# Patient Record
Sex: FEMALE | Race: WHITE | NOT HISPANIC OR LATINO
[De-identification: names, ages, dates, MRNs, and addresses within clinical notes are randomized per-mention and may not be internally consistent; named-entity substitution may affect disease eponyms.]

---

## 2018-05-14 ENCOUNTER — TRANSCRIPTION ENCOUNTER (OUTPATIENT)
Age: 43
End: 2018-05-14

## 2018-09-24 ENCOUNTER — EMERGENCY (EMERGENCY)
Facility: HOSPITAL | Age: 43
LOS: 1 days | Discharge: HOME | End: 2018-09-24
Attending: EMERGENCY MEDICINE
Payer: COMMERCIAL

## 2018-09-24 VITALS — HEIGHT: 63 IN | WEIGHT: 190.04 LBS

## 2018-09-24 DIAGNOSIS — D64.9 ANEMIA, UNSPECIFIED: ICD-10-CM

## 2018-09-24 DIAGNOSIS — N93.9 ABNORMAL UTERINE AND VAGINAL BLEEDING, UNSPECIFIED: ICD-10-CM

## 2018-09-24 LAB
ABO RH CONFIRMATION: SIGNIFICANT CHANGE UP
ALBUMIN SERPL ELPH-MCNC: 3.9 G/DL — SIGNIFICANT CHANGE UP (ref 3.3–5.2)
ALP SERPL-CCNC: 63 U/L — SIGNIFICANT CHANGE UP (ref 40–120)
ALT FLD-CCNC: 10 U/L — SIGNIFICANT CHANGE UP
ANION GAP SERPL CALC-SCNC: 9 MMOL/L — SIGNIFICANT CHANGE UP (ref 5–17)
ANISOCYTOSIS BLD QL: SLIGHT — SIGNIFICANT CHANGE UP
APTT BLD: 26.5 SEC — LOW (ref 27.5–37.4)
AST SERPL-CCNC: 20 U/L — SIGNIFICANT CHANGE UP
BASOPHILS # BLD AUTO: 0 K/UL — SIGNIFICANT CHANGE UP (ref 0–0.2)
BASOPHILS NFR BLD AUTO: 0.6 % — SIGNIFICANT CHANGE UP (ref 0–2)
BILIRUB SERPL-MCNC: 0.3 MG/DL — LOW (ref 0.4–2)
BLD GP AB SCN SERPL QL: SIGNIFICANT CHANGE UP
BUN SERPL-MCNC: 11 MG/DL — SIGNIFICANT CHANGE UP (ref 8–20)
CALCIUM SERPL-MCNC: 8.2 MG/DL — LOW (ref 8.6–10.2)
CHLORIDE SERPL-SCNC: 106 MMOL/L — SIGNIFICANT CHANGE UP (ref 98–107)
CO2 SERPL-SCNC: 26 MMOL/L — SIGNIFICANT CHANGE UP (ref 22–29)
CREAT SERPL-MCNC: 0.7 MG/DL — SIGNIFICANT CHANGE UP (ref 0.5–1.3)
EOSINOPHIL # BLD AUTO: 0.4 K/UL — SIGNIFICANT CHANGE UP (ref 0–0.5)
EOSINOPHIL NFR BLD AUTO: 5.2 % — SIGNIFICANT CHANGE UP (ref 0–6)
GLUCOSE SERPL-MCNC: 92 MG/DL — SIGNIFICANT CHANGE UP (ref 70–115)
HCG SERPL-ACNC: <5 MIU/ML — SIGNIFICANT CHANGE UP
HCT VFR BLD CALC: 26.6 % — LOW (ref 37–47)
HGB BLD-MCNC: 7.6 G/DL — LOW (ref 12–16)
HYPOCHROMIA BLD QL: SLIGHT — SIGNIFICANT CHANGE UP
INR BLD: 0.96 RATIO — SIGNIFICANT CHANGE UP (ref 0.88–1.16)
LYMPHOCYTES # BLD AUTO: 1.7 K/UL — SIGNIFICANT CHANGE UP (ref 1–4.8)
LYMPHOCYTES # BLD AUTO: 21.3 % — SIGNIFICANT CHANGE UP (ref 20–55)
MCHC RBC-ENTMCNC: 22.7 PG — LOW (ref 27–31)
MCHC RBC-ENTMCNC: 28.6 G/DL — LOW (ref 32–36)
MCV RBC AUTO: 79.4 FL — LOW (ref 81–99)
MONOCYTES # BLD AUTO: 0.6 K/UL — SIGNIFICANT CHANGE UP (ref 0–0.8)
MONOCYTES NFR BLD AUTO: 7.3 % — SIGNIFICANT CHANGE UP (ref 3–10)
NEUTROPHILS # BLD AUTO: 5.1 K/UL — SIGNIFICANT CHANGE UP (ref 1.8–8)
NEUTROPHILS NFR BLD AUTO: 65.5 % — SIGNIFICANT CHANGE UP (ref 37–73)
PLAT MORPH BLD: NORMAL — SIGNIFICANT CHANGE UP
PLATELET # BLD AUTO: 379 K/UL — SIGNIFICANT CHANGE UP (ref 150–400)
POTASSIUM SERPL-MCNC: 3.6 MMOL/L — SIGNIFICANT CHANGE UP (ref 3.5–5.3)
POTASSIUM SERPL-SCNC: 3.6 MMOL/L — SIGNIFICANT CHANGE UP (ref 3.5–5.3)
PROT SERPL-MCNC: 6.6 G/DL — SIGNIFICANT CHANGE UP (ref 6.6–8.7)
PROTHROM AB SERPL-ACNC: 10.5 SEC — SIGNIFICANT CHANGE UP (ref 9.8–12.7)
RBC # BLD: 3.35 M/UL — LOW (ref 4.4–5.2)
RBC # FLD: 15.5 % — SIGNIFICANT CHANGE UP (ref 11–15.6)
RBC BLD AUTO: ABNORMAL
SODIUM SERPL-SCNC: 141 MMOL/L — SIGNIFICANT CHANGE UP (ref 135–145)
TYPE + AB SCN PNL BLD: SIGNIFICANT CHANGE UP
WBC # BLD: 7.8 K/UL — SIGNIFICANT CHANGE UP (ref 4.8–10.8)
WBC # FLD AUTO: 7.8 K/UL — SIGNIFICANT CHANGE UP (ref 4.8–10.8)

## 2018-09-24 PROCEDURE — 76856 US EXAM PELVIC COMPLETE: CPT | Mod: 26

## 2018-09-24 PROCEDURE — 76856 US EXAM PELVIC COMPLETE: CPT

## 2018-09-24 PROCEDURE — 36430 TRANSFUSION BLD/BLD COMPNT: CPT

## 2018-09-24 PROCEDURE — 86900 BLOOD TYPING SEROLOGIC ABO: CPT

## 2018-09-24 PROCEDURE — 86850 RBC ANTIBODY SCREEN: CPT

## 2018-09-24 PROCEDURE — 85610 PROTHROMBIN TIME: CPT

## 2018-09-24 PROCEDURE — 76830 TRANSVAGINAL US NON-OB: CPT | Mod: 26

## 2018-09-24 PROCEDURE — 84702 CHORIONIC GONADOTROPIN TEST: CPT

## 2018-09-24 PROCEDURE — 76830 TRANSVAGINAL US NON-OB: CPT

## 2018-09-24 PROCEDURE — 36415 COLL VENOUS BLD VENIPUNCTURE: CPT

## 2018-09-24 PROCEDURE — 80053 COMPREHEN METABOLIC PANEL: CPT

## 2018-09-24 PROCEDURE — 85027 COMPLETE CBC AUTOMATED: CPT

## 2018-09-24 PROCEDURE — 86901 BLOOD TYPING SEROLOGIC RH(D): CPT

## 2018-09-24 PROCEDURE — 99284 EMERGENCY DEPT VISIT MOD MDM: CPT

## 2018-09-24 PROCEDURE — 85730 THROMBOPLASTIN TIME PARTIAL: CPT

## 2018-09-24 PROCEDURE — 86923 COMPATIBILITY TEST ELECTRIC: CPT

## 2018-09-24 PROCEDURE — P9016: CPT

## 2018-09-24 PROCEDURE — 99283 EMERGENCY DEPT VISIT LOW MDM: CPT | Mod: GC

## 2018-09-24 PROCEDURE — 99285 EMERGENCY DEPT VISIT HI MDM: CPT | Mod: 25

## 2018-09-24 RX ORDER — NORETHINDRONE AND ETHINYL ESTRADIOL 0.4-0.035
1 KIT ORAL
Qty: 1 | Refills: 2 | OUTPATIENT
Start: 2018-09-24 | End: 2018-12-16

## 2018-09-24 RX ORDER — FERROUS SULFATE 325(65) MG
1 TABLET ORAL
Qty: 30 | Refills: 1 | OUTPATIENT
Start: 2018-09-24 | End: 2018-11-22

## 2018-09-24 NOTE — ED ADULT NURSE NOTE - NSIMPLEMENTINTERV_GEN_ALL_ED
Implemented All Universal Safety Interventions:  [place] to call system. Call bell, personal items and telephone within reach. Instruct patient to call for assistance. Room bathroom lighting operational. Non-slip footwear when patient is off stretcher. Physically safe environment: no spills, clutter or unnecessary equipment. Stretcher in lowest position, wheels locked, appropriate side rails in place.

## 2018-09-24 NOTE — ED ADULT NURSE NOTE - OBJECTIVE STATEMENT
Assumed care at 1500.  a+OX4, reports abdominal cramping and vag bleeding, hx of cysts.  Denies any other complaints at this time

## 2018-09-24 NOTE — CONSULT NOTE ADULT - SUBJECTIVE AND OBJECTIVE BOX
42 yo P2   with bleeding for 3.5 weeks. She reports that during this time her bleeding has gotten heavier and lighter and she has had episode of passing clots with using 1 tampon/hour at times over the last few weeks. She states that over the last week she has had a few episodes of dizziness and feeling SOB and tired when climbing stairs. Feeling "like she has bricks on her feet. " Before this episode she reports having regular periods that come very 28 days and last 3-4 days. She does note that her periods have gotten heavier over the last few years.     ROS:  Gen: no fatigue  Resp: no SOB when walking up stairs   : no dysuria, increased frequency, urge  Gyn: + vaginal bleeding  ID: no fevers, chills  Int: no rash    PMH: Thyroid cancer twice - now hypothyroids, had elevated TSH in last few months   PAST MEDICAL & SURGICAL HISTORY:      ObHx:  x2  GynHx:   regular 28 day periods lasting 3-4 days    Menarchy:           Period:   last Pap: over 1 year ago, no h/o abnormal Paps   + history of cysts; denies h/o fibroids, STIs   Currently sexually active, not on any birth control       Allergies    No Known Allergies    Intolerances      Meds:  Home Medications:    Health Maintenance:       Height (cm): 160.02 (18 @ 14:25)  Weight (kg): 86.2 (18 @ 14:25)  BMI (kg/m2): 33.7 (18 @ 14:25)  BSA (m2): 1.89 (18 @ 14:25)  T(C): 36.7 (18 @ 14:34), Max: 36.7 (18 @ 14:34)  HR: 90 (18 @ :34) (90 - 90)  BP: 113/72 (18 @ 14:34) (113/72 - 113/72)  RR: 16 (18 @ 14:34) (16 - 16)  SpO2: 98% (18 @ 14:34) (98% - 98%)    Physical Exam:  Gen: alert oriented no acute distress, no apparent SOB with talking and walking  Abd: soft non-tender, no surgical incisions  Gyn:    external genitalia normal in appearance no lesions, blood on external genitalia     on speculum exam no abnormal lesions visualized, no abnormal discharge, small amount of dark red blood in the vaginal vault     on bimanual exam small anteverted uterus, right adnexal mass felt on bimanual.  no CMT.  os closed.            141  |  106  |  11.0  ----------------------------<  92  3.6   |  26.0  |  0.70    Ca    8.2<L>      24 Sep 2018 14:57    TPro  6.6  /  Alb  3.9  /  TBili  0.3<L>  /  DBili  x   /  AST  20  /  ALT  10  /  AlkPhos  63      LIVER FUNCTIONS - ( 24 Sep 2018 14:57 )  Alb: 3.9 g/dL / Pro: 6.6 g/dL / ALK PHOS: 63 U/L / ALT: 10 U/L / AST: 20 U/L / GGT: x             HCG Quantitative, Serum: <5 mIU/mL (18 @ 14:57)    < from: US Pelvis Complete (18 @ 17:32) >  EXAM:  US PELVIC COMPLETE                         EXAM:  US TRANSVAGINAL                          PROCEDURE DATE:  2018          INTERPRETATION:  Ultrasound of the female pelvis         CLINICAL INFORMATION:   Dysfunctional vaginal  Pain.    TECHNIQUE:  Transabdominal and endovaginal sonographic imaging was   performed to better visualize the adnexa.    FINDINGS:  No prior similar studies are available for review.    The uterus is anteverted.  It measures 10.0 x 5.0 x 5.2 cm. ( length x AP   x transverse).  Its contours are smooth.  The myometrium demonstrates no   focal lesion.  The endometrium demonstrates a thickness of 1.9 cm.    No endometrial fluid or mass seen.  Multiple subcentimeter nabothian cysts seen within the endocervical   myometrium measuring up to  0.33 cm.     The right ovary measures 7.1 x 4.9 x 6.6 cm.  RIGHT ovarian cyst   measures 6.0 x 3.8 x 5.1.      The left ovary measures 1.0 x 2.3 x 2.9 cm.           No adnexal suspicious mass, calcification or fat is found.         Color and pulsed doppler interrogation of both ovaries demonstrate normal   arterial and venous vascular waveforms.     No free fluid is found in the pelvis.    The bladder appears unremarkable.      IMPRESSION:    RIGHT ovarian cyst measures 6 cm in greatest diameter.   Follow-up sonogram within one to two menstrual cycles recommended to   confirm resolution.  Endometrium measures 1.9 cm without endometrial fluid or mass.          < end of copied text >

## 2018-09-24 NOTE — CONSULT NOTE ADULT - ASSESSMENT
42 yo P2 here for vaginal bleeding for three weeks with symptomatic anemia. First episode of abnormal uterine bleeding per pt's history. She does have some symptomatology of anemia, without any prior history of anemia. No current heavy bleeding on physical exam. She did not appear symptomatic from walking throughout the ED during the visit. With prior history of symptomatology and Hct of 26, will plan on transfusing 1 unit prbc and discharging outpatient with OCP and iron.

## 2018-09-24 NOTE — CONSULT NOTE ADULT - ATTENDING COMMENTS
patient seen and examined with PGY2 - patient stable, no active bleeding   symptomatic anemia - blood transfusion offered, pt to decide  reviewed importance of establishing GYN care for laboratory and possible pathologic evaluation

## 2018-09-24 NOTE — ED PROVIDER NOTE - OBJECTIVE STATEMENT
44 y/o F with PMHx of ovarian cysts presents to ED c/o heavy vaginal bleeding onset 3.5 weeks ago since her last menstrual period, worsening over the past week. States she recently relocated back to [place] and has not re-established PCP or OBGYN care. She is currently going through 1-2 super sized tampons per hour and is passing large clots. Also has some pressure in her abdomen and states "it is mostly over my L ovary". Denies fevers, chills, abdominal pain, nausea, vomiting, dizziness, lightheadedness, or syncope. Has never had fibroids before. No further acute complaints at this time.

## 2018-09-24 NOTE — ED PROVIDER NOTE - MEDICAL DECISION MAKING DETAILS
Pt with symptomatic anemia secondary to DUB. OBGYN has seen, f/u established and given OCPs. Pt well appearing and stable for dc

## 2018-09-25 VITALS
HEART RATE: 78 BPM | SYSTOLIC BLOOD PRESSURE: 108 MMHG | DIASTOLIC BLOOD PRESSURE: 69 MMHG | OXYGEN SATURATION: 100 % | TEMPERATURE: 98 F | RESPIRATION RATE: 16 BRPM

## 2018-09-25 NOTE — ED ADULT NURSE REASSESSMENT NOTE - NS ED NURSE REASSESS COMMENT FT1
received pt at this time, pt a&ox3 denies any pain/discomfort. pt still reports heavy vaginal bleeding. consult obtained for blood transfusion. blood bank called, need confirmiatory. will obtain and send to lab. pt updated on plan of care, call bell in reach
s/p 1u prbc, pt denies any s/s adverse rx. pt states she feels much better. vaginal bleeding and cramping persist
Pt tolerating PRBC infusion well, offers no complaints, denies pain, no s/s of transfusion reaction, awaiting completion of blood and D/C, will continue to monitor

## 2019-07-15 ENCOUNTER — EMERGENCY (EMERGENCY)
Facility: HOSPITAL | Age: 44
LOS: 1 days | Discharge: HOME | End: 2019-07-15
Attending: EMERGENCY MEDICINE | Admitting: EMERGENCY MEDICINE
Payer: MEDICAID

## 2019-07-15 VITALS
OXYGEN SATURATION: 100 % | RESPIRATION RATE: 18 BRPM | SYSTOLIC BLOOD PRESSURE: 112 MMHG | TEMPERATURE: 98 F | HEART RATE: 81 BPM | DIASTOLIC BLOOD PRESSURE: 60 MMHG

## 2019-07-15 DIAGNOSIS — Z98.890 OTHER SPECIFIED POSTPROCEDURAL STATES: Chronic | ICD-10-CM

## 2019-07-15 DIAGNOSIS — Z90.3 ACQUIRED ABSENCE OF STOMACH [PART OF]: Chronic | ICD-10-CM

## 2019-07-15 LAB
ALBUMIN SERPL ELPH-MCNC: 4 G/DL — SIGNIFICANT CHANGE UP (ref 3.3–5)
ALP SERPL-CCNC: 56 U/L — SIGNIFICANT CHANGE UP (ref 40–120)
ALT FLD-CCNC: 6 U/L — SIGNIFICANT CHANGE UP (ref 4–33)
ANION GAP SERPL CALC-SCNC: 11 MMO/L — SIGNIFICANT CHANGE UP (ref 7–14)
APTT BLD: 26.4 SEC — LOW (ref 27.5–36.3)
AST SERPL-CCNC: 14 U/L — SIGNIFICANT CHANGE UP (ref 4–32)
BASOPHILS # BLD AUTO: 0.07 K/UL — SIGNIFICANT CHANGE UP (ref 0–0.2)
BASOPHILS NFR BLD AUTO: 1.2 % — SIGNIFICANT CHANGE UP (ref 0–2)
BILIRUB SERPL-MCNC: 0.4 MG/DL — SIGNIFICANT CHANGE UP (ref 0.2–1.2)
BLD GP AB SCN SERPL QL: NEGATIVE — SIGNIFICANT CHANGE UP
BUN SERPL-MCNC: 9 MG/DL — SIGNIFICANT CHANGE UP (ref 7–23)
CALCIUM SERPL-MCNC: 8.5 MG/DL — SIGNIFICANT CHANGE UP (ref 8.4–10.5)
CHLORIDE SERPL-SCNC: 105 MMOL/L — SIGNIFICANT CHANGE UP (ref 98–107)
CK SERPL-CCNC: 70 U/L — SIGNIFICANT CHANGE UP (ref 25–170)
CO2 SERPL-SCNC: 24 MMOL/L — SIGNIFICANT CHANGE UP (ref 22–31)
CREAT SERPL-MCNC: 0.72 MG/DL — SIGNIFICANT CHANGE UP (ref 0.5–1.3)
EOSINOPHIL # BLD AUTO: 0.28 K/UL — SIGNIFICANT CHANGE UP (ref 0–0.5)
EOSINOPHIL NFR BLD AUTO: 4.9 % — SIGNIFICANT CHANGE UP (ref 0–6)
FERRITIN SERPL-MCNC: 3.57 NG/ML — LOW (ref 15–150)
FOLATE SERPL-MCNC: 13.1 NG/ML — SIGNIFICANT CHANGE UP (ref 4.7–20)
GLUCOSE SERPL-MCNC: 95 MG/DL — SIGNIFICANT CHANGE UP (ref 70–99)
HAPTOGLOB SERPL-MCNC: 95 MG/DL — SIGNIFICANT CHANGE UP (ref 34–200)
HCT VFR BLD CALC: 26.2 % — LOW (ref 34.5–45)
HGB BLD-MCNC: 6.4 G/DL — CRITICAL LOW (ref 11.5–15.5)
IMM GRANULOCYTES NFR BLD AUTO: 0.2 % — SIGNIFICANT CHANGE UP (ref 0–1.5)
INR BLD: 0.93 — SIGNIFICANT CHANGE UP (ref 0.88–1.17)
IRON SATN MFR SERPL: 12 UG/DL — LOW (ref 30–160)
IRON SATN MFR SERPL: 436 UG/DL — SIGNIFICANT CHANGE UP (ref 140–530)
LDH SERPL L TO P-CCNC: 173 U/L — SIGNIFICANT CHANGE UP (ref 135–225)
LIDOCAIN IGE QN: 69 U/L — HIGH (ref 7–60)
LYMPHOCYTES # BLD AUTO: 1.46 K/UL — SIGNIFICANT CHANGE UP (ref 1–3.3)
LYMPHOCYTES # BLD AUTO: 25.4 % — SIGNIFICANT CHANGE UP (ref 13–44)
MCHC RBC-ENTMCNC: 16.6 PG — LOW (ref 27–34)
MCHC RBC-ENTMCNC: 24.4 % — LOW (ref 32–36)
MCV RBC AUTO: 67.9 FL — LOW (ref 80–100)
MONOCYTES # BLD AUTO: 0.46 K/UL — SIGNIFICANT CHANGE UP (ref 0–0.9)
MONOCYTES NFR BLD AUTO: 8 % — SIGNIFICANT CHANGE UP (ref 2–14)
NEUTROPHILS # BLD AUTO: 3.47 K/UL — SIGNIFICANT CHANGE UP (ref 1.8–7.4)
NEUTROPHILS NFR BLD AUTO: 60.3 % — SIGNIFICANT CHANGE UP (ref 43–77)
NRBC # FLD: 0 K/UL — SIGNIFICANT CHANGE UP (ref 0–0)
OB PNL STL: NEGATIVE — SIGNIFICANT CHANGE UP
PLATELET # BLD AUTO: 372 K/UL — SIGNIFICANT CHANGE UP (ref 150–400)
PMV BLD: 8.9 FL — SIGNIFICANT CHANGE UP (ref 7–13)
POTASSIUM SERPL-MCNC: 3.7 MMOL/L — SIGNIFICANT CHANGE UP (ref 3.5–5.3)
POTASSIUM SERPL-SCNC: 3.7 MMOL/L — SIGNIFICANT CHANGE UP (ref 3.5–5.3)
PROT SERPL-MCNC: 6.7 G/DL — SIGNIFICANT CHANGE UP (ref 6–8.3)
PROTHROM AB SERPL-ACNC: 10.6 SEC — SIGNIFICANT CHANGE UP (ref 9.8–13.1)
RBC # BLD: 3.86 M/UL — SIGNIFICANT CHANGE UP (ref 3.8–5.2)
RBC # FLD: 18.5 % — HIGH (ref 10.3–14.5)
RH IG SCN BLD-IMP: POSITIVE — SIGNIFICANT CHANGE UP
SODIUM SERPL-SCNC: 140 MMOL/L — SIGNIFICANT CHANGE UP (ref 135–145)
TSH SERPL-MCNC: 9.17 UIU/ML — HIGH (ref 0.27–4.2)
UIBC SERPL-MCNC: 423.6 UG/DL — HIGH (ref 110–370)
VIT B12 SERPL-MCNC: 272 PG/ML — SIGNIFICANT CHANGE UP (ref 200–900)
WBC # BLD: 5.75 K/UL — SIGNIFICANT CHANGE UP (ref 3.8–10.5)
WBC # FLD AUTO: 5.75 K/UL — SIGNIFICANT CHANGE UP (ref 3.8–10.5)

## 2019-07-15 PROCEDURE — 99218: CPT

## 2019-07-15 PROCEDURE — 93971 EXTREMITY STUDY: CPT | Mod: 26,LT

## 2019-07-15 RX ORDER — LEVOTHYROXINE SODIUM 125 MCG
50 TABLET ORAL DAILY
Refills: 0 | Status: DISCONTINUED | OUTPATIENT
Start: 2019-07-15 | End: 2019-07-19

## 2019-07-15 RX ORDER — ACETAMINOPHEN 500 MG
650 TABLET ORAL ONCE
Refills: 0 | Status: COMPLETED | OUTPATIENT
Start: 2019-07-15 | End: 2019-07-15

## 2019-07-15 RX ORDER — FAMOTIDINE 10 MG/ML
20 INJECTION INTRAVENOUS ONCE
Refills: 0 | Status: COMPLETED | OUTPATIENT
Start: 2019-07-15 | End: 2019-07-15

## 2019-07-15 RX ADMIN — Medication 650 MILLIGRAM(S): at 18:38

## 2019-07-15 RX ADMIN — FAMOTIDINE 20 MILLIGRAM(S): 10 INJECTION INTRAVENOUS at 18:39

## 2019-07-15 NOTE — ED PROVIDER NOTE - PHYSICAL EXAMINATION
GEN - NAD; well appearing; A+O x3   HEAD - NC/AT   EYES- PERRL, EOMI  ENT: Airway patent, mmm, Oral cavity and pharynx normal.   NECK: Neck supple  PULMONARY - CTA b/l, symmetric breath sounds.   CARDIAC -s1s2, RRR, no M,G,R  ABDOMEN - +BS, ND, NT, soft, no guarding, no rebound, no masses   BACK - no CVA tenderness, Normal  spine   EXTREMITIES - LLE with no tenderness, edema, warmth, redness-well perfused, nvi. remainder of ext exam nl-from, no edema.  SKIN - no rash or bruising   NEUROLOGIC - alert, speech clear, no focal deficits  PSYCH -nl mood/affect, nl insight.

## 2019-07-15 NOTE — ED PROVIDER NOTE - NS ED ROS FT
ROS:  GENERAL: No fever, no chills, +fatigue  EYES: no change in vision  HEENT: no trouble swallowing, no trouble speaking  CARDIAC: no chest pain  PULMONARY: no cough, no shortness of breath  GI: +intermittent epigastric pain  : No dysuria, no frequency, no change in appearance, or odor of urine  SKIN: no rashes  NEURO: no headache, no weakness  MSK: No joint pain  HEME: + anemia

## 2019-07-15 NOTE — ED CDU PROVIDER INITIAL DAY NOTE - OBJECTIVE STATEMENT
ED provider note initial HPI: "43 y/o f presents with fatigue/generalized weakness, worsening over the last few weeks. Went to doctor few days ago, had blood drawn, and called saturday and told to come to ed for transfusion for hb of 5. Patient has h/o anemia requiring transfusion, last was in january, 2/2 heavy periods. Recently has not been having periods. Also notes crampy left leg pain for months, now becoming more frequent, constant, no alleviating or exacerbating factors. Patient also notes intermittent epigastric pain after eating for months, nonradiating, none at this time. No fevers, chills, ha, cp, sob, vomiting, diarrhea, dysuria, hematuria, brbpr, melena. Patient was previously off her levothyroxine-just had 50mcg re-prescribed for her."    CDU note: 44 year old female with hx of gastrectomy and thyroidectomy presents to the ED complaining of feeling increasingly tired. Pt states she went to her PMD last Thursday, had routine blood work, and was told to have low hemoglobin and low iron. Pt admits to a hx of anemia which required transfusion in the past; anemia thought to be from heavy vaginal bleeding; denies active bleeding at this time. Of note pt also complains of epigastric discomfort for a bout a year  but asymptomatic at present, and left lower extremity pain for many months which she describes as crampy feeling without . Pt denies chest pain, dyspnea, dizziness, nausea, vomiting, melena, hematochezia, fever or chills. Pt denies any other complaints. ED provider note initial HPI: "43 y/o f presents with fatigue/generalized weakness, worsening over the last few weeks. Went to doctor few days ago, had blood drawn, and called saturday and told to come to ed for transfusion for hb of 5. Patient has h/o anemia requiring transfusion, last was in january, 2/2 heavy periods. Recently has not been having periods. Also notes crampy left leg pain for months, now becoming more frequent, constant, no alleviating or exacerbating factors. Patient also notes intermittent epigastric pain after eating for months, nonradiating, none at this time. No fevers, chills, ha, cp, sob, vomiting, diarrhea, dysuria, hematuria, brbpr, melena. Patient was previously off her levothyroxine-just had 50mcg re-prescribed for her."    CDU note: 44 year old female with hx of gastrectomy and thyroidectomy presents to the ED complaining of feeling increasingly tired. Pt states she went to her PMD last Thursday, had routine blood work, and was told to have low hemoglobin and low iron. Pt admits to a hx of anemia which required transfusion in the past; anemia thought to be from heavy vaginal bleeding; denies active bleeding at this time. Of note pt also complains of epigastric discomfort for a bout a year  but asymptomatic at present, and left lower extremity pain for many months which she describes as crampy feeling without . Pt denies chest pain, dyspnea, dizziness, nausea, vomiting, melena, hematochezia, hematuria, vaginal bleeding, fever or chills. Pt denies any other complaints.

## 2019-07-15 NOTE — ED CDU PROVIDER INITIAL DAY NOTE - ATTENDING CONTRIBUTION TO CARE
43 y/o f presents for low h/h found on bloodwork last week, h/o anemia requiring transfusion, previously from heavy vaginal bleeding. Also with intermittent epigastric pain for months, h/o sleeve gastrectomy, and lle cramping. Patient is very well appearing and exam is grossly benign-abd nontender, lle normal. Ed w/u was significant for severe anemia, neg fobt, neg dvt study, neg ck. Leg pain likely msk, plan for cdu-transfusion of prbc and reass labwork. patient also with known hypothyroidism just restarted on meds.

## 2019-07-15 NOTE — ED PROVIDER NOTE - PROGRESS NOTE DETAILS
guiac negative, all results d/w patient-transfusions ordered, to go to cdu for prbc-consented, agreeable to plan.

## 2019-07-15 NOTE — ED PROVIDER NOTE - CLINICAL SUMMARY MEDICAL DECISION MAKING FREE TEXT BOX
45 y/o f presents for low h/h found on bloodwork last week, h/o anemia requiring transfusion, previously from heavy vaginal bleeding. Also with intermittent epigastric pain for months, h/o sleeve gastrectomy, and lle cramping. Patient is very well appearing and exam is grossly benign-abd nontender, lle normal. Diff dx includes gi bleed 2/2 gastritis v. ulcer, v. anemia from previous vag bleeding (though patient states she hasn't bled heavily for months), v. pancreatitis. LLE pain likely msk but will do us to eval for dvt, occult blood to eval for gi bl.

## 2019-07-15 NOTE — ED PROVIDER NOTE - OBJECTIVE STATEMENT
45 y/o f presents with fatigue/generalized weakness, worsening over the last few weeks. Went to doctor few days ago, had blood drawn, and called saturday and told to come to ed for transfusion for hb of 5. Patient has h/o anemia requiring transfusion, last was in january, 2/2 heavy periods. Recently has not been having periods. Also notes crampy left leg pain for months, now becoming more frequent, constant, no alleviating or exacerbating factors. Patient also notes intermittent epigastric pain after eating for months, nonradiating, none at this time. No fevers, chills, ha, cp, sob, vomiting, diarrhea, dysuria, hematuria, brbpr, melena. 43 y/o f presents with fatigue/generalized weakness, worsening over the last few weeks. Went to doctor few days ago, had blood drawn, and called saturday and told to come to ed for transfusion for hb of 5. Patient has h/o anemia requiring transfusion, last was in january, 2/2 heavy periods. Recently has not been having periods. Also notes crampy left leg pain for months, now becoming more frequent, constant, no alleviating or exacerbating factors. Patient also notes intermittent epigastric pain after eating for months, nonradiating, none at this time. No fevers, chills, ha, cp, sob, vomiting, diarrhea, dysuria, hematuria, brbpr, melena. Patient was previously off her levothyroxine-just had 50mcg re-prescribed for her.

## 2019-07-15 NOTE — ED ADULT NURSE NOTE - OBJECTIVE STATEMENT
p/t is a 44y old female complaining of abd pain and low h & h, sent by PMD for possible blood transfusion, labs drawn and sent, will continue to monitor

## 2019-07-15 NOTE — ED ADULT NURSE NOTE - CHIEF COMPLAINT QUOTE
pt sent to ED for abnormal blood work.  pt went to her PMD for sick visit, dizziness, weakness and was found to have low H&H

## 2019-07-15 NOTE — ED CDU PROVIDER INITIAL DAY NOTE - DETAILS
44 year old female presenting with feelings of tiredness; found to have severe anemia by PMD; has hx of anemia requiring transfusion in past; no active source of bleeding at this time. Also complains of chronic left leg pain and epigastric discomfort. Hemodynamically stable, sleeping comfortably, A&Ox3, heart and lung exam normal, abdomen soft nontender, left lower extremity without swelling, erythema or tenderness, motor strength 5/5, sensation intact to light touch, DP/PT pulses 2+.   In ED, labs revealed H/H 6.4/26.2, Guaiac negative, left LE duplex scan negative for DVT, CK normal.  Pt is in CDU for severe anemia requiring transfusion. Plan for 2PRBCs, monitor, serial CBCs.

## 2019-07-16 VITALS
SYSTOLIC BLOOD PRESSURE: 115 MMHG | HEART RATE: 75 BPM | DIASTOLIC BLOOD PRESSURE: 68 MMHG | OXYGEN SATURATION: 99 % | RESPIRATION RATE: 18 BRPM | TEMPERATURE: 99 F

## 2019-07-16 LAB
BASOPHILS # BLD AUTO: 0.07 K/UL — SIGNIFICANT CHANGE UP (ref 0–0.2)
BASOPHILS NFR BLD AUTO: 1.2 % — SIGNIFICANT CHANGE UP (ref 0–2)
EOSINOPHIL # BLD AUTO: 0.25 K/UL — SIGNIFICANT CHANGE UP (ref 0–0.5)
EOSINOPHIL NFR BLD AUTO: 4.4 % — SIGNIFICANT CHANGE UP (ref 0–6)
HCT VFR BLD CALC: 30.2 % — LOW (ref 34.5–45)
HGB BLD-MCNC: 8 G/DL — LOW (ref 11.5–15.5)
IMM GRANULOCYTES NFR BLD AUTO: 0.4 % — SIGNIFICANT CHANGE UP (ref 0–1.5)
LYMPHOCYTES # BLD AUTO: 1.12 K/UL — SIGNIFICANT CHANGE UP (ref 1–3.3)
LYMPHOCYTES # BLD AUTO: 19.9 % — SIGNIFICANT CHANGE UP (ref 13–44)
MCHC RBC-ENTMCNC: 18.6 PG — LOW (ref 27–34)
MCHC RBC-ENTMCNC: 26.5 % — LOW (ref 32–36)
MCV RBC AUTO: 70.2 FL — LOW (ref 80–100)
MONOCYTES # BLD AUTO: 0.46 K/UL — SIGNIFICANT CHANGE UP (ref 0–0.9)
MONOCYTES NFR BLD AUTO: 8.2 % — SIGNIFICANT CHANGE UP (ref 2–14)
NEUTROPHILS # BLD AUTO: 3.7 K/UL — SIGNIFICANT CHANGE UP (ref 1.8–7.4)
NEUTROPHILS NFR BLD AUTO: 65.9 % — SIGNIFICANT CHANGE UP (ref 43–77)
NRBC # FLD: 0 K/UL — SIGNIFICANT CHANGE UP (ref 0–0)
PLATELET # BLD AUTO: 253 K/UL — SIGNIFICANT CHANGE UP (ref 150–400)
PMV BLD: 8.3 FL — SIGNIFICANT CHANGE UP (ref 7–13)
RBC # BLD: 4.3 M/UL — SIGNIFICANT CHANGE UP (ref 3.8–5.2)
RBC # FLD: 19.7 % — HIGH (ref 10.3–14.5)
T3 SERPL-MCNC: 79.8 NG/DL — LOW (ref 80–200)
T3FREE SERPL-MCNC: 1.94 PG/ML — SIGNIFICANT CHANGE UP (ref 1.8–4.6)
T4 AB SER-ACNC: 4.65 UG/DL — LOW (ref 5.1–13)
WBC # BLD: 5.62 K/UL — SIGNIFICANT CHANGE UP (ref 3.8–10.5)
WBC # FLD AUTO: 5.62 K/UL — SIGNIFICANT CHANGE UP (ref 3.8–10.5)

## 2019-07-16 PROCEDURE — 99217: CPT

## 2019-07-16 RX ADMIN — Medication 50 MICROGRAM(S): at 05:48

## 2019-07-16 NOTE — ED CDU PROVIDER SUBSEQUENT DAY NOTE - PROGRESS NOTE DETAILS
Pt signed out that she was pending repeat labs this morning after transfusion. Hemoglobin improved to 8. No active bleeding or current symptoms. Has been ambulatory around the unit without issue. Pt states when she saw her doctor and found she was anemic, also told that her thyroid levels were abnormal and was restarted on her synthroid. Pt states has hx of papillary thyroid cancer s/p thyroidectomy, was off synthroid for some time but now back on. All copies of reports given to patient and to ensure follow up with PMD. Pt also advised to begin Iron supplementation. Return precautions given to patient. Ready for dc.

## 2019-07-16 NOTE — ED CDU PROVIDER SUBSEQUENT DAY NOTE - HISTORY
44 year old female presenting with feelings of tiredness; found to have severe anemia by PMD; has hx of anemia requiring transfusion in past; no active source of bleeding at this time. In ED, labs revealed H/H 6.4/26.2, Guaiac negative, left LE duplex scan negative for DVT, CK normal. Pt is in CDU for severe anemia requiring transfusion. No acute events overnight. Plan for 2PRBCs (2nd unit is now running), monitor, serial CBCs.

## 2019-07-16 NOTE — ED CDU PROVIDER DISPOSITION NOTE - CLINICAL COURSE
44F with pmh of anemia (requiring transfusions, 2/2 vaginal bleeding), hypothyroidism p/w generalized fatigue and low Hgb in PMDs office. Pt denies current vaginal bleeding or melena. Not on blood thinners. Vitals stable. Hg 6.4 in the ED.  occult blood negative. Pt also had LLE pain and had a negative LLE Doppler. Pt was placed in CDU for transfusion and repeat CBC. TSH elevated, thyroid panel sent. Pt reports that was restarted on her synthroid last week and will follow up with her endocrinologist. Repeat CBC with Hgb 8.0. Pt feels well and will follow up with her PMD. Pt recommended starting iron supplement.

## 2019-07-16 NOTE — ED CDU PROVIDER DISPOSITION NOTE - NSFOLLOWUPINSTRUCTIONS_ED_ALL_ED_FT
Begin taking over the counter Iron supplementation ( SlowFE) as your Iron level was found to be low.   Follow up with your primary care physician in 48-72 hours- bring copies of your results. Make sure you show Thyroid function studies to ensure proper management of medication regimen.   Return to the Emergency Department for fevers, shortness of breath , chest pain, palpitations, worsening or persistent symptoms OR ANY NEW OR CONCERNING SYMPTOMS.

## 2019-07-16 NOTE — ED CDU PROVIDER DISPOSITION NOTE - ATTENDING CONTRIBUTION TO CARE
Dr. Grimaldo: I performed a face to face bedside interview with patient regarding history of present illness, review of symptoms and past medical history. I completed an independent physical exam.  I have discussed patient's plan of care with PA.   I agree with note as stated above, having amended the EMR as needed to reflect my findings.   This includes HISTORY OF PRESENT ILLNESS, HIV, PAST MEDICAL/SURGICAL/FAMILY/SOCIAL HISTORY, ALLERGIES AND HOME MEDICATIONS, REVIEW OF SYSTEMS, PHYSICAL EXAM, and any PROGRESS NOTES during the time I functioned as the attending physician for this patient.

## 2019-07-18 NOTE — ED POST DISCHARGE NOTE - RESULT SUMMARY
43 y/o female seen in ED for transfusion.  Pt is s/p thyroidectomy and started taking Synthroid again this week.  Follow up pending Free Triiodothyronine result.  TSH 9.17, T3 Total 79.8 and T4 4.65.  Pt instructed to follow up with PMD for thyroid results.  Contact pt with pending lab result - Free Triiodothyronine result 1.94

## 2020-11-17 ENCOUNTER — TRANSCRIPTION ENCOUNTER (OUTPATIENT)
Age: 45
End: 2020-11-17

## 2021-03-01 NOTE — ED ADULT NURSE NOTE - TOBACCO SCREENING (FROM THE ASSIST)
[facility] ENCOUNTER  PODIATRY FOLLOW UP OFFICE VISIT    CHIEF COMPLAINT:   Chief Complaint   Patient presents with   • Foot     2 week f/u, Recheck right foot. [place] right plantars wart. Patient is still having pain in the heel area and doesn't radiate elsewhere. Significantly the worst pain in the morning, overall has gotten better though.       SUBJECTIVE:  HISTORY OF PRESENT ILLNESS:  Kassi Meyers is a 41 year old female seen today for recheck right foot pain and right wart.  Patient states she is doing better.  Would like to get back to exercise.    REVIEW OF SYSTEMS:  Constitutional: Negative for fever and chills.  Skin: Negative for rash.  Neurologic: Feet were negative for changes in sensory or motor function.  Endocrine: Negative for heat or cold intolerance.  Hematologic: Patient on anticoagulants. []  YES    [x]  NO  Extremities:  Edema: none.  Tenderness: right foot only.  All other systems are reviewed and negative except as noted in history of present illness.    HISTORIES:  I have reviewed the past medical history, family history, social history, medications and allergies listed in the medical record as well as the nursing notes for this encounter.    OBJECTIVE  PHYSICAL EXAM:  Visit Vitals  /68   Pulse 70   Ht 5' 10\" (1.778 m) Comment: stated   Wt 107.2 kg Comment: stated   LMP 08/07/2020 (Exact Date)   BMI 33.91 kg/m²     General:  No apparent distress. Alert and oriented.  Neurological:  Protective sensation: Intact to light touch bilaterally.  Normal tone bilateral lower extremities.  Vascular:  Pulses: Right  Dorsalis Pedis 2/4 and Posterior Tibial 2/4.  Left  Dorsalis Pedis 2/4 and Posterior Tibial 2/4  Capillary refill < three seconds bilaterally in digits 1-5  No edema of bilateral lower extremities/feet.  Hair growth present at the level of the digits     Dermatologic:  Skin: Clean, dry and intact bilaterally. Skin texture to the bilateral lower extremities is within normal  limits. No ecchymosis or erythema to either foot or ankle. Nails all toes, both feet are normal  Lesions present x2 on the lateral calcaneus right show interruption of skin lines no remaining thrombosed capillaries or pinpoint bleeding upon debridement  Musculoskeletal:  Gait analysis: [x]  Normal    [] Abnormal   No pain on palpation lateral calcaneus some discomfort per patient in the morning inferior calcaneus    RESULT REVIEW:  MRI showed abductor digiti minimi muscle strain    ASSESSMENT:  1. Muscle strain of right foot, subsequent encounter    2. Plantar wart        PLAN:  No orders of the defined types were placed in this encounter.      1. Patient was examined and evaluated.  The etiology of the above condition was discussed in detail with patient. Current condition was discussed at length and the natural progressive course if left untreated. Viable alternative modes of treatment were discussed.    I discussed her condition and for the right foot pain recommend stretching before she gets out of bed.  She may return to her lip topical machine and stretching exercises.  For the wart we did 1 final debridement and chemo cautery to the verruca.  Occlusive dressing was applied.  Monitor for any recurrence of the verruca or heel pain    Return if symptoms worsen or fail to improve.    Instructions provided as documented in the after visit summary.    The patient indicated understanding of the diagnosis and agreed with the plan of care.   Statement Selected

## 2022-02-02 PROBLEM — Z00.00 ENCOUNTER FOR PREVENTIVE HEALTH EXAMINATION: Status: ACTIVE | Noted: 2022-02-02

## 2022-02-11 NOTE — ED CDU PROVIDER SUBSEQUENT DAY NOTE - ATTENDING CONTRIBUTION TO CARE
GENERAL SURGERY CONSULT NOTE    Patient: WILLIAN MARY , 53y ([birth year])Female   MRN: [id number]  Location: [place] [address] ([facility]) 009 A  Visit: 02-06-22 Inpatient  Date: 02-11-22 @ 03:07    HPI:  54 YO F w/ PMHx of COPD (on 3L home O2), CORNELIUS on CPAP, HFrEF (15-20% on TTE 11/2021), severe pulmonary HTN, CAD s/p PCI to RCA, HTN/HLD, CVA with residual LLE weakness, T2DM, recently quit smoking (1 month ago) presented for shortness of breath, being treated for CHF exacerbation. Vascular surgery was consulted duet to arterial duplex showing occlusion of the bilateral femoral arteries      PAST MEDICAL & SURGICAL HISTORY:  Hypertension    Hyperlipidemia    Anxiety and depression    COPD, severe    CHF (congestive heart failure)    Cerebrovascular accident (CVA)  Multiple    Type 2 diabetes mellitus    CKD (chronic kidney disease), stage II    No significant past surgical history        Home Medications:  magnesium oxide 400 mg oral tablet: 1 tab(s) orally 3 times a day (with meals) (14 Jan 2022 12:37)  pantoprazole 40 mg oral delayed release tablet: 1 tab(s) orally once a day (before a meal) (16 Nov 2021 12:10)        VITALS:  T(F): 97.1 (02-11-22 @ 00:30), Max: 98.3 (02-10-22 @ 20:32)  HR: 76 (02-11-22 @ 00:30) (76 - 92)  BP: 106/72 (02-11-22 @ 00:30) (106/68 - 129/76)  RR: 18 (02-11-22 @ 00:30) (18 - 18)  SpO2: --    PHYSICAL EXAM:  General: NAD  Cardiac: RRR S1, S2,  Respiratory: CTAB  Abdomen: Soft, non-distended, non-tender,   Vascular: RLE dopplerable dp, no pt. LLE unable to doppler dp/pt. foot are warm, normal in color, no skin ulcerations, good capillary refill. good motor. decreased sensory on the left due since the stroke    MEDICATIONS  (STANDING):  aspirin enteric coated 81 milliGRAM(s) Oral daily  atorvastatin 80 milliGRAM(s) Oral at bedtime  budesonide 160 MICROgram(s)/formoterol 4.5 MICROgram(s) Inhaler 2 Puff(s) Inhalation two times a day  buMETAnide Infusion 1 mG/Hr (5 mL/Hr) IV Continuous <Continuous>  clopidogrel Tablet 75 milliGRAM(s) Oral daily  enoxaparin Injectable 40 milliGRAM(s) SubCutaneous daily  fenofibrate Tablet 145 milliGRAM(s) Oral daily  influenza   Vaccine 0.5 milliLiter(s) IntraMuscular once  insulin lispro (ADMELOG) corrective regimen sliding scale   SubCutaneous Before meals and at bedtime  magnesium oxide 400 milliGRAM(s) Oral three times a day with meals  metoprolol succinate ER 50 milliGRAM(s) Oral daily  pantoprazole    Tablet 40 milliGRAM(s) Oral before breakfast  QUEtiapine 25 milliGRAM(s) Oral at bedtime  sacubitril 49 mG/valsartan 51 mG 1 Tablet(s) Oral two times a day  spironolactone 25 milliGRAM(s) Oral daily    MEDICATIONS  (PRN):  ALBUTerol    90 MICROgram(s) HFA Inhaler 2 Puff(s) Inhalation every 6 hours PRN Bronchospasm  albuterol/ipratropium for Nebulization 3 milliLiter(s) Nebulizer every 6 hours PRN Shortness of Breath and/or Wheezing  loperamide 2 milliGRAM(s) Oral three times a day PRN Diarrhea      LAB/STUDIES:                        14.1   4.91  )-----------( 280      ( 10 Feb 2022 06:14 )             45.5     02-10    139  |  94<L>  |  17  ----------------------------<  157<H>  4.3   |  31  |  1.2    Ca    8.6      10 Feb 2022 16:50  Mg     1.4     02-10    TPro  6.7  /  Alb  3.5  /  TBili  1.7<H>  /  DBili  x   /  AST  23  /  ALT  11  /  AlkPhos  131<H>  02-10      LIVER FUNCTIONS - ( 10 Feb 2022 06:14 )  Alb: 3.5 g/dL / Pro: 6.7 g/dL / ALK PHOS: 131 U/L / ALT: 11 U/L / AST: 23 U/L / GGT: x             CARDIAC MARKERS ( 10 Feb 2022 06:14 )  x     / <0.01 ng/mL / x     / x     / 1.4 ng/mL      IMAGING:      ACCESS DEVICES:  [ ] Peripheral IV  [ ] Central Venous Line	[ ] R	[ ] L	[ ] IJ	[ ] Fem	[ ] SC	Placed:   [ ] Arterial Line		[ ] R	[ ] L	[ ] Fem	[ ] Rad	[ ] Ax	Placed:   [ ] PICC:					[ ] Mediport  [ ] Urinary Catheter, Date Placed:      Dr. Grimaldo: I performed a face to face bedside interview with patient regarding history of present illness, review of symptoms and past medical history. I completed an independent physical exam.  I have discussed patient's plan of care with PA.   I agree with note as stated above, having amended the EMR as needed to reflect my findings.   This includes HISTORY OF PRESENT ILLNESS, HIV, PAST MEDICAL/SURGICAL/FAMILY/SOCIAL HISTORY, ALLERGIES AND HOME MEDICATIONS, REVIEW OF SYSTEMS, PHYSICAL EXAM, and any PROGRESS NOTES during the time I functioned as the attending physician for this patient.    44F with pmh of anemia (requiring transfusions, 2/2 vaginal bleeding), hypothyroidism p/w generalized fatigue and low Hgb in PMDs office. Pt denies current vaginal bleeding or melena. Not on blood thinners. Vitals stable. Hg 6.4 in the ED.  occult blood negative. Pt also had LLE pain and had a negative LLE Doppler. Pt was placed in CDU for transfusion and repeat CBC. TSH elevated, thyroid panel sent. Pt reports that was restarted on her synthroid last week and will follow up with her endocrinologist. This morning feels improved pending repeat CBC.

## 2022-02-15 ENCOUNTER — APPOINTMENT (OUTPATIENT)
Dept: SURGICAL ONCOLOGY | Facility: CLINIC | Age: 47
End: 2022-02-15
Payer: MEDICAID

## 2022-02-15 VITALS
SYSTOLIC BLOOD PRESSURE: 127 MMHG | TEMPERATURE: 97.5 F | HEIGHT: 63 IN | BODY MASS INDEX: 31.89 KG/M2 | WEIGHT: 180 LBS | OXYGEN SATURATION: 99 % | DIASTOLIC BLOOD PRESSURE: 87 MMHG | HEART RATE: 79 BPM | RESPIRATION RATE: 16 BRPM

## 2022-02-15 DIAGNOSIS — Z78.9 OTHER SPECIFIED HEALTH STATUS: ICD-10-CM

## 2022-02-15 DIAGNOSIS — Z86.39 PERSONAL HISTORY OF OTHER ENDOCRINE, NUTRITIONAL AND METABOLIC DISEASE: ICD-10-CM

## 2022-02-15 PROCEDURE — 99202 OFFICE O/P NEW SF 15 MIN: CPT

## 2022-02-17 ENCOUNTER — RESULT REVIEW (OUTPATIENT)
Age: 47
End: 2022-02-17

## 2022-02-22 ENCOUNTER — OUTPATIENT (OUTPATIENT)
Dept: OUTPATIENT SERVICES | Facility: HOSPITAL | Age: 47
LOS: 1 days | End: 2022-02-22
Payer: MEDICARE

## 2022-02-22 ENCOUNTER — RESULT REVIEW (OUTPATIENT)
Age: 47
End: 2022-02-22

## 2022-02-22 DIAGNOSIS — C73 MALIGNANT NEOPLASM OF THYROID GLAND: ICD-10-CM

## 2022-02-22 DIAGNOSIS — R22.1 LOCALIZED SWELLING, MASS AND LUMP, NECK: ICD-10-CM

## 2022-02-22 DIAGNOSIS — Z98.890 OTHER SPECIFIED POSTPROCEDURAL STATES: Chronic | ICD-10-CM

## 2022-02-22 DIAGNOSIS — Z90.3 ACQUIRED ABSENCE OF STOMACH [PART OF]: Chronic | ICD-10-CM

## 2022-02-22 PROCEDURE — 88321 CONSLTJ&REPRT SLD PREP ELSWR: CPT

## 2022-02-25 PROBLEM — Z78.9 NON-SMOKER: Status: ACTIVE | Noted: 2022-02-25

## 2022-02-25 PROBLEM — Z86.39 HISTORY OF HYPOTHYROIDISM: Status: RESOLVED | Noted: 2022-02-25 | Resolved: 2022-02-25

## 2022-02-25 PROBLEM — Z78.9 SOCIAL ALCOHOL USE: Status: ACTIVE | Noted: 2022-02-25

## 2022-02-25 NOTE — REASON FOR VISIT
[Initial Consultation] : an initial consultation for [Referred By: ___] : Referred By: [unfilled] [FreeTextEntry2] : central neck mass/recurrent thyroid cancer

## 2022-02-25 NOTE — HISTORY OF PRESENT ILLNESS
[de-identified] : Ms. Seo is a 45 y/o female with a history of thyroid cancer.  She is s/p hemithyroidectomy in the 1990's for papillary thyroid cancer.  She developed contralateral recurrent thyroid cancer later and underwent completion thyroidectomy 2010.  She did not need adjuvant PATEL.  She does not remember pathology from second operation.\par Patient developed a palpable mass in the central neck and underwent ultrasound evaluation 09/2021 which demonstrated \par 2.1 x 3.3 x 1.6 midline vascular neck mass in the thyroidectomy bed.\par Ultrasound FNA 10/22/2021 demonstrated follicular cells.\par CT neck 11/19/2021 showed 3.7 x 1.6 x 2.1 cm mass in the central/left paracentral neck as well as a 11 x 7 mm hyperdense mass in the right right thyroidectomy bed.\par She has a endocrinologist appointment in mid-March.\par She recently established medical oncology care with Dr. Saadia Contreras.

## 2022-02-25 NOTE — ASSESSMENT
[FreeTextEntry1] : 47 y/o y/o female presents with a neck mass in her previous thyroidectomy bed for which FNA shows follicular cells.\par This is consistent with recurrent thyroid cancer.\par \par Plan: Repeat CT neck to re-evaluate central neck compartment and assess disease burden and resectability as last imaging was 3 months ago.  PET/CT scan to evaluate for distant metastatic disease is indicated.  Surgery would require central neck dissection.  Risk of recurrent laryngeal nerve injury/post-operative hypoparathyroidism explained in detail.  Await endocrinology evaluation.

## 2022-02-25 NOTE — PHYSICAL EXAM
[FreeTextEntry1] : Neck: well healed thyroidectomy incision.  Palpable mass/fullness in the central compartment consistent with mass seen on ultrasound/CT neck. [Normal Neck Lymph Nodes] : normal neck lymph nodes  [Normal Supraclavicular Lymph Nodes] : normal supraclavicular lymph nodes [Normal Groin Lymph Nodes] : normal groin lymph nodes [Normal Axillary Lymph Nodes] : normal axillary lymph nodes [Normal] : oriented to person, place and time, with appropriate affect

## 2022-02-25 NOTE — CONSULT LETTER
[Dear  ___] : Dear  [unfilled], [Courtesy Letter:] : I had the pleasure of seeing your patient, [unfilled], in my office today. [Please see my note below.] : Please see my note below. [Consult Closing:] : Thank you very much for allowing me to participate in the care of this patient.  If you have any questions, please do not hesitate to contact me. [Sincerely,] : Sincerely, [FreeTextEntry2] : Dr. Saadia Contreras [FreeTextEntry3] : Nayan Jaimes\par (M) [phone]\par (O) [phone] [DrJayda  ___] : Dr. GRAF

## 2022-02-28 ENCOUNTER — APPOINTMENT (OUTPATIENT)
Dept: NUCLEAR MEDICINE | Facility: CLINIC | Age: 47
End: 2022-02-28
Payer: MEDICAID

## 2022-02-28 ENCOUNTER — OUTPATIENT (OUTPATIENT)
Dept: OUTPATIENT SERVICES | Facility: HOSPITAL | Age: 47
LOS: 1 days | End: 2022-02-28
Payer: MEDICAID

## 2022-02-28 DIAGNOSIS — C73 MALIGNANT NEOPLASM OF THYROID GLAND: ICD-10-CM

## 2022-02-28 DIAGNOSIS — Z90.3 ACQUIRED ABSENCE OF STOMACH [PART OF]: Chronic | ICD-10-CM

## 2022-02-28 DIAGNOSIS — Z98.890 OTHER SPECIFIED POSTPROCEDURAL STATES: Chronic | ICD-10-CM

## 2022-02-28 PROCEDURE — 78815 PET IMAGE W/CT SKULL-THIGH: CPT

## 2022-02-28 PROCEDURE — A9552: CPT

## 2022-02-28 PROCEDURE — 78815 PET IMAGE W/CT SKULL-THIGH: CPT | Mod: 26,PS

## 2022-03-07 ENCOUNTER — OUTPATIENT (OUTPATIENT)
Dept: OUTPATIENT SERVICES | Facility: HOSPITAL | Age: 47
LOS: 1 days | End: 2022-03-07

## 2022-03-07 ENCOUNTER — APPOINTMENT (OUTPATIENT)
Dept: CT IMAGING | Facility: CLINIC | Age: 47
End: 2022-03-07
Payer: MEDICAID

## 2022-03-07 DIAGNOSIS — Z98.890 OTHER SPECIFIED POSTPROCEDURAL STATES: Chronic | ICD-10-CM

## 2022-03-07 DIAGNOSIS — Z90.3 ACQUIRED ABSENCE OF STOMACH [PART OF]: Chronic | ICD-10-CM

## 2022-03-07 DIAGNOSIS — C73 MALIGNANT NEOPLASM OF THYROID GLAND: ICD-10-CM

## 2022-03-07 PROCEDURE — 70491 CT SOFT TISSUE NECK W/DYE: CPT | Mod: 26

## 2022-04-05 ENCOUNTER — RESULT REVIEW (OUTPATIENT)
Age: 47
End: 2022-04-05

## 2022-04-07 ENCOUNTER — APPOINTMENT (OUTPATIENT)
Dept: ULTRASOUND IMAGING | Facility: IMAGING CENTER | Age: 47
End: 2022-04-07
Payer: MEDICAID

## 2022-04-07 ENCOUNTER — OUTPATIENT (OUTPATIENT)
Dept: OUTPATIENT SERVICES | Facility: HOSPITAL | Age: 47
LOS: 1 days | End: 2022-04-07
Payer: MEDICARE

## 2022-04-07 ENCOUNTER — RESULT REVIEW (OUTPATIENT)
Age: 47
End: 2022-04-07

## 2022-04-07 DIAGNOSIS — Z90.3 ACQUIRED ABSENCE OF STOMACH [PART OF]: Chronic | ICD-10-CM

## 2022-04-07 DIAGNOSIS — Z98.890 OTHER SPECIFIED POSTPROCEDURAL STATES: Chronic | ICD-10-CM

## 2022-04-07 DIAGNOSIS — Z00.8 ENCOUNTER FOR OTHER GENERAL EXAMINATION: ICD-10-CM

## 2022-04-07 PROCEDURE — 10005 FNA BX W/US GDN 1ST LES: CPT

## 2022-04-07 PROCEDURE — 88305 TISSUE EXAM BY PATHOLOGIST: CPT | Mod: 26

## 2022-04-07 PROCEDURE — 88305 TISSUE EXAM BY PATHOLOGIST: CPT

## 2022-04-07 PROCEDURE — 88173 CYTOPATH EVAL FNA REPORT: CPT | Mod: 26

## 2022-04-07 PROCEDURE — 88173 CYTOPATH EVAL FNA REPORT: CPT

## 2022-04-07 PROCEDURE — 88172 CYTP DX EVAL FNA 1ST EA SITE: CPT

## 2022-04-11 LAB — NON-GYNECOLOGICAL CYTOLOGY STUDY: SIGNIFICANT CHANGE UP

## 2022-05-25 ENCOUNTER — OUTPATIENT (OUTPATIENT)
Dept: OUTPATIENT SERVICES | Facility: HOSPITAL | Age: 47
LOS: 1 days | End: 2022-05-25

## 2022-05-25 VITALS
RESPIRATION RATE: 16 BRPM | TEMPERATURE: 95 F | OXYGEN SATURATION: 99 % | DIASTOLIC BLOOD PRESSURE: 90 MMHG | HEART RATE: 80 BPM | SYSTOLIC BLOOD PRESSURE: 129 MMHG | HEIGHT: 62.5 IN | WEIGHT: 186.07 LBS

## 2022-05-25 DIAGNOSIS — Z90.3 ACQUIRED ABSENCE OF STOMACH [PART OF]: Chronic | ICD-10-CM

## 2022-05-25 DIAGNOSIS — Z98.890 OTHER SPECIFIED POSTPROCEDURAL STATES: Chronic | ICD-10-CM

## 2022-05-25 DIAGNOSIS — N93.9 ABNORMAL UTERINE AND VAGINAL BLEEDING, UNSPECIFIED: Chronic | ICD-10-CM

## 2022-05-25 DIAGNOSIS — R22.1 LOCALIZED SWELLING, MASS AND LUMP, NECK: ICD-10-CM

## 2022-05-25 DIAGNOSIS — C73 MALIGNANT NEOPLASM OF THYROID GLAND: ICD-10-CM

## 2022-05-25 LAB
ANION GAP SERPL CALC-SCNC: 8 MMOL/L — SIGNIFICANT CHANGE UP (ref 7–14)
BLD GP AB SCN SERPL QL: NEGATIVE — SIGNIFICANT CHANGE UP
BUN SERPL-MCNC: 8 MG/DL — SIGNIFICANT CHANGE UP (ref 7–23)
CALCIUM SERPL-MCNC: 8.7 MG/DL — SIGNIFICANT CHANGE UP (ref 8.4–10.5)
CHLORIDE SERPL-SCNC: 102 MMOL/L — SIGNIFICANT CHANGE UP (ref 98–107)
CO2 SERPL-SCNC: 29 MMOL/L — SIGNIFICANT CHANGE UP (ref 22–31)
CREAT SERPL-MCNC: 0.7 MG/DL — SIGNIFICANT CHANGE UP (ref 0.5–1.3)
EGFR: 107 ML/MIN/1.73M2 — SIGNIFICANT CHANGE UP
GLUCOSE SERPL-MCNC: 82 MG/DL — SIGNIFICANT CHANGE UP (ref 70–99)
HCT VFR BLD CALC: 42.3 % — SIGNIFICANT CHANGE UP (ref 34.5–45)
HGB BLD-MCNC: 14.8 G/DL — SIGNIFICANT CHANGE UP (ref 11.5–15.5)
MCHC RBC-ENTMCNC: 35 GM/DL — SIGNIFICANT CHANGE UP (ref 32–36)
MCHC RBC-ENTMCNC: 37 PG — HIGH (ref 27–34)
MCV RBC AUTO: 105.8 FL — HIGH (ref 80–100)
NRBC # BLD: 0 /100 WBCS — SIGNIFICANT CHANGE UP
NRBC # FLD: 0 K/UL — SIGNIFICANT CHANGE UP
PLATELET # BLD AUTO: 212 K/UL — SIGNIFICANT CHANGE UP (ref 150–400)
POTASSIUM SERPL-MCNC: 3.9 MMOL/L — SIGNIFICANT CHANGE UP (ref 3.5–5.3)
POTASSIUM SERPL-SCNC: 3.9 MMOL/L — SIGNIFICANT CHANGE UP (ref 3.5–5.3)
RBC # BLD: 4 M/UL — SIGNIFICANT CHANGE UP (ref 3.8–5.2)
RBC # FLD: 12.5 % — SIGNIFICANT CHANGE UP (ref 10.3–14.5)
RH IG SCN BLD-IMP: POSITIVE — SIGNIFICANT CHANGE UP
SODIUM SERPL-SCNC: 139 MMOL/L — SIGNIFICANT CHANGE UP (ref 135–145)
WBC # BLD: 7.84 K/UL — SIGNIFICANT CHANGE UP (ref 3.8–10.5)
WBC # FLD AUTO: 7.84 K/UL — SIGNIFICANT CHANGE UP (ref 3.8–10.5)

## 2022-05-25 RX ORDER — SODIUM CHLORIDE 9 MG/ML
1000 INJECTION, SOLUTION INTRAVENOUS
Refills: 0 | Status: DISCONTINUED | OUTPATIENT
Start: 2022-05-31 | End: 2022-05-31

## 2022-05-25 NOTE — H&P PST ADULT - NSANTHOSAYNRD_GEN_A_CORE
No. TOD screening performed.  STOP BANG Legend: 0-2 = LOW Risk; 3-4 = INTERMEDIATE Risk; 5-8 = HIGH Risk

## 2022-05-25 NOTE — H&P PST ADULT - NSICDXPASTSURGICALHX_GEN_ALL_CORE_FT
PAST SURGICAL HISTORY:  Excessive vaginal bleeding hyterectomy Jan 2021 -- received 7 units PRBC    H/O thyroidectomy 1996 partial thyroidectomy and in 2011 total thyroidectomy    History of sleeve gastrectomy has lost 280 pounds

## 2022-05-25 NOTE — H&P PST ADULT - NSICDXPASTMEDICALHX_GEN_ALL_CORE_FT
PAST MEDICAL HISTORY:  Anxiety and depression     H/O iron deficiency anemia followed by hematology. Has received iron infusions. Last infusion in January 2021    History of hoarseness 2022    Lumbar spinal stenosis and bulging disc    Neck mass 2022    Pain cystic lesion anterior to the sacrum --- followed by neurologist    Thyroid cancer 1996 with ? left partial thyroidectomy with NO post op chemotherapy or RT and in 2011 with total thyroidectomy with NO post op chemotherapy or RT

## 2022-05-25 NOTE — H&P PST ADULT - PROBLEM SELECTOR PLAN 1
This is a 46 y/o female who is scheduled for central neck dissection on 5-31-22  * Instructed to speak with surgeon regarding covid testing  * Given preop and cleanser instructions with good teach back and patient verbalized understanding  * Instructed to take normal am dose of levothyroxine the am of surgery

## 2022-05-25 NOTE — H&P PST ADULT - HISTORY OF PRESENT ILLNESS
This is a 46 y/o female who presents with h/o ? left partial thyroidectomy in 1996 for thyroid cancer with no post op chemotherapy or RT. Was diagnosed with thyroid cancer in 2011 with total thyroidectomy and NO chemotherapy or RT. Presents with "neck mass" that is pressing on trachea causing hoarseness. Subsequent biopsy, CT scan, MRI, and PET scan confirm pathology. Scheduled for central neck dissection

## 2022-05-26 ENCOUNTER — APPOINTMENT (OUTPATIENT)
Dept: PLASTIC SURGERY | Facility: CLINIC | Age: 47
End: 2022-05-26
Payer: MEDICAID

## 2022-05-26 VITALS
DIASTOLIC BLOOD PRESSURE: 72 MMHG | SYSTOLIC BLOOD PRESSURE: 103 MMHG | HEART RATE: 76 BPM | HEIGHT: 63 IN | OXYGEN SATURATION: 99 % | TEMPERATURE: 96.3 F | BODY MASS INDEX: 31.89 KG/M2 | WEIGHT: 180 LBS

## 2022-05-26 PROCEDURE — 99204 OFFICE O/P NEW MOD 45 MIN: CPT

## 2022-05-26 RX ORDER — QUETIAPINE FUMARATE 50 MG/1
50 TABLET ORAL
Qty: 60 | Refills: 0 | Status: ACTIVE | COMMUNITY
Start: 2022-05-23

## 2022-05-26 RX ORDER — SERTRALINE HYDROCHLORIDE 50 MG/1
50 TABLET, FILM COATED ORAL
Qty: 30 | Refills: 0 | Status: ACTIVE | COMMUNITY
Start: 2022-05-23

## 2022-05-27 NOTE — CONSULT LETTER
[Dear  ___] : Dear  [unfilled], [Consult Letter:] : I had the pleasure of evaluating your patient, [unfilled]. [Please see my note below.] : Please see my note below. [Consult Closing:] : Thank you very much for allowing me to participate in the care of this patient.  If you have any questions, please do not hesitate to contact me. [Sincerely,] : Sincerely, [FreeTextEntry3] : Zach Queen MD, FACS

## 2022-05-30 ENCOUNTER — TRANSCRIPTION ENCOUNTER (OUTPATIENT)
Age: 47
End: 2022-05-30

## 2022-05-31 ENCOUNTER — APPOINTMENT (OUTPATIENT)
Dept: SURGICAL ONCOLOGY | Facility: HOSPITAL | Age: 47
End: 2022-05-31

## 2022-05-31 ENCOUNTER — RESULT REVIEW (OUTPATIENT)
Age: 47
End: 2022-05-31

## 2022-05-31 ENCOUNTER — INPATIENT (INPATIENT)
Facility: HOSPITAL | Age: 47
LOS: 2 days | Discharge: HOME | End: 2022-06-03
Attending: SURGERY | Admitting: PLASTIC SURGERY
Payer: MEDICAID

## 2022-05-31 ENCOUNTER — APPOINTMENT (OUTPATIENT)
Dept: PLASTIC SURGERY | Facility: HOSPITAL | Age: 47
End: 2022-05-31

## 2022-05-31 VITALS
RESPIRATION RATE: 15 BRPM | TEMPERATURE: 98 F | DIASTOLIC BLOOD PRESSURE: 77 MMHG | HEIGHT: 62.5 IN | SYSTOLIC BLOOD PRESSURE: 135 MMHG | HEART RATE: 82 BPM | WEIGHT: 186.07 LBS | OXYGEN SATURATION: 99 %

## 2022-05-31 DIAGNOSIS — C73 MALIGNANT NEOPLASM OF THYROID GLAND: ICD-10-CM

## 2022-05-31 DIAGNOSIS — Z90.3 ACQUIRED ABSENCE OF STOMACH [PART OF]: Chronic | ICD-10-CM

## 2022-05-31 DIAGNOSIS — Z98.890 OTHER SPECIFIED POSTPROCEDURAL STATES: Chronic | ICD-10-CM

## 2022-05-31 DIAGNOSIS — N93.9 ABNORMAL UTERINE AND VAGINAL BLEEDING, UNSPECIFIED: Chronic | ICD-10-CM

## 2022-05-31 LAB
CA-I BLD-SCNC: 1.07 MMOL/L — LOW (ref 1.15–1.29)
CALCIUM SERPL-MCNC: 8.4 MG/DL — SIGNIFICANT CHANGE UP (ref 8.4–10.5)

## 2022-05-31 PROCEDURE — 38720 REMOVAL OF LYMPH NODES NECK: CPT

## 2022-05-31 PROCEDURE — 88307 TISSUE EXAM BY PATHOLOGIST: CPT | Mod: 26

## 2022-05-31 PROCEDURE — 14301 TIS TRNFR ANY 30.1-60 SQ CM: CPT

## 2022-05-31 DEVICE — SURGICEL FIBRILLAR 2 X 4": Type: IMPLANTABLE DEVICE | Status: FUNCTIONAL

## 2022-05-31 DEVICE — SURGICEL 4 X 8": Type: IMPLANTABLE DEVICE | Status: FUNCTIONAL

## 2022-05-31 DEVICE — LIGATING CLIPS WECK HORIZON MEDIUM (BLUE) 24: Type: IMPLANTABLE DEVICE | Status: FUNCTIONAL

## 2022-05-31 DEVICE — SURGICEL 2 X 14": Type: IMPLANTABLE DEVICE | Status: FUNCTIONAL

## 2022-05-31 DEVICE — LIGATING CLIPS WECK HORIZON SMALL-WIDE (RED) 24: Type: IMPLANTABLE DEVICE | Status: FUNCTIONAL

## 2022-05-31 RX ORDER — CALCIUM GLUCONATE 100 MG/ML
2 VIAL (ML) INTRAVENOUS ONCE
Refills: 0 | Status: COMPLETED | OUTPATIENT
Start: 2022-05-31 | End: 2022-05-31

## 2022-05-31 RX ORDER — ONDANSETRON 8 MG/1
4 TABLET, FILM COATED ORAL ONCE
Refills: 0 | Status: DISCONTINUED | OUTPATIENT
Start: 2022-05-31 | End: 2022-05-31

## 2022-05-31 RX ORDER — HEPARIN SODIUM 5000 [USP'U]/ML
5000 INJECTION INTRAVENOUS; SUBCUTANEOUS EVERY 8 HOURS
Refills: 0 | Status: DISCONTINUED | OUTPATIENT
Start: 2022-05-31 | End: 2022-06-03

## 2022-05-31 RX ORDER — FENTANYL CITRATE 50 UG/ML
50 INJECTION INTRAVENOUS ONCE
Refills: 0 | Status: DISCONTINUED | OUTPATIENT
Start: 2022-05-31 | End: 2022-05-31

## 2022-05-31 RX ORDER — OXYCODONE HYDROCHLORIDE 5 MG/1
5 TABLET ORAL EVERY 4 HOURS
Refills: 0 | Status: DISCONTINUED | OUTPATIENT
Start: 2022-05-31 | End: 2022-06-03

## 2022-05-31 RX ORDER — HYDROMORPHONE HYDROCHLORIDE 2 MG/ML
0.5 INJECTION INTRAMUSCULAR; INTRAVENOUS; SUBCUTANEOUS
Refills: 0 | Status: DISCONTINUED | OUTPATIENT
Start: 2022-05-31 | End: 2022-05-31

## 2022-05-31 RX ORDER — QUETIAPINE FUMARATE 200 MG/1
50 TABLET, FILM COATED ORAL DAILY
Refills: 0 | Status: DISCONTINUED | OUTPATIENT
Start: 2022-05-31 | End: 2022-06-03

## 2022-05-31 RX ORDER — SODIUM CHLORIDE 9 MG/ML
1000 INJECTION, SOLUTION INTRAVENOUS
Refills: 0 | Status: DISCONTINUED | OUTPATIENT
Start: 2022-05-31 | End: 2022-06-01

## 2022-05-31 RX ORDER — LEVOTHYROXINE SODIUM 125 MCG
100 TABLET ORAL DAILY
Refills: 0 | Status: DISCONTINUED | OUTPATIENT
Start: 2022-05-31 | End: 2022-06-03

## 2022-05-31 RX ORDER — ACETAMINOPHEN 500 MG
975 TABLET ORAL EVERY 6 HOURS
Refills: 0 | Status: DISCONTINUED | OUTPATIENT
Start: 2022-05-31 | End: 2022-06-03

## 2022-05-31 RX ORDER — SERTRALINE 25 MG/1
50 TABLET, FILM COATED ORAL DAILY
Refills: 0 | Status: DISCONTINUED | OUTPATIENT
Start: 2022-05-31 | End: 2022-06-03

## 2022-05-31 RX ORDER — OXYCODONE HYDROCHLORIDE 5 MG/1
10 TABLET ORAL EVERY 4 HOURS
Refills: 0 | Status: DISCONTINUED | OUTPATIENT
Start: 2022-05-31 | End: 2022-06-03

## 2022-05-31 RX ADMIN — OXYCODONE HYDROCHLORIDE 10 MILLIGRAM(S): 5 TABLET ORAL at 20:22

## 2022-05-31 RX ADMIN — Medication 975 MILLIGRAM(S): at 20:22

## 2022-05-31 RX ADMIN — HEPARIN SODIUM 5000 UNIT(S): 5000 INJECTION INTRAVENOUS; SUBCUTANEOUS at 20:22

## 2022-05-31 RX ADMIN — Medication 975 MILLIGRAM(S): at 20:51

## 2022-05-31 RX ADMIN — OXYCODONE HYDROCHLORIDE 10 MILLIGRAM(S): 5 TABLET ORAL at 20:50

## 2022-05-31 RX ADMIN — HYDROMORPHONE HYDROCHLORIDE 0.5 MILLIGRAM(S): 2 INJECTION INTRAMUSCULAR; INTRAVENOUS; SUBCUTANEOUS at 16:45

## 2022-05-31 RX ADMIN — HYDROMORPHONE HYDROCHLORIDE 0.5 MILLIGRAM(S): 2 INJECTION INTRAMUSCULAR; INTRAVENOUS; SUBCUTANEOUS at 16:17

## 2022-05-31 RX ADMIN — Medication 200 GRAM(S): at 18:06

## 2022-05-31 NOTE — CHART NOTE - NSCHARTNOTEFT_GEN_A_CORE
Post Operative Check    Patient is post op from a Selective neck dissection     Vitals    T(C): 36.2 (05-31-22 @ 20:04), Max: 36.5 (05-31-22 @ 10:19)  HR: 93 (05-31-22 @ 20:04) (78 - 97)  BP: 124/74 (05-31-22 @ 20:04) (105/66 - 135/77)  RR: 18 (05-31-22 @ 20:04) (12 - 20)  SpO2: 97% (05-31-22 @ 20:04) (93% - 99%)      05-31 @ 07:01  -  05-31 @ 23:30  --------------------------------------------------------  IN:    Lactated Ringers: 150 mL    Oral Fluid: 300 mL  Total IN: 450 mL    OUT:    Bulb (mL): 12.5 mL    Voided (mL): 600 mL  Total OUT: 612.5 mL    Total NET: -162.5 mL          Labs          Physical Exam  General: sitting comfortably in bed  Neck: incision c/d/i, MERRY sang>sero w/o clots, neck soft without any palpable collections  Head: neg Chvostek's sign, CNs intact      Patient is a 47y old Female s/p selective neck dissection for likely residual thyroid CA    Plan:  - monitor Calcium levels  - restart home meds  - po pain control  - regular diet  - DVT prophylaxis  - dispo planning    [id number]

## 2022-05-31 NOTE — PROVIDER CONTACT NOTE (OTHER) - ACTION/TREATMENT ORDERED:
Provider advised may want to replete calcium level. Will place order if necessary. No interventions ordered by provider at this time.

## 2022-05-31 NOTE — BRIEF OPERATIVE NOTE - OPERATION/FINDINGS
Central neck dissection performed. Dominant midline mass approx 3-4cm in size completely excised. Closure by plastic surgery.

## 2022-06-01 ENCOUNTER — TRANSCRIPTION ENCOUNTER (OUTPATIENT)
Age: 47
End: 2022-06-01

## 2022-06-01 LAB
ANION GAP SERPL CALC-SCNC: 10 MMOL/L — SIGNIFICANT CHANGE UP (ref 7–14)
BUN SERPL-MCNC: 10 MG/DL — SIGNIFICANT CHANGE UP (ref 7–23)
CALCIUM SERPL-MCNC: 8.7 MG/DL — SIGNIFICANT CHANGE UP (ref 8.4–10.5)
CHLORIDE SERPL-SCNC: 101 MMOL/L — SIGNIFICANT CHANGE UP (ref 98–107)
CO2 SERPL-SCNC: 25 MMOL/L — SIGNIFICANT CHANGE UP (ref 22–31)
CREAT SERPL-MCNC: 0.71 MG/DL — SIGNIFICANT CHANGE UP (ref 0.5–1.3)
EGFR: 105 ML/MIN/1.73M2 — SIGNIFICANT CHANGE UP
GLUCOSE SERPL-MCNC: 145 MG/DL — HIGH (ref 70–99)
HCT VFR BLD CALC: 38.4 % — SIGNIFICANT CHANGE UP (ref 34.5–45)
HGB BLD-MCNC: 13.1 G/DL — SIGNIFICANT CHANGE UP (ref 11.5–15.5)
MAGNESIUM SERPL-MCNC: 2 MG/DL — SIGNIFICANT CHANGE UP (ref 1.6–2.6)
MCHC RBC-ENTMCNC: 34.1 GM/DL — SIGNIFICANT CHANGE UP (ref 32–36)
MCHC RBC-ENTMCNC: 37.2 PG — HIGH (ref 27–34)
MCV RBC AUTO: 109.1 FL — HIGH (ref 80–100)
NRBC # BLD: 0 /100 WBCS — SIGNIFICANT CHANGE UP
NRBC # FLD: 0 K/UL — SIGNIFICANT CHANGE UP
PHOSPHATE SERPL-MCNC: 2.7 MG/DL — SIGNIFICANT CHANGE UP (ref 2.5–4.5)
PLATELET # BLD AUTO: 199 K/UL — SIGNIFICANT CHANGE UP (ref 150–400)
POTASSIUM SERPL-MCNC: 4 MMOL/L — SIGNIFICANT CHANGE UP (ref 3.5–5.3)
POTASSIUM SERPL-SCNC: 4 MMOL/L — SIGNIFICANT CHANGE UP (ref 3.5–5.3)
RBC # BLD: 3.52 M/UL — LOW (ref 3.8–5.2)
RBC # FLD: 12.3 % — SIGNIFICANT CHANGE UP (ref 10.3–14.5)
SODIUM SERPL-SCNC: 136 MMOL/L — SIGNIFICANT CHANGE UP (ref 135–145)
WBC # BLD: 11.38 K/UL — HIGH (ref 3.8–10.5)
WBC # FLD AUTO: 11.38 K/UL — HIGH (ref 3.8–10.5)

## 2022-06-01 RX ORDER — OXYCODONE HYDROCHLORIDE 5 MG/1
1 TABLET ORAL
Qty: 12 | Refills: 0
Start: 2022-06-01 | End: 2022-06-03

## 2022-06-01 RX ORDER — INFLUENZA VIRUS VACCINE 15; 15; 15; 15 UG/.5ML; UG/.5ML; UG/.5ML; UG/.5ML
0.5 SUSPENSION INTRAMUSCULAR ONCE
Refills: 0 | Status: DISCONTINUED | OUTPATIENT
Start: 2022-06-01 | End: 2022-06-03

## 2022-06-01 RX ORDER — ACETAMINOPHEN 500 MG
3 TABLET ORAL
Qty: 0 | Refills: 0 | DISCHARGE
Start: 2022-06-01

## 2022-06-01 RX ADMIN — HEPARIN SODIUM 5000 UNIT(S): 5000 INJECTION INTRAVENOUS; SUBCUTANEOUS at 06:09

## 2022-06-01 RX ADMIN — SERTRALINE 50 MILLIGRAM(S): 25 TABLET, FILM COATED ORAL at 22:21

## 2022-06-01 RX ADMIN — OXYCODONE HYDROCHLORIDE 10 MILLIGRAM(S): 5 TABLET ORAL at 15:28

## 2022-06-01 RX ADMIN — Medication 975 MILLIGRAM(S): at 11:55

## 2022-06-01 RX ADMIN — OXYCODONE HYDROCHLORIDE 10 MILLIGRAM(S): 5 TABLET ORAL at 00:24

## 2022-06-01 RX ADMIN — OXYCODONE HYDROCHLORIDE 10 MILLIGRAM(S): 5 TABLET ORAL at 20:00

## 2022-06-01 RX ADMIN — OXYCODONE HYDROCHLORIDE 10 MILLIGRAM(S): 5 TABLET ORAL at 10:48

## 2022-06-01 RX ADMIN — OXYCODONE HYDROCHLORIDE 10 MILLIGRAM(S): 5 TABLET ORAL at 21:00

## 2022-06-01 RX ADMIN — QUETIAPINE FUMARATE 50 MILLIGRAM(S): 200 TABLET, FILM COATED ORAL at 22:21

## 2022-06-01 RX ADMIN — HEPARIN SODIUM 5000 UNIT(S): 5000 INJECTION INTRAVENOUS; SUBCUTANEOUS at 22:21

## 2022-06-01 RX ADMIN — OXYCODONE HYDROCHLORIDE 10 MILLIGRAM(S): 5 TABLET ORAL at 11:49

## 2022-06-01 RX ADMIN — Medication 975 MILLIGRAM(S): at 06:20

## 2022-06-01 RX ADMIN — OXYCODONE HYDROCHLORIDE 10 MILLIGRAM(S): 5 TABLET ORAL at 14:53

## 2022-06-01 RX ADMIN — OXYCODONE HYDROCHLORIDE 10 MILLIGRAM(S): 5 TABLET ORAL at 01:00

## 2022-06-01 RX ADMIN — Medication 975 MILLIGRAM(S): at 12:25

## 2022-06-01 RX ADMIN — OXYCODONE HYDROCHLORIDE 10 MILLIGRAM(S): 5 TABLET ORAL at 04:46

## 2022-06-01 RX ADMIN — Medication 100 MICROGRAM(S): at 06:08

## 2022-06-01 NOTE — DISCHARGE NOTE PROVIDER - CARE PROVIDER_API CALL
Nayan Jaimes)  Surgery  [address] [place] [address]  Phone: [phone]  Fax: [phone]  Follow Up Time: 1 week    Zach Queen)  ColonRectal Surgery; Plastic Surgery; Surgery; Surgery of the Hand  [address] [place] [address], [facility] [address]  [place] [address]  Phone: [phone]  Fax: [phone]  Follow Up Time: 1 week

## 2022-06-01 NOTE — DISCHARGE NOTE PROVIDER - NSDCCPCAREPLAN_GEN_ALL_CORE_FT
PRINCIPAL DISCHARGE DIAGNOSIS  Diagnosis: Neck mass  Assessment and Plan of Treatment: WOUND CARE:  Keep incision clean and dry. Do not rub or scrub wounds. Do not pick or peel steri strips they will fall off on their own. MERRY drain care as taught prior to discharge.  BATHING: Please do not submerge wound underwater. You may shower and/or sponge bathe.  ACTIVITY: No heavy lifting or straining. Otherwise, you may return to your usual level of physical activity. If you are taking narcotic pain medication (such as Percocet) DO NOT drive a car, operate machinery or make important decisions.  DIET: Return to your usual diet.  NOTIFY YOUR SURGEON IF: You have any bleeding that does not stop, any pus draining from your wound(s), any fever (over 100.4 F) or chills, persistent nausea/vomiting, persistent diarrhea, or if your pain is not controlled on your discharge pain medications.  FOLLOW-UP: Please follow up with your primary care physician in one week regarding your hospitalization

## 2022-06-01 NOTE — DISCHARGE NOTE PROVIDER - PROVIDER TOKENS
PROVIDER:[TOKEN:[[id number]],FOLLOWUP:[1 week]],PROVIDER:[TOKEN:[[id number]],FOLLOWUP:[1 week]]

## 2022-06-01 NOTE — PATIENT PROFILE ADULT - FALL HARM RISK - HARM RISK INTERVENTIONS
Communicate Risk of Fall with Harm to all staff/Monitor for mental status changes/Monitor gait and stability/Reinforce activity limits and safety measures with patient and family/Tailored Fall Risk Interventions/Visual Cue: Yellow wristband and red socks/Bed in lowest position, wheels locked, appropriate side rails in place/Call bell, personal items and telephone in reach/Instruct patient to call for assistance before getting out of bed or chair/Non-slip footwear when patient is out of bed/[place] to call system/Physically safe environment - no spills, clutter or unnecessary equipment/Purposeful Proactive Rounding/Room/bathroom lighting operational, light cord in reach

## 2022-06-01 NOTE — DISCHARGE NOTE PROVIDER - CARE PROVIDERS DIRECT ADDRESSES
[email or web address]

## 2022-06-01 NOTE — DISCHARGE NOTE PROVIDER - NSDCMRMEDTOKEN_GEN_ALL_CORE_FT
acetaminophen 325 mg oral tablet: 3 tab(s) orally every 6 hours  levothyroxine 100 mcg (0.1 mg) oral tablet: 1 tab(s) orally once a day  oxyCODONE 5 mg oral tablet: 1 tab(s) orally every 6 hours, As Needed MDD:4 tabs  QUEtiapine 50 mg oral tablet: 1 tab(s) orally once a day  sertraline 50 mg oral tablet: 1 tab(s) orally once a day

## 2022-06-01 NOTE — DISCHARGE NOTE PROVIDER - NSDCFUADDINST_GEN_ALL_CORE_FT
WOUND CARE:  Keep wound clean and dry. Do not scrub or rub incisions. Do not use lotion or powder on incisions.  BATHING: Please do not submerge wound underwater. You may shower and/or sponge bathe.  ACTIVITY: No heavy lifting or straining. Otherwise, you may return to your usual level of physical activity. If you are taking narcotic pain medication (such as Percocet) DO NOT drive a car, operate machinery or make important decisions.  DIET: Return to your usual diet.  NOTIFY YOUR SURGEON IF: You have any bleeding that does not stop, any pus draining from your wound(s), any fever (over 100.4 F) or chills, persistent nausea/vomiting, persistent diarrhea, or if your pain is not controlled on your discharge pain medications.  FOLLOW-UP: Please follow up with your primary care physician in one week regarding your hospitalization

## 2022-06-01 NOTE — PROGRESS NOTE ADULT - SUBJECTIVE AND OBJECTIVE BOX
Plastic Surgery Progress Note (pg [facility]: [id number], NS: [phone])    SUBJECTIVE  The patient was seen and examined. No acute events overnight. Complaining of some pain along incision this morning    OBJECTIVE  ___________________________________________________  VITAL SIGNS / I&O's   Vital Signs Last 24 Hrs  T(C): 36.2 (01 Jun 2022 04:51), Max: 36.5 (31 May 2022 10:19)  T(F): 97.2 (01 Jun 2022 04:51), Max: 97.7 (31 May 2022 10:19)  HR: 67 (01 Jun 2022 04:51) (67 - 97)  BP: 101/63 (01 Jun 2022 04:51) (101/63 - 135/77)  BP(mean): 74 (31 May 2022 18:30) (70 - 90)  RR: 18 (01 Jun 2022 04:51) (12 - 20)  SpO2: 97% (01 Jun 2022 04:51) (93% - 99%)      31 May 2022 07:01  -  01 Jun 2022 07:00  --------------------------------------------------------  IN:    Lactated Ringers: 650 mL    Oral Fluid: 300 mL  Total IN: 950 mL    OUT:    Bulb (mL): 42.5 mL    Voided (mL): 900 mL  Total OUT: 942.5 mL    Total NET: 7.5 mL        ___________________________________________________  PHYSICAL EXAM    -- CONSTITUTIONAL: NAD  -- HEENT: neck soft, no collections, steri strips in place, drain with appropriate serosanguinous drainage  ___________________________________________________  LABS                        13.1   11.38 )-----------( 199      ( 01 Jun 2022 06:46 )             38.4     01 Jun 2022 06:46    136    |  101    |  10     ----------------------------<  145    4.0     |  25     |  0.71     Ca    8.7        01 Jun 2022 06:46  Phos  2.7       01 Jun 2022 06:46  Mg     2.00      01 Jun 2022 06:46        CAPILLARY BLOOD GLUCOSE              ___________________________________________________  MICRO  Recent Cultures:    ___________________________________________________  MEDICATIONS  (STANDING):  acetaminophen     Tablet .. 975 milliGRAM(s) Oral every 6 hours  heparin   Injectable 5000 Unit(s) SubCutaneous every 8 hours  influenza   Vaccine 0.5 milliLiter(s) IntraMuscular once  levothyroxine 100 MICROGram(s) Oral daily  QUEtiapine 50 milliGRAM(s) Oral daily  sertraline 50 milliGRAM(s) Oral daily    MEDICATIONS  (PRN):  oxyCODONE    IR 5 milliGRAM(s) Oral every 4 hours PRN Moderate Pain (4 - 6)  oxyCODONE    IR 10 milliGRAM(s) Oral every 4 hours PRN Severe Pain (7 - 10)

## 2022-06-01 NOTE — DISCHARGE NOTE PROVIDER - NSDCFUSCHEDAPPT_GEN_ALL_CORE_FT
[facility] Physician [facility]  [address]  Scheduled Appointment: 06/06/2022

## 2022-06-01 NOTE — DISCHARGE NOTE PROVIDER - NSDCQMSTROKE_NEU_ALL_CORE
Consult Note    Dear Dr. Eduard Forman MD, thank you for referring Lissette Bautista for evaluation. Reason for Consult:  Colonoscopy evaluation    HISTORY OF PRESENT ILLNESS:    The patient is a 53 y.o. male who presents to discuss colonoscopy. His been 12 years since his last colonoscopy. He states he did not have any polyps then. He denies any family history colon cancer or colon polyps. He denies any change in bowel habits cutting diarrhea, constipation, or rectal bleeding. History reviewed. No pertinent past medical history. Past Surgical History:   Procedure Laterality Date    TONSILLECTOMY         Prior to Admission medications    Not on File       Scheduled Meds:  Continuous Infusions:  PRN Meds:    Allergies   Allergen Reactions    Pcn [Penicillins]      Upset stomach       Social History     Social History    Marital status:      Spouse name: N/A    Number of children: N/A    Years of education: N/A     Occupational History    Not on file. Social History Main Topics    Smoking status: Never Smoker    Smokeless tobacco: Current User     Types: Chew    Alcohol use No      Comment: stopped 14 years ago, was alcoholic    Drug use: No    Sexual activity: Not on file     Other Topics Concern    Not on file     Social History Narrative    No narrative on file       Family History   Problem Relation Age of Onset    Cancer Father        Review Of Systems:   Review of Systems   Constitutional: Negative for chills, fever, malaise/fatigue and weight loss. HENT: Negative for ear pain, nosebleeds, sore throat and tinnitus. Eyes: Negative for blurred vision, photophobia and redness. Respiratory: Negative for cough, hemoptysis, shortness of breath and wheezing. Cardiovascular: Negative for chest pain, palpitations and claudication. Gastrointestinal: Negative for abdominal pain, blood in stool, constipation, diarrhea, heartburn, melena, nausea and vomiting. No

## 2022-06-01 NOTE — PROGRESS NOTE ADULT - ASSESSMENT
Patient is a 47y old Female s/p selective neck dissection for likely residual thyroid CA    Plan:  - monitor Calcium levels  - restart home meds  - po pain control  - regular diet  - DVT prophylaxis  - dispo planning    00048.     Patient is a 47y old Female s/p selective neck dissection for likely residual thyroid CA    Plan:  - monitor Calcium levels  - c/w home meds  - po pain control  - regular diet  - DVT prophylaxis  - discharge home with MERRY drain.    D team surgery  [id number].

## 2022-06-01 NOTE — PROGRESS NOTE ADULT - ASSESSMENT
ASSESSMENT/PLAN: 47F  s/p central neck dissection and closure  - OK for discharge from PRS perspective, will go home with drain.  Follow up with Dr Queen next week  - Pain control  - IS  - Ambulate  - DVT prophylaxis    Plastic Surgery (pg [facility]: [id number], NS: [phone])

## 2022-06-01 NOTE — PROGRESS NOTE ADULT - SUBJECTIVE AND OBJECTIVE BOX
SUBJECTIVE: Pt seen and examined on rounds with team. NAEON.      VITALS  T(C): 36.4 (06-01-22 @ 00:23), Max: 36.5 (05-31-22 @ 10:19)  HR: 74 (06-01-22 @ 00:23) (74 - 97)  BP: 103/67 (06-01-22 @ 00:23) (103/67 - 135/77)  RR: 18 (06-01-22 @ 00:23) (12 - 20)  SpO2: 95% (06-01-22 @ 00:23) (93% - 99%)  CAPILLARY BLOOD GLUCOSE          Physical Exam  General: sitting comfortably in bed  Neck: incision c/d/i, MERRY sang>sero w/o clots, neck soft without any palpable collections  Head: neg Chvostek's sign, CNs intact    Is/Os    05-31 @ 07:01  -  06-01 @ 01:20  --------------------------------------------------------  IN:    Lactated Ringers: 450 mL    Oral Fluid: 300 mL  Total IN: 750 mL    OUT:    Bulb (mL): 42.5 mL    Voided (mL): 900 mL  Total OUT: 942.5 mL    Total NET: -192.5 mL          MEDICATIONS (STANDING): acetaminophen     Tablet .. 975 milliGRAM(s) Oral every 6 hours  heparin   Injectable 5000 Unit(s) SubCutaneous every 8 hours  lactated ringers. 1000 milliLiter(s) IV Continuous <Continuous>  levothyroxine 100 MICROGram(s) Oral daily  QUEtiapine 50 milliGRAM(s) Oral daily  sertraline 50 milliGRAM(s) Oral daily    MEDICATIONS (PRN):oxyCODONE    IR 5 milliGRAM(s) Oral every 4 hours PRN Moderate Pain (4 - 6)  oxyCODONE    IR 10 milliGRAM(s) Oral every 4 hours PRN Severe Pain (7 - 10)      LABS    BMP (05-31 @ 16:35)             --      |  --      |  --    		Ca++ 1.07<L>  Ca 8.4                ---------------------------------( --    		Mg --                 --      |  --      |  --    			Ph --                    IMAGING STUDIES       SUBJECTIVE: Pt seen and examined on rounds with teamJayda NAGY. c/o incisional pain this AM. Denies numbness or tingling.    VITALS  T(C): 36.4 (06-01-22 @ 00:23), Max: 36.5 (05-31-22 @ 10:19)  HR: 74 (06-01-22 @ 00:23) (74 - 97)  BP: 103/67 (06-01-22 @ 00:23) (103/67 - 135/77)  RR: 18 (06-01-22 @ 00:23) (12 - 20)  SpO2: 95% (06-01-22 @ 00:23) (93% - 99%)  CAPILLARY BLOOD GLUCOSE          Physical Exam  General: sitting comfortably in bed  Neck: incision c/d/i, MERRY ss, neck soft without any palpable collections  Head: neg Chvostek's sign, CNs intact    Is/Os    05-31 @ 07:01  -  06-01 @ 01:20  --------------------------------------------------------  IN:    Lactated Ringers: 450 mL    Oral Fluid: 300 mL  Total IN: 750 mL    OUT:    Bulb (mL): 42.5 mL    Voided (mL): 900 mL  Total OUT: 942.5 mL    Total NET: -192.5 mL          MEDICATIONS (STANDING): acetaminophen     Tablet .. 975 milliGRAM(s) Oral every 6 hours  heparin   Injectable 5000 Unit(s) SubCutaneous every 8 hours  lactated ringers. 1000 milliLiter(s) IV Continuous <Continuous>  levothyroxine 100 MICROGram(s) Oral daily  QUEtiapine 50 milliGRAM(s) Oral daily  sertraline 50 milliGRAM(s) Oral daily    MEDICATIONS (PRN):oxyCODONE    IR 5 milliGRAM(s) Oral every 4 hours PRN Moderate Pain (4 - 6)  oxyCODONE    IR 10 milliGRAM(s) Oral every 4 hours PRN Severe Pain (7 - 10)      LABS    BMP (05-31 @ 16:35)             --      |  --      |  --    		Ca++ 1.07<L>  Ca 8.4                ---------------------------------( --    		Mg --                 --      |  --      |  --    			Ph --                    IMAGING STUDIES

## 2022-06-01 NOTE — DISCHARGE NOTE PROVIDER - HOSPITAL COURSE
48 y/o female who presents with h/o left partial thyroidectomy in 1996 for thyroid cancer with no post op chemotherapy or RT. Was diagnosed with thyroid cancer in 2011 with total thyroidectomy and NO chemotherapy or RT. Presents with "neck mass" that is pressing on trachea causing hoarseness. Subsequent biopsy, CT scan, MRI, and PET scan confirm pathology. Scheduled for central neck dissection.  Patient admitted to [facility] surgical service and brought to the OR by Dr. Montiel where she underwent central neck dissection with removal of mass. Patient tolerated the procedure well without complication. Transferred to surgical floor for recovery. Patient currently tolerating regular diet, voiding, ambulating, and pain is well controlled.  Per team and attending patient is hemodynamically stable for discharge home with MERRY drain follow up with Dr. Montiel in one week.

## 2022-06-02 LAB
ANION GAP SERPL CALC-SCNC: 8 MMOL/L — SIGNIFICANT CHANGE UP (ref 7–14)
BUN SERPL-MCNC: 12 MG/DL — SIGNIFICANT CHANGE UP (ref 7–23)
CA-I BLD-SCNC: 1.11 MMOL/L — LOW (ref 1.15–1.29)
CALCIUM SERPL-MCNC: 8.2 MG/DL — LOW (ref 8.4–10.5)
CHLORIDE SERPL-SCNC: 103 MMOL/L — SIGNIFICANT CHANGE UP (ref 98–107)
CO2 SERPL-SCNC: 27 MMOL/L — SIGNIFICANT CHANGE UP (ref 22–31)
CREAT SERPL-MCNC: 0.82 MG/DL — SIGNIFICANT CHANGE UP (ref 0.5–1.3)
EGFR: 88 ML/MIN/1.73M2 — SIGNIFICANT CHANGE UP
GLUCOSE SERPL-MCNC: 83 MG/DL — SIGNIFICANT CHANGE UP (ref 70–99)
HCT VFR BLD CALC: 35.9 % — SIGNIFICANT CHANGE UP (ref 34.5–45)
HGB BLD-MCNC: 12 G/DL — SIGNIFICANT CHANGE UP (ref 11.5–15.5)
MAGNESIUM SERPL-MCNC: 2 MG/DL — SIGNIFICANT CHANGE UP (ref 1.6–2.6)
MCHC RBC-ENTMCNC: 33.4 GM/DL — SIGNIFICANT CHANGE UP (ref 32–36)
MCHC RBC-ENTMCNC: 37.2 PG — HIGH (ref 27–34)
MCV RBC AUTO: 111.1 FL — HIGH (ref 80–100)
NRBC # BLD: 0 /100 WBCS — SIGNIFICANT CHANGE UP
NRBC # FLD: 0 K/UL — SIGNIFICANT CHANGE UP
PHOSPHATE SERPL-MCNC: 3.4 MG/DL — SIGNIFICANT CHANGE UP (ref 2.5–4.5)
PLATELET # BLD AUTO: 169 K/UL — SIGNIFICANT CHANGE UP (ref 150–400)
POTASSIUM SERPL-MCNC: 3.7 MMOL/L — SIGNIFICANT CHANGE UP (ref 3.5–5.3)
POTASSIUM SERPL-SCNC: 3.7 MMOL/L — SIGNIFICANT CHANGE UP (ref 3.5–5.3)
RBC # BLD: 3.23 M/UL — LOW (ref 3.8–5.2)
RBC # FLD: 12.7 % — SIGNIFICANT CHANGE UP (ref 10.3–14.5)
SODIUM SERPL-SCNC: 138 MMOL/L — SIGNIFICANT CHANGE UP (ref 135–145)
WBC # BLD: 5.44 K/UL — SIGNIFICANT CHANGE UP (ref 3.8–10.5)
WBC # FLD AUTO: 5.44 K/UL — SIGNIFICANT CHANGE UP (ref 3.8–10.5)

## 2022-06-02 RX ORDER — CALCIUM GLUCONATE 100 MG/ML
2 VIAL (ML) INTRAVENOUS ONCE
Refills: 0 | Status: COMPLETED | OUTPATIENT
Start: 2022-06-02 | End: 2022-06-02

## 2022-06-02 RX ADMIN — QUETIAPINE FUMARATE 50 MILLIGRAM(S): 200 TABLET, FILM COATED ORAL at 22:31

## 2022-06-02 RX ADMIN — OXYCODONE HYDROCHLORIDE 10 MILLIGRAM(S): 5 TABLET ORAL at 05:27

## 2022-06-02 RX ADMIN — Medication 200 GRAM(S): at 09:14

## 2022-06-02 RX ADMIN — OXYCODONE HYDROCHLORIDE 10 MILLIGRAM(S): 5 TABLET ORAL at 20:54

## 2022-06-02 RX ADMIN — OXYCODONE HYDROCHLORIDE 10 MILLIGRAM(S): 5 TABLET ORAL at 11:51

## 2022-06-02 RX ADMIN — OXYCODONE HYDROCHLORIDE 10 MILLIGRAM(S): 5 TABLET ORAL at 22:20

## 2022-06-02 RX ADMIN — OXYCODONE HYDROCHLORIDE 10 MILLIGRAM(S): 5 TABLET ORAL at 06:51

## 2022-06-02 RX ADMIN — OXYCODONE HYDROCHLORIDE 10 MILLIGRAM(S): 5 TABLET ORAL at 17:23

## 2022-06-02 RX ADMIN — Medication 100 MICROGRAM(S): at 05:28

## 2022-06-02 RX ADMIN — OXYCODONE HYDROCHLORIDE 10 MILLIGRAM(S): 5 TABLET ORAL at 16:52

## 2022-06-02 RX ADMIN — OXYCODONE HYDROCHLORIDE 10 MILLIGRAM(S): 5 TABLET ORAL at 12:21

## 2022-06-02 RX ADMIN — SERTRALINE 50 MILLIGRAM(S): 25 TABLET, FILM COATED ORAL at 22:31

## 2022-06-02 NOTE — PROGRESS NOTE ADULT - SUBJECTIVE AND OBJECTIVE BOX
Plastic Surgery Progress Note (pg [facility]: [id number], [facility]: [phone])    SUBJECTIVE  The patient was seen and examined. No acute events overnight. Pt complains of pain along incision    OBJECTIVE  ___________________________________________________  VITAL SIGNS / I&O's   Vital Signs Last 24 Hrs  T(C): 36.5 (02 Jun 2022 05:44), Max: 36.5 (01 Jun 2022 17:18)  T(F): 97.7 (02 Jun 2022 05:44), Max: 97.7 (01 Jun 2022 17:18)  HR: 70 (02 Jun 2022 05:44) (62 - 71)  BP: 103/66 (02 Jun 2022 05:44) (95/63 - 105/63)  BP(mean): --  RR: 18 (02 Jun 2022 05:44) (18 - 18)  SpO2: 98% (02 Jun 2022 05:44) (95% - 98%)      01 Jun 2022 07:01  -  02 Jun 2022 07:00  --------------------------------------------------------  IN:    Oral Fluid: 400 mL  Total IN: 400 mL    OUT:    Bulb (mL): 65 mL    Voided (mL): 1200 mL  Total OUT: 1265 mL    Total NET: -865 mL        ___________________________________________________  PHYSICAL EXAM    PHYSICAL EXAM    -- CONSTITUTIONAL: NAD  -- HEENT: neck soft, no collections, steri strips in place, drain with appropriate serosanguinous drainage    ___________________________________________________  LABS                        12.0   5.44  )-----------( 169      ( 02 Jun 2022 05:45 )             35.9     01 Jun 2022 06:46    136    |  101    |  10     ----------------------------<  145    4.0     |  25     |  0.71     Ca    8.7        01 Jun 2022 06:46  Phos  2.7       01 Jun 2022 06:46  Mg     2.00      01 Jun 2022 06:46        CAPILLARY BLOOD GLUCOSE              ___________________________________________________  MICRO  Recent Cultures:    ___________________________________________________  MEDICATIONS  (STANDING):  acetaminophen     Tablet .. 975 milliGRAM(s) Oral every 6 hours  heparin   Injectable 5000 Unit(s) SubCutaneous every 8 hours  influenza   Vaccine 0.5 milliLiter(s) IntraMuscular once  levothyroxine 100 MICROGram(s) Oral daily  QUEtiapine 50 milliGRAM(s) Oral daily  sertraline 50 milliGRAM(s) Oral daily    MEDICATIONS  (PRN):  oxyCODONE    IR 5 milliGRAM(s) Oral every 4 hours PRN Moderate Pain (4 - 6)  oxyCODONE    IR 10 milliGRAM(s) Oral every 4 hours PRN Severe Pain (7 - 10)

## 2022-06-02 NOTE — PROGRESS NOTE ADULT - ASSESSMENT
Patient is a 47y old Female s/p selective neck dissection for likely residual thyroid CA    Plan:  - monitor Calcium levels  - c/w home meds  - po pain control  - regular diet  - DVT prophylaxis  - discharge home with MERRY drain.    D team surgery  [id number]. Patient is a 47y old Female s/p selective neck dissection for likely residual thyroid CA    Plan:  - monitor Calcium levels. Replete prn.  - c/w home meds  - po pain control  - regular diet  - DVT prophylaxis  - discharge home with MERRY drain today vs. tomorrow    D team surgery  [id number].

## 2022-06-02 NOTE — PROGRESS NOTE ADULT - ATTENDING COMMENTS
Agree with above.    Neck incision cdi, no hematoma, joseph serosang    Plan  Pain control  Ok to d/c from PRS standpoint  Plan as per Surg Onc

## 2022-06-02 NOTE — PROGRESS NOTE ADULT - ASSESSMENT
47F  s/p central neck dissection and closure  - OK for discharge from PRS perspective, will go home with drain.  Follow up with Dr Qeuen next week  - Pain control  - IS  - Ambulate  - DVT prophylaxis    Plastic Surgery (pg [facility]: [id number], NS: [phone])

## 2022-06-02 NOTE — PROGRESS NOTE ADULT - SUBJECTIVE AND OBJECTIVE BOX
Subjective:   Patient seen at bedside this AM. Reports feeling well, without complaints. Denies chest pain, SOB. Tolerating diet without N/V.     24h Events:   - Overnight, no acute events    Objective:  Vital Signs  T(C): 36.4 (06-02 @ 00:27), Max: 36.5 (06-01 @ 17:18)  HR: 71 (06-02 @ 00:27) (62 - 71)  BP: 103/60 (06-02 @ 00:27) (95/63 - 105/63)  RR: 18 (06-02 @ 00:27) (18 - 18)  SpO2: 97% (06-02 @ 00:27) (95% - 98%)  05-31-22 @ 07:01  -  06-01-22 @ 07:00  --------------------------------------------------------  IN:  Total IN: 0 mL    OUT:    Bulb (mL): 42.5 mL    Voided (mL): 900 mL  Total OUT: 942.5 mL    Total NET: -942.5 mL      06-01-22 @ 07:01  -  06-02-22 @ 01:34  --------------------------------------------------------  IN:  Total IN: 0 mL    OUT:    Bulb (mL): 45 mL    Voided (mL): 900 mL  Total OUT: 945 mL    Total NET: -945 mL      Physical Exam  General: sitting comfortably in bed  Neck: incision c/d/i, MERRY ss, neck soft without any palpable collections  Head: neg Chvostek's sign, CNs intact    Labs:                        13.1   11.38 )-----------( 199      ( 01 Jun 2022 06:46 )             38.4   06-01    136  |  101  |  10  ----------------------------<  145<H>  4.0   |  25  |  0.71    Ca    8.7      01 Jun 2022 06:46  Phos  2.7     06-01  Mg     2.00     06-01      CAPILLARY BLOOD GLUCOSE          Medications:   MEDICATIONS  (STANDING):  acetaminophen     Tablet .. 975 milliGRAM(s) Oral every 6 hours  heparin   Injectable 5000 Unit(s) SubCutaneous every 8 hours  influenza   Vaccine 0.5 milliLiter(s) IntraMuscular once  levothyroxine 100 MICROGram(s) Oral daily  QUEtiapine 50 milliGRAM(s) Oral daily  sertraline 50 milliGRAM(s) Oral daily    MEDICATIONS  (PRN):  oxyCODONE    IR 5 milliGRAM(s) Oral every 4 hours PRN Moderate Pain (4 - 6)  oxyCODONE    IR 10 milliGRAM(s) Oral every 4 hours PRN Severe Pain (7 - 10)      Imaging:     Subjective:   Patient seen at bedside this AM. Reports pain and light-headedness this AM. Denies chest pain, SOB. Tolerating diet without N/V.     24h Events:   - Overnight, no acute events    Objective:  Vital Signs  T(C): 36.4 (06-02 @ 00:27), Max: 36.5 (06-01 @ 17:18)  HR: 71 (06-02 @ 00:27) (62 - 71)  BP: 103/60 (06-02 @ 00:27) (95/63 - 105/63)  RR: 18 (06-02 @ 00:27) (18 - 18)  SpO2: 97% (06-02 @ 00:27) (95% - 98%)  05-31-22 @ 07:01  -  06-01-22 @ 07:00  --------------------------------------------------------  IN:  Total IN: 0 mL    OUT:    Bulb (mL): 42.5 mL    Voided (mL): 900 mL  Total OUT: 942.5 mL    Total NET: -942.5 mL      06-01-22 @ 07:01  -  06-02-22 @ 01:34  --------------------------------------------------------  IN:  Total IN: 0 mL    OUT:    Bulb (mL): 45 mL    Voided (mL): 900 mL  Total OUT: 945 mL    Total NET: -945 mL      Physical Exam  General: sitting comfortably in bed  Neck: incision c/d/i, MERRY ss, neck soft without any palpable collections  Head: neg Chvostek's sign, CNs intact    Labs:                        13.1   11.38 )-----------( 199      ( 01 Jun 2022 06:46 )             38.4   06-01    136  |  101  |  10  ----------------------------<  145<H>  4.0   |  25  |  0.71    Ca    8.7      01 Jun 2022 06:46  Phos  2.7     06-01  Mg     2.00     06-01      CAPILLARY BLOOD GLUCOSE          Medications:   MEDICATIONS  (STANDING):  acetaminophen     Tablet .. 975 milliGRAM(s) Oral every 6 hours  heparin   Injectable 5000 Unit(s) SubCutaneous every 8 hours  influenza   Vaccine 0.5 milliLiter(s) IntraMuscular once  levothyroxine 100 MICROGram(s) Oral daily  QUEtiapine 50 milliGRAM(s) Oral daily  sertraline 50 milliGRAM(s) Oral daily    MEDICATIONS  (PRN):  oxyCODONE    IR 5 milliGRAM(s) Oral every 4 hours PRN Moderate Pain (4 - 6)  oxyCODONE    IR 10 milliGRAM(s) Oral every 4 hours PRN Severe Pain (7 - 10)      Imaging:

## 2022-06-03 ENCOUNTER — TRANSCRIPTION ENCOUNTER (OUTPATIENT)
Age: 47
End: 2022-06-03

## 2022-06-03 VITALS
OXYGEN SATURATION: 99 % | HEART RATE: 85 BPM | RESPIRATION RATE: 18 BRPM | TEMPERATURE: 98 F | DIASTOLIC BLOOD PRESSURE: 69 MMHG | SYSTOLIC BLOOD PRESSURE: 118 MMHG

## 2022-06-03 LAB
ANION GAP SERPL CALC-SCNC: 7 MMOL/L — SIGNIFICANT CHANGE UP (ref 7–14)
BUN SERPL-MCNC: 13 MG/DL — SIGNIFICANT CHANGE UP (ref 7–23)
CALCIUM SERPL-MCNC: 8.1 MG/DL — LOW (ref 8.4–10.5)
CHLORIDE SERPL-SCNC: 103 MMOL/L — SIGNIFICANT CHANGE UP (ref 98–107)
CO2 SERPL-SCNC: 29 MMOL/L — SIGNIFICANT CHANGE UP (ref 22–31)
CREAT SERPL-MCNC: 0.84 MG/DL — SIGNIFICANT CHANGE UP (ref 0.5–1.3)
EGFR: 86 ML/MIN/1.73M2 — SIGNIFICANT CHANGE UP
GLUCOSE SERPL-MCNC: 87 MG/DL — SIGNIFICANT CHANGE UP (ref 70–99)
HCT VFR BLD CALC: 36.3 % — SIGNIFICANT CHANGE UP (ref 34.5–45)
HGB BLD-MCNC: 12 G/DL — SIGNIFICANT CHANGE UP (ref 11.5–15.5)
MAGNESIUM SERPL-MCNC: 1.8 MG/DL — SIGNIFICANT CHANGE UP (ref 1.6–2.6)
MCHC RBC-ENTMCNC: 33.1 GM/DL — SIGNIFICANT CHANGE UP (ref 32–36)
MCHC RBC-ENTMCNC: 36.4 PG — HIGH (ref 27–34)
MCV RBC AUTO: 110 FL — HIGH (ref 80–100)
NRBC # BLD: 0 /100 WBCS — SIGNIFICANT CHANGE UP
NRBC # FLD: 0 K/UL — SIGNIFICANT CHANGE UP
PHOSPHATE SERPL-MCNC: 3.5 MG/DL — SIGNIFICANT CHANGE UP (ref 2.5–4.5)
PLATELET # BLD AUTO: 174 K/UL — SIGNIFICANT CHANGE UP (ref 150–400)
POTASSIUM SERPL-MCNC: 4 MMOL/L — SIGNIFICANT CHANGE UP (ref 3.5–5.3)
POTASSIUM SERPL-SCNC: 4 MMOL/L — SIGNIFICANT CHANGE UP (ref 3.5–5.3)
RBC # BLD: 3.3 M/UL — LOW (ref 3.8–5.2)
RBC # FLD: 12.6 % — SIGNIFICANT CHANGE UP (ref 10.3–14.5)
SODIUM SERPL-SCNC: 139 MMOL/L — SIGNIFICANT CHANGE UP (ref 135–145)
WBC # BLD: 6.09 K/UL — SIGNIFICANT CHANGE UP (ref 3.8–10.5)
WBC # FLD AUTO: 6.09 K/UL — SIGNIFICANT CHANGE UP (ref 3.8–10.5)

## 2022-06-03 RX ADMIN — Medication 100 MICROGRAM(S): at 05:18

## 2022-06-03 RX ADMIN — OXYCODONE HYDROCHLORIDE 10 MILLIGRAM(S): 5 TABLET ORAL at 00:58

## 2022-06-03 RX ADMIN — OXYCODONE HYDROCHLORIDE 10 MILLIGRAM(S): 5 TABLET ORAL at 05:24

## 2022-06-03 RX ADMIN — OXYCODONE HYDROCHLORIDE 10 MILLIGRAM(S): 5 TABLET ORAL at 11:36

## 2022-06-03 RX ADMIN — OXYCODONE HYDROCHLORIDE 10 MILLIGRAM(S): 5 TABLET ORAL at 01:29

## 2022-06-03 RX ADMIN — OXYCODONE HYDROCHLORIDE 10 MILLIGRAM(S): 5 TABLET ORAL at 08:04

## 2022-06-03 NOTE — PROGRESS NOTE ADULT - ASSESSMENT
Patient is a 47y old Female s/p selective neck dissection for likely residual thyroid CA    Plan:  - monitor Calcium levels. Replete prn.  - c/w home meds  - po pain control  - regular diet  - DVT prophylaxis  - discharge home with MERRY drain today vs. tomorrow    D team surgery  [id number].

## 2022-06-03 NOTE — PROGRESS NOTE ADULT - SUBJECTIVE AND OBJECTIVE BOX
Subjective:   Patient seen at bedside this AM. Reports feeling well, without complaints. Denies chest pain, SOB. Tolerating diet without N/V.     24h Events:   - Overnight, no acute events    Objective:  Vital Signs  T(C): 37 (06-02 @ 23:04), Max: 37 (06-02 @ 23:04)  HR: 69 (06-02 @ 23:04) (61 - 75)  BP: 102/62 (06-02 @ 23:04) (97/58 - 112/62)  RR: 19 (06-02 @ 23:04) (18 - 19)  SpO2: 100% (06-02 @ 23:04) (97% - 100%)  06-01-22 @ 07:01  -  06-02-22 @ 07:00  --------------------------------------------------------  IN:  Total IN: 0 mL    OUT:    Bulb (mL): 65 mL    Voided (mL): 1200 mL  Total OUT: 1265 mL    Total NET: -1265 mL      06-02-22 @ 07:01  -  06-03-22 @ 02:08  --------------------------------------------------------  IN:  Total IN: 0 mL    OUT:    Bulb (mL): 40 mL    Voided (mL): 1050 mL  Total OUT: 1090 mL    Total NET: -1090 mL          Physical Exam  General: sitting comfortably in bed  Neck: incision c/d/i, MERRY ss, neck soft without any palpable collections  Head: neg Chvostek's sign, CNs intact    labs:                        12.0   5.44  )-----------( 169      ( 02 Jun 2022 05:45 )             35.9   06-02    138  |  103  |  12  ----------------------------<  83  3.7   |  27  |  0.82    Ca    8.2<L>      02 Jun 2022 05:45  Phos  3.4     06-02  Mg     2.00     06-02      CAPILLARY BLOOD GLUCOSE          Medications:   MEDICATIONS  (STANDING):  acetaminophen     Tablet .. 975 milliGRAM(s) Oral every 6 hours  heparin   Injectable 5000 Unit(s) SubCutaneous every 8 hours  influenza   Vaccine 0.5 milliLiter(s) IntraMuscular once  levothyroxine 100 MICROGram(s) Oral daily  QUEtiapine 50 milliGRAM(s) Oral daily  sertraline 50 milliGRAM(s) Oral daily    MEDICATIONS  (PRN):  oxyCODONE    IR 5 milliGRAM(s) Oral every 4 hours PRN Moderate Pain (4 - 6)  oxyCODONE    IR 10 milliGRAM(s) Oral every 4 hours PRN Severe Pain (7 - 10)      Imaging:

## 2022-06-03 NOTE — DISCHARGE NOTE NURSING/CASE MANAGEMENT/SOCIAL WORK - PATIENT PORTAL LINK FT
You can access the FollowMyHealth Patient Portal offered by [facility] by registering at the following website: [email or web address]. By joining [employer]’s FollowMyHealth portal, you will also be able to view your health information using other applications (apps) compatible with our system.

## 2022-06-03 NOTE — PROGRESS NOTE ADULT - ASSESSMENT
47F  s/p central neck dissection and closure    - OK for discharge from PRS perspective, will go home with drain.   - Follow up with Dr Queen on Monday  - Pain control  - IS  - Ambulate  - DVT prophylaxis    Plastic Surgery (pg [facility]: [id number], NS: [phone])

## 2022-06-03 NOTE — PROGRESS NOTE ADULT - SUBJECTIVE AND OBJECTIVE BOX
Plastic Surgery Progress Note (pg [facility]: [id number], NS: [phone])    SUBJECTIVE  The patient was seen and examined. No acute events overnight.    OBJECTIVE  ___________________________________________________  VITAL SIGNS / I&O's   Vital Signs Last 24 Hrs  T(C): 36.6 (03 Jun 2022 05:29), Max: 37 (02 Jun 2022 23:04)  T(F): 97.8 (03 Jun 2022 05:29), Max: 98.6 (02 Jun 2022 23:04)  HR: 65 (03 Jun 2022 05:29) (61 - 75)  BP: 103/61 (03 Jun 2022 05:29) (97/58 - 112/62)  BP(mean): --  RR: 19 (03 Jun 2022 05:29) (18 - 19)  SpO2: 100% (03 Jun 2022 05:29) (97% - 100%)      02 Jun 2022 07:01  -  03 Jun 2022 07:00  --------------------------------------------------------  IN:    Oral Fluid: 750 mL  Total IN: 750 mL    OUT:    Bulb (mL): 50 mL    Voided (mL): 1350 mL  Total OUT: 1400 mL    Total NET: -650 mL        ___________________________________________________  PHYSICAL EXAM    -- CONSTITUTIONAL: NAD  -- HEENT: neck soft, no collections, steri strips in place, drain with appropriate serosanguinous drainage  ___________________________________________________  LABS                        12.0   6.09  )-----------( 174      ( 03 Jun 2022 05:40 )             36.3     03 Jun 2022 05:40    139    |  103    |  13     ----------------------------<  87     4.0     |  29     |  0.84     Ca    8.1        03 Jun 2022 05:40  Phos  3.5       03 Jun 2022 05:40  Mg     1.80      03 Jun 2022 05:40        CAPILLARY BLOOD GLUCOSE              ___________________________________________________  MICRO  Recent Cultures:    ___________________________________________________  MEDICATIONS  (STANDING):  acetaminophen     Tablet .. 975 milliGRAM(s) Oral every 6 hours  heparin   Injectable 5000 Unit(s) SubCutaneous every 8 hours  influenza   Vaccine 0.5 milliLiter(s) IntraMuscular once  levothyroxine 100 MICROGram(s) Oral daily  QUEtiapine 50 milliGRAM(s) Oral daily  sertraline 50 milliGRAM(s) Oral daily    MEDICATIONS  (PRN):  oxyCODONE    IR 5 milliGRAM(s) Oral every 4 hours PRN Moderate Pain (4 - 6)  oxyCODONE    IR 10 milliGRAM(s) Oral every 4 hours PRN Severe Pain (7 - 10)

## 2022-06-03 NOTE — DISCHARGE NOTE NURSING/CASE MANAGEMENT/SOCIAL WORK - NSDCPEFALRISK_GEN_ALL_CORE
For information on Fall & Injury Prevention, visit: [email or web address] OR  [email or web address] OR  https://www.cdc.gov/steadi/patient.html

## 2022-06-08 LAB — SURGICAL PATHOLOGY STUDY: SIGNIFICANT CHANGE UP

## 2022-06-09 ENCOUNTER — APPOINTMENT (OUTPATIENT)
Dept: PLASTIC SURGERY | Facility: CLINIC | Age: 47
End: 2022-06-09
Payer: MEDICAID

## 2022-06-09 VITALS
OXYGEN SATURATION: 99 % | DIASTOLIC BLOOD PRESSURE: 91 MMHG | HEART RATE: 93 BPM | BODY MASS INDEX: 31.36 KG/M2 | HEIGHT: 63 IN | TEMPERATURE: 97.9 F | SYSTOLIC BLOOD PRESSURE: 141 MMHG | WEIGHT: 177 LBS

## 2022-06-09 PROCEDURE — 99024 POSTOP FOLLOW-UP VISIT: CPT

## 2022-06-16 ENCOUNTER — APPOINTMENT (OUTPATIENT)
Dept: PLASTIC SURGERY | Facility: CLINIC | Age: 47
End: 2022-06-16
Payer: MEDICAID

## 2022-06-16 VITALS
HEIGHT: 63 IN | BODY MASS INDEX: 31.18 KG/M2 | OXYGEN SATURATION: 96 % | WEIGHT: 176 LBS | HEART RATE: 89 BPM | SYSTOLIC BLOOD PRESSURE: 126 MMHG | DIASTOLIC BLOOD PRESSURE: 87 MMHG | TEMPERATURE: 97.3 F

## 2022-06-16 DIAGNOSIS — Z98.890 OTHER SPECIFIED POSTPROCEDURAL STATES: ICD-10-CM

## 2022-06-16 PROCEDURE — 99024 POSTOP FOLLOW-UP VISIT: CPT

## 2022-06-23 ENCOUNTER — APPOINTMENT (OUTPATIENT)
Dept: SURGICAL ONCOLOGY | Facility: CLINIC | Age: 47
End: 2022-06-23

## 2022-06-27 PROBLEM — Z98.890 POST-OPERATIVE STATE: Status: ACTIVE | Noted: 2022-06-09

## 2022-07-14 ENCOUNTER — APPOINTMENT (OUTPATIENT)
Dept: SURGICAL ONCOLOGY | Facility: CLINIC | Age: 47
End: 2022-07-14

## 2022-07-14 VITALS
TEMPERATURE: 97.7 F | SYSTOLIC BLOOD PRESSURE: 128 MMHG | DIASTOLIC BLOOD PRESSURE: 88 MMHG | BODY MASS INDEX: 31.36 KG/M2 | WEIGHT: 177 LBS | OXYGEN SATURATION: 97 % | RESPIRATION RATE: 17 BRPM | HEIGHT: 63 IN | HEART RATE: 74 BPM

## 2022-07-14 DIAGNOSIS — C73 MALIGNANT NEOPLASM OF THYROID GLAND: ICD-10-CM

## 2022-07-14 PROCEDURE — 99024 POSTOP FOLLOW-UP VISIT: CPT

## 2022-07-15 PROBLEM — C73 RECURRENT THYROID CANCER: Status: ACTIVE | Noted: 2022-02-15

## 2022-07-15 NOTE — HISTORY OF PRESENT ILLNESS
[de-identified] : Ms. Seo is a 45 y/o female with a history of thyroid cancer.  She is s/p hemithyroidectomy in the 1990's for papillary thyroid cancer.  She developed contralateral recurrent thyroid cancer later and underwent completion thyroidectomy 2010.  She did not need adjuvant PATEL.  She does not remember pathology from second operation.\par Patient developed a palpable mass in the central neck and underwent ultrasound evaluation 09/2021 which demonstrated \par 2.1 x 3.3 x 1.6 midline vascular neck mass in the thyroidectomy bed.\par Ultrasound FNA 10/22/2021 demonstrated follicular cells.\par CT neck 11/19/2021 showed 3.7 x 1.6 x 2.1 cm mass in the central/left paracentral neck as well as a 11 x 7 mm hyperdense mass in the right right thyroidectomy bed.\par She has a endocrinologist appointment in mid-March.\par She recently established medical oncology care with Dr. Saadia Contreras.\par \par 07/14/2022: Patient is s/p central neck exploration and excision of neck mass 05/31/2022.  She has recovered well.  She denies worsening voice hoarseness or paresthesias.\par Pathology: thyroid tissue with adenomatoid nodules, no evidence of recurrent thyroid cancer.

## 2022-07-15 NOTE — ASSESSMENT
[FreeTextEntry1] : Doing well after surgery.\par No evidence of recurrent thyroid cancer.\par To continue follow up with endocrinology.\par May follow up in office as clinically indicated.

## 2023-01-01 RX ADMIN — OXYCODONE HYDROCHLORIDE 10 MILLIGRAM(S): 5 TABLET ORAL at 23:11

## 2023-01-02 ENCOUNTER — INPATIENT (INPATIENT)
Facility: HOSPITAL | Age: 48
LOS: 4 days | Discharge: HOME | DRG: 603 | End: 2023-01-07
Attending: OTOLARYNGOLOGY | Admitting: OTOLARYNGOLOGY
Payer: MEDICAID

## 2023-01-02 VITALS
RESPIRATION RATE: 20 BRPM | DIASTOLIC BLOOD PRESSURE: 76 MMHG | TEMPERATURE: 97 F | WEIGHT: 175.93 LBS | SYSTOLIC BLOOD PRESSURE: 113 MMHG | HEART RATE: 75 BPM | OXYGEN SATURATION: 97 %

## 2023-01-02 DIAGNOSIS — N93.9 ABNORMAL UTERINE AND VAGINAL BLEEDING, UNSPECIFIED: Chronic | ICD-10-CM

## 2023-01-02 DIAGNOSIS — Z98.890 OTHER SPECIFIED POSTPROCEDURAL STATES: Chronic | ICD-10-CM

## 2023-01-02 DIAGNOSIS — Z90.3 ACQUIRED ABSENCE OF STOMACH [PART OF]: Chronic | ICD-10-CM

## 2023-01-02 LAB
ALBUMIN SERPL ELPH-MCNC: 3.1 G/DL — LOW (ref 3.3–5.2)
ALP SERPL-CCNC: 97 U/L — SIGNIFICANT CHANGE UP (ref 40–120)
ALT FLD-CCNC: 10 U/L — SIGNIFICANT CHANGE UP
ANION GAP SERPL CALC-SCNC: 11 MMOL/L — SIGNIFICANT CHANGE UP (ref 5–17)
ANION GAP SERPL CALC-SCNC: 12 MMOL/L — SIGNIFICANT CHANGE UP (ref 5–17)
ANISOCYTOSIS BLD QL: SLIGHT — SIGNIFICANT CHANGE UP
APTT BLD: 28.4 SEC — SIGNIFICANT CHANGE UP (ref 27.5–35.5)
AST SERPL-CCNC: 24 U/L — SIGNIFICANT CHANGE UP
BASE EXCESS BLDV CALC-SCNC: 1.9 MMOL/L — SIGNIFICANT CHANGE UP (ref -2–3)
BASOPHILS # BLD AUTO: 0.11 K/UL — SIGNIFICANT CHANGE UP (ref 0–0.2)
BASOPHILS NFR BLD AUTO: 0.8 % — SIGNIFICANT CHANGE UP (ref 0–2)
BILIRUB SERPL-MCNC: 0.3 MG/DL — LOW (ref 0.4–2)
BLD GP AB SCN SERPL QL: SIGNIFICANT CHANGE UP
BUN SERPL-MCNC: 6.1 MG/DL — LOW (ref 8–20)
BUN SERPL-MCNC: 6.1 MG/DL — LOW (ref 8–20)
CA-I SERPL-SCNC: 1.08 MMOL/L — LOW (ref 1.15–1.33)
CALCIUM SERPL-MCNC: 8 MG/DL — LOW (ref 8.4–10.5)
CALCIUM SERPL-MCNC: 8.2 MG/DL — LOW (ref 8.4–10.5)
CHLORIDE BLDV-SCNC: 100 MMOL/L — SIGNIFICANT CHANGE UP (ref 96–108)
CHLORIDE SERPL-SCNC: 100 MMOL/L — SIGNIFICANT CHANGE UP (ref 96–108)
CHLORIDE SERPL-SCNC: 98 MMOL/L — SIGNIFICANT CHANGE UP (ref 96–108)
CO2 SERPL-SCNC: 26 MMOL/L — SIGNIFICANT CHANGE UP (ref 22–29)
CO2 SERPL-SCNC: 26 MMOL/L — SIGNIFICANT CHANGE UP (ref 22–29)
CREAT SERPL-MCNC: 0.7 MG/DL — SIGNIFICANT CHANGE UP (ref 0.5–1.3)
CREAT SERPL-MCNC: 0.83 MG/DL — SIGNIFICANT CHANGE UP (ref 0.5–1.3)
EGFR: 107 ML/MIN/1.73M2 — SIGNIFICANT CHANGE UP
EGFR: 87 ML/MIN/1.73M2 — SIGNIFICANT CHANGE UP
EOSINOPHIL # BLD AUTO: 0 K/UL — SIGNIFICANT CHANGE UP (ref 0–0.5)
EOSINOPHIL NFR BLD AUTO: 0 % — SIGNIFICANT CHANGE UP (ref 0–6)
GAS PNL BLDV: 135 MMOL/L — LOW (ref 136–145)
GAS PNL BLDV: SIGNIFICANT CHANGE UP
GLUCOSE BLDV-MCNC: 95 MG/DL — SIGNIFICANT CHANGE UP (ref 70–99)
GLUCOSE SERPL-MCNC: 87 MG/DL — SIGNIFICANT CHANGE UP (ref 70–99)
GLUCOSE SERPL-MCNC: 88 MG/DL — SIGNIFICANT CHANGE UP (ref 70–99)
HCO3 BLDV-SCNC: 28 MMOL/L — SIGNIFICANT CHANGE UP (ref 22–29)
HCT VFR BLD CALC: 32.2 % — LOW (ref 34.5–45)
HCT VFR BLDA CALC: 35 % — SIGNIFICANT CHANGE UP
HGB BLD CALC-MCNC: 11.5 G/DL — LOW (ref 11.7–16.1)
HGB BLD-MCNC: 11.3 G/DL — LOW (ref 11.5–15.5)
INR BLD: 0.98 RATIO — SIGNIFICANT CHANGE UP (ref 0.88–1.16)
LACTATE BLDV-MCNC: 0.9 MMOL/L — SIGNIFICANT CHANGE UP (ref 0.5–2)
LYMPHOCYTES # BLD AUTO: 1.39 K/UL — SIGNIFICANT CHANGE UP (ref 1–3.3)
LYMPHOCYTES # BLD AUTO: 10.3 % — LOW (ref 13–44)
MACROCYTES BLD QL: SLIGHT — SIGNIFICANT CHANGE UP
MANUAL SMEAR VERIFICATION: SIGNIFICANT CHANGE UP
MCHC RBC-ENTMCNC: 35.1 GM/DL — SIGNIFICANT CHANGE UP (ref 32–36)
MCHC RBC-ENTMCNC: 37.4 PG — HIGH (ref 27–34)
MCV RBC AUTO: 106.6 FL — HIGH (ref 80–100)
METAMYELOCYTES # FLD: 0.9 % — HIGH (ref 0–0)
MONOCYTES # BLD AUTO: 0.7 K/UL — SIGNIFICANT CHANGE UP (ref 0–0.9)
MONOCYTES NFR BLD AUTO: 5.2 % — SIGNIFICANT CHANGE UP (ref 2–14)
NEUTROPHILS # BLD AUTO: 11.09 K/UL — HIGH (ref 1.8–7.4)
NEUTROPHILS NFR BLD AUTO: 80.2 % — HIGH (ref 43–77)
NEUTS BAND # BLD: 1.7 % — SIGNIFICANT CHANGE UP (ref 0–8)
PCO2 BLDV: 56 MMHG — HIGH (ref 39–42)
PH BLDV: 7.31 — LOW (ref 7.32–7.43)
PLAT MORPH BLD: NORMAL — SIGNIFICANT CHANGE UP
PLATELET # BLD AUTO: 388 K/UL — SIGNIFICANT CHANGE UP (ref 150–400)
PO2 BLDV: 49 MMHG — HIGH (ref 25–45)
POLYCHROMASIA BLD QL SMEAR: SLIGHT — SIGNIFICANT CHANGE UP
POTASSIUM BLDV-SCNC: 2.9 MMOL/L — CRITICAL LOW (ref 3.5–5.1)
POTASSIUM SERPL-MCNC: 2.8 MMOL/L — CRITICAL LOW (ref 3.5–5.3)
POTASSIUM SERPL-MCNC: 2.9 MMOL/L — CRITICAL LOW (ref 3.5–5.3)
POTASSIUM SERPL-SCNC: 2.8 MMOL/L — CRITICAL LOW (ref 3.5–5.3)
POTASSIUM SERPL-SCNC: 2.9 MMOL/L — CRITICAL LOW (ref 3.5–5.3)
PROMYELOCYTES # FLD: 0.9 % — HIGH (ref 0–0)
PROT SERPL-MCNC: 6.3 G/DL — LOW (ref 6.6–8.7)
PROTHROM AB SERPL-ACNC: 11.4 SEC — SIGNIFICANT CHANGE UP (ref 10.5–13.4)
RBC # BLD: 3.02 M/UL — LOW (ref 3.8–5.2)
RBC # FLD: 12.1 % — SIGNIFICANT CHANGE UP (ref 10.3–14.5)
RBC BLD AUTO: ABNORMAL
SAO2 % BLDV: 78.9 % — SIGNIFICANT CHANGE UP
SODIUM SERPL-SCNC: 136 MMOL/L — SIGNIFICANT CHANGE UP (ref 135–145)
SODIUM SERPL-SCNC: 137 MMOL/L — SIGNIFICANT CHANGE UP (ref 135–145)
WBC # BLD: 13.54 K/UL — HIGH (ref 3.8–10.5)
WBC # FLD AUTO: 13.54 K/UL — HIGH (ref 3.8–10.5)

## 2023-01-02 PROCEDURE — 70487 CT MAXILLOFACIAL W/DYE: CPT | Mod: 26,MA

## 2023-01-02 PROCEDURE — 99285 EMERGENCY DEPT VISIT HI MDM: CPT

## 2023-01-02 RX ORDER — POTASSIUM CHLORIDE 20 MEQ
10 PACKET (EA) ORAL
Refills: 0 | Status: COMPLETED | OUTPATIENT
Start: 2023-01-02 | End: 2023-01-02

## 2023-01-02 RX ORDER — PIPERACILLIN AND TAZOBACTAM 4; .5 G/20ML; G/20ML
3.38 INJECTION, POWDER, LYOPHILIZED, FOR SOLUTION INTRAVENOUS ONCE
Refills: 0 | Status: DISCONTINUED | OUTPATIENT
Start: 2023-01-02 | End: 2023-01-02

## 2023-01-02 RX ORDER — MORPHINE SULFATE 50 MG/1
2 CAPSULE, EXTENDED RELEASE ORAL ONCE
Refills: 0 | Status: DISCONTINUED | OUTPATIENT
Start: 2023-01-02 | End: 2023-01-02

## 2023-01-02 RX ORDER — POTASSIUM CHLORIDE 20 MEQ
40 PACKET (EA) ORAL ONCE
Refills: 0 | Status: COMPLETED | OUTPATIENT
Start: 2023-01-02 | End: 2023-01-02

## 2023-01-02 RX ORDER — PIPERACILLIN AND TAZOBACTAM 4; .5 G/20ML; G/20ML
3.38 INJECTION, POWDER, LYOPHILIZED, FOR SOLUTION INTRAVENOUS EVERY 8 HOURS
Refills: 0 | Status: DISCONTINUED | OUTPATIENT
Start: 2023-01-03 | End: 2023-01-07

## 2023-01-02 RX ORDER — PIPERACILLIN AND TAZOBACTAM 4; .5 G/20ML; G/20ML
3.38 INJECTION, POWDER, LYOPHILIZED, FOR SOLUTION INTRAVENOUS ONCE
Refills: 0 | Status: COMPLETED | OUTPATIENT
Start: 2023-01-02 | End: 2023-01-02

## 2023-01-02 RX ORDER — MORPHINE SULFATE 50 MG/1
8 CAPSULE, EXTENDED RELEASE ORAL ONCE
Refills: 0 | Status: DISCONTINUED | OUTPATIENT
Start: 2023-01-02 | End: 2023-01-02

## 2023-01-02 RX ORDER — KETOROLAC TROMETHAMINE 30 MG/ML
15 SYRINGE (ML) INJECTION ONCE
Refills: 0 | Status: DISCONTINUED | OUTPATIENT
Start: 2023-01-02 | End: 2023-01-02

## 2023-01-02 RX ADMIN — MORPHINE SULFATE 8 MILLIGRAM(S): 50 CAPSULE, EXTENDED RELEASE ORAL at 17:38

## 2023-01-02 RX ADMIN — Medication 15 MILLIGRAM(S): at 20:29

## 2023-01-02 RX ADMIN — PIPERACILLIN AND TAZOBACTAM 200 GRAM(S): 4; .5 INJECTION, POWDER, LYOPHILIZED, FOR SOLUTION INTRAVENOUS at 17:44

## 2023-01-02 RX ADMIN — Medication 100 MILLIEQUIVALENT(S): at 20:07

## 2023-01-02 RX ADMIN — Medication 100 MILLIEQUIVALENT(S): at 21:08

## 2023-01-02 RX ADMIN — Medication 40 MILLIEQUIVALENT(S): at 20:29

## 2023-01-02 RX ADMIN — MORPHINE SULFATE 2 MILLIGRAM(S): 50 CAPSULE, EXTENDED RELEASE ORAL at 21:08

## 2023-01-02 NOTE — ED ADULT NURSE NOTE - OBJECTIVE STATEMENT
PT A&OX4.  PT stated that about a week ago she noted her face to be swelling.  PT stated that she started having sever pain in face from 0400. PT noted to have severe pain and swelling. Will continue to monitor.

## 2023-01-02 NOTE — ED PROVIDER NOTE - PHYSICAL EXAMINATION
Gen: NAD, AOx3  Head: NC prominent swelling and ttp to left upper jaw/maxillary region extending into left temple  HEENT: EOMI, oral mucosa moist, normal conjunctiva, neck supple,normal speech, +trismus, no ttp base of tongue or firmness, some ttp under mandible   Lung: CTAB, no respiratory distress  CV: rrr, no murmur, Normal perfusion  Abd: soft, NTND  MSK: No edema, no visible deformities  Neuro: No focal neurologic deficits  Skin: No rash   Psych: normal affect Gen: uncomfortable, AOx3  Head: NC prominent swelling and ttp to left upper jaw/maxillary region extending into left temple  HEENT: EOMI, oral mucosa moist, normal conjunctiva, neck supple, normal speech, + mild trismus, no ttp base of tongue or firmness, some ttp under mandible   Lung: CTAB, no respiratory distress  CV: rrr, no murmur, Normal perfusion  Abd: soft, NTND  MSK: No edema, no visible deformities  Neuro: No focal neurologic deficits  Skin: No rash   Psych: normal affect

## 2023-01-02 NOTE — ED PROVIDER NOTE - CLINICAL SUMMARY MEDICAL DECISION MAKING FREE TEXT BOX
Pt with poor dentitation and 1 week of increased swelling to face and 10/10 pain. clinically not ludwigs at this time. will get ct. abx,. labs. Pt signed out to incoming physician- further plan and disposition at their discretion Pt with poor dentition, history of thyroid CA not on current tx, no DM, 1 week of increased swelling to face and 10/10 pain. No fevers. no vision changes. HA/face pain is tthrobbing in nature. no pustular drainage from mouth. clinically not ludwigs at this time. will get ct. abx,. labs. Pt signed out to incoming physician- further plan and disposition at their discretion Transportation service

## 2023-01-02 NOTE — ED ADULT TRIAGE NOTE - CHIEF COMPLAINT QUOTE
Patient presents to ER C/O pain ans swelling to left face, reports HA, symptoms X 1 week, denies fever.

## 2023-01-02 NOTE — ED PROVIDER NOTE - PROGRESS NOTE DETAILS
Patient signs out to me by dr. Fernando pending CT and ENT. On my exam, patient is speaking in full sentences with edema and induration extending from temple along left side of face into left neck. no submandibular induration or tenderness, neck supple with full ROM. protecting airway. T pending, ENT consulted. K being repleted Pamella Aguilera MD Attending Physician- patient currently eating, received CT, pending results. accepted to ENT for surgery in AM likely. admitted on

## 2023-01-03 DIAGNOSIS — L02.01 CUTANEOUS ABSCESS OF FACE: ICD-10-CM

## 2023-01-03 LAB
ANION GAP SERPL CALC-SCNC: 8 MMOL/L — SIGNIFICANT CHANGE UP (ref 5–17)
ANION GAP SERPL CALC-SCNC: 9 MMOL/L — SIGNIFICANT CHANGE UP (ref 5–17)
BUN SERPL-MCNC: 7.4 MG/DL — LOW (ref 8–20)
BUN SERPL-MCNC: 9.3 MG/DL — SIGNIFICANT CHANGE UP (ref 8–20)
CALCIUM SERPL-MCNC: 7.7 MG/DL — LOW (ref 8.4–10.5)
CALCIUM SERPL-MCNC: 8.4 MG/DL — SIGNIFICANT CHANGE UP (ref 8.4–10.5)
CHLORIDE SERPL-SCNC: 97 MMOL/L — SIGNIFICANT CHANGE UP (ref 96–108)
CHLORIDE SERPL-SCNC: 97 MMOL/L — SIGNIFICANT CHANGE UP (ref 96–108)
CO2 SERPL-SCNC: 26 MMOL/L — SIGNIFICANT CHANGE UP (ref 22–29)
CO2 SERPL-SCNC: 29 MMOL/L — SIGNIFICANT CHANGE UP (ref 22–29)
CREAT SERPL-MCNC: 0.84 MG/DL — SIGNIFICANT CHANGE UP (ref 0.5–1.3)
CREAT SERPL-MCNC: 0.91 MG/DL — SIGNIFICANT CHANGE UP (ref 0.5–1.3)
EGFR: 78 ML/MIN/1.73M2 — SIGNIFICANT CHANGE UP
EGFR: 86 ML/MIN/1.73M2 — SIGNIFICANT CHANGE UP
GLUCOSE SERPL-MCNC: 100 MG/DL — HIGH (ref 70–99)
GLUCOSE SERPL-MCNC: 87 MG/DL — SIGNIFICANT CHANGE UP (ref 70–99)
HCT VFR BLD CALC: 29.9 % — LOW (ref 34.5–45)
HGB BLD-MCNC: 10.3 G/DL — LOW (ref 11.5–15.5)
MAGNESIUM SERPL-MCNC: 2.4 MG/DL — SIGNIFICANT CHANGE UP (ref 1.6–2.6)
MCHC RBC-ENTMCNC: 34.4 GM/DL — SIGNIFICANT CHANGE UP (ref 32–36)
MCHC RBC-ENTMCNC: 36.9 PG — HIGH (ref 27–34)
MCV RBC AUTO: 107.2 FL — HIGH (ref 80–100)
PLATELET # BLD AUTO: 364 K/UL — SIGNIFICANT CHANGE UP (ref 150–400)
POTASSIUM SERPL-MCNC: 2.4 MMOL/L — CRITICAL LOW (ref 3.5–5.3)
POTASSIUM SERPL-MCNC: 2.8 MMOL/L — CRITICAL LOW (ref 3.5–5.3)
POTASSIUM SERPL-SCNC: 2.4 MMOL/L — CRITICAL LOW (ref 3.5–5.3)
POTASSIUM SERPL-SCNC: 2.8 MMOL/L — CRITICAL LOW (ref 3.5–5.3)
RBC # BLD: 2.79 M/UL — LOW (ref 3.8–5.2)
RBC # FLD: 12.3 % — SIGNIFICANT CHANGE UP (ref 10.3–14.5)
SARS-COV-2 RNA SPEC QL NAA+PROBE: SIGNIFICANT CHANGE UP
SODIUM SERPL-SCNC: 132 MMOL/L — LOW (ref 135–145)
SODIUM SERPL-SCNC: 134 MMOL/L — LOW (ref 135–145)
WBC # BLD: 12.95 K/UL — HIGH (ref 3.8–10.5)
WBC # FLD AUTO: 12.95 K/UL — HIGH (ref 3.8–10.5)

## 2023-01-03 PROCEDURE — 10060 I&D ABSCESS SIMPLE/SINGLE: CPT | Mod: LT

## 2023-01-03 RX ORDER — MAGNESIUM SULFATE 500 MG/ML
2 VIAL (ML) INJECTION ONCE
Refills: 0 | Status: COMPLETED | OUTPATIENT
Start: 2023-01-03 | End: 2023-01-03

## 2023-01-03 RX ORDER — MORPHINE SULFATE 50 MG/1
2 CAPSULE, EXTENDED RELEASE ORAL EVERY 4 HOURS
Refills: 0 | Status: DISCONTINUED | OUTPATIENT
Start: 2023-01-03 | End: 2023-01-06

## 2023-01-03 RX ORDER — LEVOTHYROXINE SODIUM 125 MCG
100 TABLET ORAL DAILY
Refills: 0 | Status: DISCONTINUED | OUTPATIENT
Start: 2023-01-03 | End: 2023-01-07

## 2023-01-03 RX ORDER — BUPIVACAINE HCL/EPINEPHRINE/PF 0.5-1:200K
30 VIAL (ML) INJECTION ONCE
Refills: 0 | Status: DISCONTINUED | OUTPATIENT
Start: 2023-01-03 | End: 2023-01-07

## 2023-01-03 RX ORDER — SODIUM CHLORIDE 9 MG/ML
1000 INJECTION, SOLUTION INTRAVENOUS
Refills: 0 | Status: DISCONTINUED | OUTPATIENT
Start: 2023-01-03 | End: 2023-01-03

## 2023-01-03 RX ORDER — ACETAMINOPHEN 500 MG
1000 TABLET ORAL EVERY 6 HOURS
Refills: 0 | Status: COMPLETED | OUTPATIENT
Start: 2023-01-03 | End: 2023-01-04

## 2023-01-03 RX ORDER — SODIUM,POTASSIUM PHOSPHATES 278-250MG
1 POWDER IN PACKET (EA) ORAL
Refills: 0 | Status: DISCONTINUED | OUTPATIENT
Start: 2023-01-03 | End: 2023-01-06

## 2023-01-03 RX ORDER — QUETIAPINE FUMARATE 200 MG/1
50 TABLET, FILM COATED ORAL DAILY
Refills: 0 | Status: DISCONTINUED | OUTPATIENT
Start: 2023-01-03 | End: 2023-01-07

## 2023-01-03 RX ORDER — POTASSIUM CHLORIDE 20 MEQ
10 PACKET (EA) ORAL
Refills: 0 | Status: DISCONTINUED | OUTPATIENT
Start: 2023-01-03 | End: 2023-01-03

## 2023-01-03 RX ORDER — GABAPENTIN 400 MG/1
200 CAPSULE ORAL THREE TIMES A DAY
Refills: 0 | Status: DISCONTINUED | OUTPATIENT
Start: 2023-01-03 | End: 2023-01-07

## 2023-01-03 RX ORDER — ENOXAPARIN SODIUM 100 MG/ML
40 INJECTION SUBCUTANEOUS EVERY 24 HOURS
Refills: 0 | Status: DISCONTINUED | OUTPATIENT
Start: 2023-01-03 | End: 2023-01-07

## 2023-01-03 RX ORDER — SERTRALINE 25 MG/1
50 TABLET, FILM COATED ORAL DAILY
Refills: 0 | Status: DISCONTINUED | OUTPATIENT
Start: 2023-01-03 | End: 2023-01-07

## 2023-01-03 RX ORDER — ACETAMINOPHEN 500 MG
1000 TABLET ORAL ONCE
Refills: 0 | Status: COMPLETED | OUTPATIENT
Start: 2023-01-03 | End: 2023-01-03

## 2023-01-03 RX ORDER — LIDOCAINE HYDROCHLORIDE AND EPINEPHRINE 10; 10 MG/ML; UG/ML
10 INJECTION, SOLUTION INFILTRATION; PERINEURAL ONCE
Refills: 0 | Status: DISCONTINUED | OUTPATIENT
Start: 2023-01-03 | End: 2023-01-03

## 2023-01-03 RX ORDER — QUETIAPINE FUMARATE 200 MG/1
12.5 TABLET, FILM COATED ORAL AT BEDTIME
Refills: 0 | Status: DISCONTINUED | OUTPATIENT
Start: 2023-01-03 | End: 2023-01-07

## 2023-01-03 RX ORDER — POTASSIUM CHLORIDE 20 MEQ
40 PACKET (EA) ORAL EVERY 4 HOURS
Refills: 0 | Status: DISCONTINUED | OUTPATIENT
Start: 2023-01-03 | End: 2023-01-03

## 2023-01-03 RX ORDER — KETOROLAC TROMETHAMINE 30 MG/ML
15 SYRINGE (ML) INJECTION EVERY 4 HOURS
Refills: 0 | Status: DISCONTINUED | OUTPATIENT
Start: 2023-01-03 | End: 2023-01-06

## 2023-01-03 RX ORDER — MORPHINE SULFATE 50 MG/1
4 CAPSULE, EXTENDED RELEASE ORAL ONCE
Refills: 0 | Status: DISCONTINUED | OUTPATIENT
Start: 2023-01-03 | End: 2023-01-03

## 2023-01-03 RX ADMIN — PIPERACILLIN AND TAZOBACTAM 25 GRAM(S): 4; .5 INJECTION, POWDER, LYOPHILIZED, FOR SOLUTION INTRAVENOUS at 15:50

## 2023-01-03 RX ADMIN — Medication 25 GRAM(S): at 03:19

## 2023-01-03 RX ADMIN — Medication 15 MILLIGRAM(S): at 18:00

## 2023-01-03 RX ADMIN — Medication 400 MILLIGRAM(S): at 18:00

## 2023-01-03 RX ADMIN — PIPERACILLIN AND TAZOBACTAM 25 GRAM(S): 4; .5 INJECTION, POWDER, LYOPHILIZED, FOR SOLUTION INTRAVENOUS at 01:41

## 2023-01-03 RX ADMIN — SODIUM CHLORIDE 100 MILLILITER(S): 9 INJECTION, SOLUTION INTRAVENOUS at 06:05

## 2023-01-03 RX ADMIN — Medication 1 PACKET(S): at 18:10

## 2023-01-03 RX ADMIN — MORPHINE SULFATE 4 MILLIGRAM(S): 50 CAPSULE, EXTENDED RELEASE ORAL at 11:27

## 2023-01-03 RX ADMIN — Medication 15 MILLIGRAM(S): at 10:30

## 2023-01-03 RX ADMIN — Medication 100 MICROGRAM(S): at 06:05

## 2023-01-03 RX ADMIN — SERTRALINE 50 MILLIGRAM(S): 25 TABLET, FILM COATED ORAL at 12:16

## 2023-01-03 RX ADMIN — GABAPENTIN 200 MILLIGRAM(S): 400 CAPSULE ORAL at 20:47

## 2023-01-03 RX ADMIN — Medication 400 MILLIGRAM(S): at 02:32

## 2023-01-03 RX ADMIN — Medication 15 MILLIGRAM(S): at 09:15

## 2023-01-03 RX ADMIN — PIPERACILLIN AND TAZOBACTAM 25 GRAM(S): 4; .5 INJECTION, POWDER, LYOPHILIZED, FOR SOLUTION INTRAVENOUS at 07:44

## 2023-01-03 RX ADMIN — Medication 400 MILLIGRAM(S): at 12:17

## 2023-01-03 RX ADMIN — QUETIAPINE FUMARATE 12.5 MILLIGRAM(S): 200 TABLET, FILM COATED ORAL at 20:47

## 2023-01-03 RX ADMIN — GABAPENTIN 200 MILLIGRAM(S): 400 CAPSULE ORAL at 06:05

## 2023-01-03 RX ADMIN — MORPHINE SULFATE 4 MILLIGRAM(S): 50 CAPSULE, EXTENDED RELEASE ORAL at 11:18

## 2023-01-03 RX ADMIN — MORPHINE SULFATE 2 MILLIGRAM(S): 50 CAPSULE, EXTENDED RELEASE ORAL at 07:44

## 2023-01-03 RX ADMIN — MORPHINE SULFATE 2 MILLIGRAM(S): 50 CAPSULE, EXTENDED RELEASE ORAL at 20:48

## 2023-01-03 RX ADMIN — Medication 1 TABLET(S): at 13:54

## 2023-01-03 RX ADMIN — MORPHINE SULFATE 2 MILLIGRAM(S): 50 CAPSULE, EXTENDED RELEASE ORAL at 10:32

## 2023-01-03 RX ADMIN — QUETIAPINE FUMARATE 50 MILLIGRAM(S): 200 TABLET, FILM COATED ORAL at 12:17

## 2023-01-03 RX ADMIN — Medication 40 MILLIEQUIVALENT(S): at 11:16

## 2023-01-03 RX ADMIN — Medication 1 TABLET(S): at 20:47

## 2023-01-03 RX ADMIN — GABAPENTIN 200 MILLIGRAM(S): 400 CAPSULE ORAL at 12:49

## 2023-01-03 RX ADMIN — Medication 400 MILLIGRAM(S): at 06:05

## 2023-01-03 NOTE — H&P ADULT - ASSESSMENT
ASSESSMENT: Patient is a 47y old female with left mandibular abscess that was needle aspirated at the bedside.     PLAN:    - Admit to ENT surgery under Dr. John on  bed  - IV Zosyn  - f/u aspiration cultures  - NPO/IVF  - pain control  - DVT ppx  - OOB/ambulate  - Plan discussed with Attending, Dr. John

## 2023-01-03 NOTE — ED ADULT NURSE REASSESSMENT NOTE - NS ED NURSE REASSESS COMMENT FT1
Gave report to Dai in CDU, pt a&ox4, VSS, respirations even and unlabored on room air, pt has antibiotic running, pt is  admit, no distress noted.

## 2023-01-03 NOTE — PROGRESS NOTE ADULT - SUBJECTIVE AND OBJECTIVE BOX
ENT ISSUE/POD: facial abscesse    HPI: Pt w continued facial pain and swelling on IV zosyn. WBC 12.95 this am. Pt reports poor dentition w plans for extractions      PAST MEDICAL & SURGICAL HISTORY:  Thyroid cancer  1996 with ? left partial thyroidectomy with NO post op chemotherapy or RT and in 2011 with total thyroidectomy with NO post op chemotherapy or RT      Neck mass  2022      H/O iron deficiency anemia  followed by hematology. Has received iron infusions. Last infusion in January 2021      Anxiety and depression      History of hoarseness  2022      Lumbar spinal stenosis  and bulging disc      Pain  cystic lesion anterior to the sacrum --- followed by neurologist      H/O thyroidectomy  1996 partial thyroidectomy and in 2011 total thyroidectomy      History of sleeve gastrectomy  has lost 280 pounds      Excessive vaginal bleeding  hyterectomy Jan 2021 -- received 7 units PRBC        Allergies    No Known Allergies    Intolerances      MEDICATIONS  (STANDING):  acetaminophen   IVPB .. 1000 milliGRAM(s) IV Intermittent every 6 hours  bupivacaine  0.5%/epinephrine 1:[id number] Injectable 30 milliLiter(s) Local Injection once  calcium carbonate 1250 mG  + Vitamin D (OsCal 500 + D) 1 Tablet(s) Oral three times a day  enoxaparin Injectable 40 milliGRAM(s) SubCutaneous every 24 hours  gabapentin 200 milliGRAM(s) Oral three times a day  lactated ringers. 1000 milliLiter(s) (100 mL/Hr) IV Continuous <Continuous>  levothyroxine 100 MICROGram(s) Oral daily  piperacillin/tazobactam IVPB.. 3.375 Gram(s) IV Intermittent every 8 hours  potassium phosphate / sodium phosphate Powder (PHOS-NaK) 1 Packet(s) Oral two times a day  QUEtiapine 12.5 milliGRAM(s) Oral at bedtime  QUEtiapine 50 milliGRAM(s) Oral daily  sertraline 50 milliGRAM(s) Oral daily    MEDICATIONS  (PRN):  ketorolac   Injectable 15 milliGRAM(s) IV Push every 4 hours PRN Mild Pain (1 - 3)  morphine  - Injectable 2 milliGRAM(s) IV Push every 4 hours PRN Moderate Pain (4 - 6)      ROS:   ENT: all negative except as noted in HPI   Pulm: denies SOB, cough, hemoptysis  Neuro: denies numbness/tingling, loss of sensation  Endo: denies heat/cold intolerance, excessive sweating      Vital Signs Last 24 Hrs  T(C): 36.8 (03 Jan 2023 10:45), Max: 36.8 (03 Jan 2023 10:45)  T(F): 98.2 (03 Jan 2023 10:45), Max: 98.2 (03 Jan 2023 10:45)  HR: 62 (03 Jan 2023 10:45) (57 - 75)  BP: 93/61 (03 Jan 2023 10:45) (93/61 - 115/78)  BP(mean): --  RR: 18 (03 Jan 2023 10:45) (18 - 20)  SpO2: 96% (03 Jan 2023 10:45) (94% - 97%)    Parameters below as of 03 Jan 2023 07:57  Patient On (Oxygen Delivery Method): room air                              10.3   12.95 )-----------( 364      ( 03 Jan 2023 08:35 )             29.9    01-03    132<L>  |  97  |  7.4<L>  ----------------------------<  100<H>  2.4<LL>   |  26.0  |  0.84    Ca    7.7<L>      03 Jan 2023 08:35  Mg     2.4     01-03    TPro  6.3<L>  /  Alb  3.1<L>  /  TBili  0.3<L>  /  DBili  x   /  AST  24  /  ALT  10  /  AlkPhos  97  01-02   PT/INR - ( 02 Jan 2023 17:51 )   PT: 11.4 sec;   INR: 0.98 ratio         PTT - ( 02 Jan 2023 17:51 )  PTT:28.4 sec    PHYSICAL EXAM:  Gen: NAD  Skin: No rashes, bruises, or lesions  Head: Normocephalic, Atraumatic  Face: L mandibular induration extending to the temple, no fluctuance  Eyes: no scleral injection  Nose: Nares bilaterally patent, no discharge  Mouth: No Stridor / Drooling / Trismus.  Mucosa moist, tongue/uvula midline, oropharynx clear  Neck: Flat, supple, no lymphadenopathy, trachea midline, no masses  Lymphatic: No lymphadenopathy  Resp: breathing easily, no stridor  Neuro: facial nerve intact, no facial droop    Procedure:  L cheek cleaned with povidine iodine. .25% bupivicaine w 1:100,000 epi injected. 25 g used to further aspirate remaining edema. No further purulence withdrew ENT ISSUE/POD: facial abscesse    HPI: Pt w continued facial pain and swelling on IV zosyn. WBC 12.95 this am. Pt reports poor dentition w plans for extractions      PAST MEDICAL & SURGICAL HISTORY:  Thyroid cancer  1996 with ? left partial thyroidectomy with NO post op chemotherapy or RT and in 2011 with total thyroidectomy with NO post op chemotherapy or RT      Neck mass  2022      H/O iron deficiency anemia  followed by hematology. Has received iron infusions. Last infusion in January 2021      Anxiety and depression      History of hoarseness  2022      Lumbar spinal stenosis  and bulging disc      Pain  cystic lesion anterior to the sacrum --- followed by neurologist      H/O thyroidectomy  1996 partial thyroidectomy and in 2011 total thyroidectomy      History of sleeve gastrectomy  has lost 280 pounds      Excessive vaginal bleeding  hyterectomy Jan 2021 -- received 7 units PRBC        Allergies    No Known Allergies    Intolerances      MEDICATIONS  (STANDING):  acetaminophen   IVPB .. 1000 milliGRAM(s) IV Intermittent every 6 hours  bupivacaine  0.5%/epinephrine 1:[id number] Injectable 30 milliLiter(s) Local Injection once  calcium carbonate 1250 mG  + Vitamin D (OsCal 500 + D) 1 Tablet(s) Oral three times a day  enoxaparin Injectable 40 milliGRAM(s) SubCutaneous every 24 hours  gabapentin 200 milliGRAM(s) Oral three times a day  lactated ringers. 1000 milliLiter(s) (100 mL/Hr) IV Continuous <Continuous>  levothyroxine 100 MICROGram(s) Oral daily  piperacillin/tazobactam IVPB.. 3.375 Gram(s) IV Intermittent every 8 hours  potassium phosphate / sodium phosphate Powder (PHOS-NaK) 1 Packet(s) Oral two times a day  QUEtiapine 12.5 milliGRAM(s) Oral at bedtime  QUEtiapine 50 milliGRAM(s) Oral daily  sertraline 50 milliGRAM(s) Oral daily    MEDICATIONS  (PRN):  ketorolac   Injectable 15 milliGRAM(s) IV Push every 4 hours PRN Mild Pain (1 - 3)  morphine  - Injectable 2 milliGRAM(s) IV Push every 4 hours PRN Moderate Pain (4 - 6)      ROS:   ENT: all negative except as noted in HPI   Pulm: denies SOB, cough, hemoptysis  Neuro: denies numbness/tingling, loss of sensation  Endo: denies heat/cold intolerance, excessive sweating      Vital Signs Last 24 Hrs  T(C): 36.8 (03 Jan 2023 10:45), Max: 36.8 (03 Jan 2023 10:45)  T(F): 98.2 (03 Jan 2023 10:45), Max: 98.2 (03 Jan 2023 10:45)  HR: 62 (03 Jan 2023 10:45) (57 - 75)  BP: 93/61 (03 Jan 2023 10:45) (93/61 - 115/78)  BP(mean): --  RR: 18 (03 Jan 2023 10:45) (18 - 20)  SpO2: 96% (03 Jan 2023 10:45) (94% - 97%)    Parameters below as of 03 Jan 2023 07:57  Patient On (Oxygen Delivery Method): room air                              10.3   12.95 )-----------( 364      ( 03 Jan 2023 08:35 )             29.9    01-03    132<L>  |  97  |  7.4<L>  ----------------------------<  100<H>  2.4<LL>   |  26.0  |  0.84    Ca    7.7<L>      03 Jan 2023 08:35  Mg     2.4     01-03    TPro  6.3<L>  /  Alb  3.1<L>  /  TBili  0.3<L>  /  DBili  x   /  AST  24  /  ALT  10  /  AlkPhos  97  01-02   PT/INR - ( 02 Jan 2023 17:51 )   PT: 11.4 sec;   INR: 0.98 ratio         PTT - ( 02 Jan 2023 17:51 )  PTT:28.4 sec    PHYSICAL EXAM:  Gen: NAD  Skin: No rashes, bruises, or lesions  Head: Normocephalic, Atraumatic  Face: L mandibular induration extending to the temple, no fluctuance  Eyes: no scleral injection  Nose: Nares bilaterally patent, no discharge  Mouth: No Stridor / Drooling / Trismus.  Mucosa moist, tongue/uvula midline, poor dentition   Neck: Flat, supple, no lymphadenopathy, trachea midline, no masses  Lymphatic: No lymphadenopathy  Resp: breathing easily, no stridor  Neuro: facial nerve intact, no facial droop    Procedure:  L cheek cleaned with povidine iodine. .25% bupivicaine w 1:100,000 epi injected. 25 g used to further aspirate remaining edema. No further purulence withdrew

## 2023-01-03 NOTE — ED ADULT NURSE REASSESSMENT NOTE - NS ED NURSE REASSESS COMMENT FT1
Assumed care 0725, received report from Jay RN, pt pending admission for facial infection, pt provided pain medication 10/10 on pain scale stated by pt, pt placed on  monitoring, a&ox4, VSS, no distress noted.

## 2023-01-03 NOTE — H&P ADULT - HISTORY OF PRESENT ILLNESS
ENT SURGERY CONSULT     HPI: 47y Female with PMH of depression, anxiety, and thyroid cancer s/p left partial thyroidectomy (1996) and central neck dissection (5/22) who presented with left sided facial swelling and severe pain for the past 10 days. She went to her dentist who prescribed her Penicillin but the pain kept getting worse and her cheek getting larger. Denies any recent dental manipulation or trauma to the face. Able to swallow her saliva and breath normally.  Denies fever, chills, nausea, vomiting, chest pain, and sob.     ROS: 10-system review is otherwise negative except HPI above.      PAST MEDICAL & SURGICAL HISTORY:  Thyroid cancer  1996 with ? left partial thyroidectomy with NO post op chemotherapy or RT and in 2011 with total thyroidectomy with NO post op chemotherapy or RT  Neck mass  2022  H/O iron deficiency anemia  followed by hematology. Has received iron infusions. Last infusion in January 2021  Anxiety and depression  History of hoarseness  2022  Lumbar spinal stenosis  and bulging disc  Pain  cystic lesion anterior to the sacrum --- followed by neurologist  H/O thyroidectomy  1996 partial thyroidectomy and in 2011 total thyroidectomy  History of sleeve gastrectomy  has lost 280 pounds  Excessive vaginal bleeding  hyterectomy Jan 2021 -- received 7 units PRBC      FAMILY HISTORY:  No pertinent family history in first degree relatives    SOCIAL HISTORY:  Denies any toxic habits    ALLERGIES: NKA No Known Allergies    HOME MEDICATIONS:  acetaminophen 325 mg oral tablet: 3 tab(s) orally every 6 hours (01 Jun 2022 11:36)  levothyroxine 100 mcg (0.1 mg) oral tablet: 1 tab(s) orally once a day (31 May 2022 10:43)  QUEtiapine 50 mg oral tablet: 1 tab(s) orally once a day (31 May 2022 10:43)  sertraline 50 mg oral tablet: 1 tab(s) orally once a day (31 May 2022 10:43)  --------------------------------------------------------------------------------------------  PHYSICAL EXAM:   General: NAD, Lying in bed comfortably  Neuro: A+Ox3  HEENT: EOMI, PERRLA, MMM. Left facial swelling with induration from mandible up to temporal region that is tender to palpation with no skin erythema.   Cardio: RRR  Resp: Non labored breathing on RA  GI/Abd: Soft, NT/ND, no rebound/guarding, no masses palpated  Vascular: All 4 extremities warm and well perfused.   Pelvis: stable  Musculoskeletal: All 4 extremities moving spontaneously, no limitations, no spinal tenderness.  --------------------------------------------------------------------------------------------    LABS                 11.3   13.54  )----------(  388       ( 02 Jan 2023 17:51 )               32.2      137    |  100    |  6.1    ----------------------------<  88         ( 02 Jan 2023 22:32 )  2.9     |  26.0   |  0.70     Ca    8.2        ( 02 Jan 2023 22:32 )    TPro  6.3    /  Alb  3.1    /  TBili  0.3    /  DBili  x      /  AST  24     /  ALT  10     /  AlkPhos  97     ( 02 Jan 2023 17:51 )    LIVER FUNCTIONS - ( 02 Jan 2023 17:51 )  Alb: 3.1 g/dL / Pro: 6.3 g/dL / ALK PHOS: 97 U/L / ALT: 10 U/L / AST: 24 U/L / GGT: x           PT/INR -  11.4 sec / 0.98 ratio   ( 02 Jan 2023 17:51 )       PTT -  28.4 sec   ( 02 Jan 2023 17:51 )  CAPILLARY BLOOD GLUCOSE            17:51 - VBG - pH: 7.310 | pCO2: 56    | pO2: 49    | Lactate: 0.90     --------------------------------------------------------------------------------------------  IMAGING  < from: CT Maxillofacial w/ IV Cont (01.02.23 @ 23:52) >    ******PRELIMINARY REPORT******      ******PRELIMINARY REPORT******       ACC: [id number] EXAM:  CT MAXILLOFACIAL  IC                          PROCEDURE DATE:  01/02/2023    ******PRELIMINARY REPORT******      ******PRELIMINARY REPORT******           INTERPRETATION:  [facility] RADI[facility] PRELIMINARY REPORT    PROCEDURE INFORMATION:  Exam: CT Maxillofacial With Contrast  Exam date and time: 1/2/2023 11:27 PM  Age: 47 years old  Clinical indication: Facial abscess please extend to under mandible    TECHNIQUE:  Imaging protocol: Computed tomography of the face with contrast.  Contrast material: IV: OMNIPAQUE 350; Contrast volume: 90 ml; Contrast   route:  IV;    COMPARISON:  CT NECK SOFT TISSUE WITH IV CONTRAST 3/7/2022 2:54 PM    FINDINGS:  Orbital cavities: Orbits are normal. Globes are unremarkable.  Bones/joints: No acute fracture. The temporomandibular joints are   well-aligned.  Paranasal sinuses: Normal. No air-fluid levels.  Soft tissues: Large rim enhancing fluid collection the surroundsthe   mandibular  body and extends superiorly into the temporalis muscle.  The this is deep   to  the left sided zygomatic arch.  This measures 4.8 x 2.7 by 11 cm in size.   There  is no deep extension.    IMPRESSION:  Large abscess contained within the left sided mass sicker space measuring   4.8 x  2.7 x 11 cm in size. This extends from the mandibular body to the left  temporalis muscle attachment on the skull..  There is no area of lysis of   the  mandible.

## 2023-01-04 LAB
ANION GAP SERPL CALC-SCNC: 9 MMOL/L — SIGNIFICANT CHANGE UP (ref 5–17)
BASOPHILS # BLD AUTO: 0.05 K/UL — SIGNIFICANT CHANGE UP (ref 0–0.2)
BASOPHILS NFR BLD AUTO: 0.4 % — SIGNIFICANT CHANGE UP (ref 0–2)
BUN SERPL-MCNC: 9 MG/DL — SIGNIFICANT CHANGE UP (ref 8–20)
CALCIUM SERPL-MCNC: 8.1 MG/DL — LOW (ref 8.4–10.5)
CHLORIDE SERPL-SCNC: 99 MMOL/L — SIGNIFICANT CHANGE UP (ref 96–108)
CO2 SERPL-SCNC: 28 MMOL/L — SIGNIFICANT CHANGE UP (ref 22–29)
CREAT SERPL-MCNC: 0.89 MG/DL — SIGNIFICANT CHANGE UP (ref 0.5–1.3)
EGFR: 80 ML/MIN/1.73M2 — SIGNIFICANT CHANGE UP
EOSINOPHIL # BLD AUTO: 0.07 K/UL — SIGNIFICANT CHANGE UP (ref 0–0.5)
EOSINOPHIL NFR BLD AUTO: 0.6 % — SIGNIFICANT CHANGE UP (ref 0–6)
GLUCOSE SERPL-MCNC: 80 MG/DL — SIGNIFICANT CHANGE UP (ref 70–99)
HCT VFR BLD CALC: 31.4 % — LOW (ref 34.5–45)
HGB BLD-MCNC: 10.3 G/DL — LOW (ref 11.5–15.5)
IMM GRANULOCYTES NFR BLD AUTO: 0.4 % — SIGNIFICANT CHANGE UP (ref 0–0.9)
LYMPHOCYTES # BLD AUTO: 1.03 K/UL — SIGNIFICANT CHANGE UP (ref 1–3.3)
LYMPHOCYTES # BLD AUTO: 8.7 % — LOW (ref 13–44)
MAGNESIUM SERPL-MCNC: 2.2 MG/DL — SIGNIFICANT CHANGE UP (ref 1.6–2.6)
MCHC RBC-ENTMCNC: 32.8 GM/DL — SIGNIFICANT CHANGE UP (ref 32–36)
MCHC RBC-ENTMCNC: 36.1 PG — HIGH (ref 27–34)
MCV RBC AUTO: 110.2 FL — HIGH (ref 80–100)
MONOCYTES # BLD AUTO: 0.57 K/UL — SIGNIFICANT CHANGE UP (ref 0–0.9)
MONOCYTES NFR BLD AUTO: 4.8 % — SIGNIFICANT CHANGE UP (ref 2–14)
NEUTROPHILS # BLD AUTO: 10.02 K/UL — HIGH (ref 1.8–7.4)
NEUTROPHILS NFR BLD AUTO: 85.1 % — HIGH (ref 43–77)
PHOSPHATE SERPL-MCNC: 4.3 MG/DL — SIGNIFICANT CHANGE UP (ref 2.4–4.7)
PLATELET # BLD AUTO: 390 K/UL — SIGNIFICANT CHANGE UP (ref 150–400)
POTASSIUM SERPL-MCNC: 3.6 MMOL/L — SIGNIFICANT CHANGE UP (ref 3.5–5.3)
POTASSIUM SERPL-SCNC: 3.6 MMOL/L — SIGNIFICANT CHANGE UP (ref 3.5–5.3)
RBC # BLD: 2.85 M/UL — LOW (ref 3.8–5.2)
RBC # FLD: 12.6 % — SIGNIFICANT CHANGE UP (ref 10.3–14.5)
SODIUM SERPL-SCNC: 136 MMOL/L — SIGNIFICANT CHANGE UP (ref 135–145)
WBC # BLD: 11.79 K/UL — HIGH (ref 3.8–10.5)
WBC # FLD AUTO: 11.79 K/UL — HIGH (ref 3.8–10.5)

## 2023-01-04 RX ORDER — CALCIUM CARBONATE 500(1250)
1 TABLET ORAL EVERY 4 HOURS
Refills: 0 | Status: DISCONTINUED | OUTPATIENT
Start: 2023-01-04 | End: 2023-01-06

## 2023-01-04 RX ORDER — INFLUENZA VIRUS VACCINE 15; 15; 15; 15 UG/.5ML; UG/.5ML; UG/.5ML; UG/.5ML
0.5 SUSPENSION INTRAMUSCULAR ONCE
Refills: 0 | Status: DISCONTINUED | OUTPATIENT
Start: 2023-01-04 | End: 2023-01-07

## 2023-01-04 RX ORDER — DEXAMETHASONE 0.5 MG/5ML
8 ELIXIR ORAL THREE TIMES A DAY
Refills: 0 | Status: DISCONTINUED | OUTPATIENT
Start: 2023-01-04 | End: 2023-01-05

## 2023-01-04 RX ORDER — POTASSIUM CHLORIDE 20 MEQ
40 PACKET (EA) ORAL
Refills: 0 | Status: COMPLETED | OUTPATIENT
Start: 2023-01-04 | End: 2023-01-04

## 2023-01-04 RX ORDER — DEXAMETHASONE 0.5 MG/5ML
8 ELIXIR ORAL EVERY 8 HOURS
Refills: 0 | Status: DISCONTINUED | OUTPATIENT
Start: 2023-01-04 | End: 2023-01-04

## 2023-01-04 RX ORDER — POTASSIUM CHLORIDE 20 MEQ
10 PACKET (EA) ORAL
Refills: 0 | Status: COMPLETED | OUTPATIENT
Start: 2023-01-04 | End: 2023-01-04

## 2023-01-04 RX ADMIN — Medication 1 PACKET(S): at 05:00

## 2023-01-04 RX ADMIN — Medication 400 MILLIGRAM(S): at 00:42

## 2023-01-04 RX ADMIN — Medication 1 TABLET(S): at 13:27

## 2023-01-04 RX ADMIN — GABAPENTIN 200 MILLIGRAM(S): 400 CAPSULE ORAL at 21:04

## 2023-01-04 RX ADMIN — PIPERACILLIN AND TAZOBACTAM 25 GRAM(S): 4; .5 INJECTION, POWDER, LYOPHILIZED, FOR SOLUTION INTRAVENOUS at 23:55

## 2023-01-04 RX ADMIN — Medication 100 MILLIEQUIVALENT(S): at 05:00

## 2023-01-04 RX ADMIN — MORPHINE SULFATE 2 MILLIGRAM(S): 50 CAPSULE, EXTENDED RELEASE ORAL at 23:54

## 2023-01-04 RX ADMIN — MORPHINE SULFATE 2 MILLIGRAM(S): 50 CAPSULE, EXTENDED RELEASE ORAL at 08:08

## 2023-01-04 RX ADMIN — Medication 100 MILLIEQUIVALENT(S): at 06:07

## 2023-01-04 RX ADMIN — MORPHINE SULFATE 2 MILLIGRAM(S): 50 CAPSULE, EXTENDED RELEASE ORAL at 13:27

## 2023-01-04 RX ADMIN — MORPHINE SULFATE 2 MILLIGRAM(S): 50 CAPSULE, EXTENDED RELEASE ORAL at 03:20

## 2023-01-04 RX ADMIN — MORPHINE SULFATE 2 MILLIGRAM(S): 50 CAPSULE, EXTENDED RELEASE ORAL at 18:03

## 2023-01-04 RX ADMIN — Medication 1 TABLET(S): at 05:04

## 2023-01-04 RX ADMIN — Medication 15 MILLIGRAM(S): at 21:20

## 2023-01-04 RX ADMIN — PIPERACILLIN AND TAZOBACTAM 25 GRAM(S): 4; .5 INJECTION, POWDER, LYOPHILIZED, FOR SOLUTION INTRAVENOUS at 15:43

## 2023-01-04 RX ADMIN — GABAPENTIN 200 MILLIGRAM(S): 400 CAPSULE ORAL at 13:27

## 2023-01-04 RX ADMIN — Medication 15 MILLIGRAM(S): at 15:41

## 2023-01-04 RX ADMIN — Medication 15 MILLIGRAM(S): at 10:51

## 2023-01-04 RX ADMIN — Medication 40 MILLIEQUIVALENT(S): at 05:04

## 2023-01-04 RX ADMIN — PIPERACILLIN AND TAZOBACTAM 25 GRAM(S): 4; .5 INJECTION, POWDER, LYOPHILIZED, FOR SOLUTION INTRAVENOUS at 08:58

## 2023-01-04 RX ADMIN — Medication 101.6 MILLIGRAM(S): at 21:30

## 2023-01-04 RX ADMIN — Medication 101.6 MILLIGRAM(S): at 11:20

## 2023-01-04 RX ADMIN — ENOXAPARIN SODIUM 40 MILLIGRAM(S): 100 INJECTION SUBCUTANEOUS at 11:25

## 2023-01-04 RX ADMIN — Medication 15 MILLIGRAM(S): at 11:54

## 2023-01-04 RX ADMIN — Medication 15 MILLIGRAM(S): at 20:20

## 2023-01-04 RX ADMIN — Medication 1 TABLET(S): at 18:04

## 2023-01-04 RX ADMIN — Medication 1 PACKET(S): at 18:04

## 2023-01-04 RX ADMIN — Medication 15 MILLIGRAM(S): at 05:02

## 2023-01-04 RX ADMIN — Medication 15 MILLIGRAM(S): at 16:46

## 2023-01-04 RX ADMIN — QUETIAPINE FUMARATE 12.5 MILLIGRAM(S): 200 TABLET, FILM COATED ORAL at 21:05

## 2023-01-04 RX ADMIN — GABAPENTIN 200 MILLIGRAM(S): 400 CAPSULE ORAL at 05:03

## 2023-01-04 RX ADMIN — MORPHINE SULFATE 2 MILLIGRAM(S): 50 CAPSULE, EXTENDED RELEASE ORAL at 16:46

## 2023-01-04 RX ADMIN — Medication 1 TABLET(S): at 21:04

## 2023-01-04 RX ADMIN — PIPERACILLIN AND TAZOBACTAM 25 GRAM(S): 4; .5 INJECTION, POWDER, LYOPHILIZED, FOR SOLUTION INTRAVENOUS at 00:42

## 2023-01-04 RX ADMIN — Medication 100 MICROGRAM(S): at 05:03

## 2023-01-04 NOTE — PATIENT PROFILE ADULT - NSTRANSFERBELONGINGSDISPO_GEN_A_NUR
Pt notified via phone of results and plan per provider note below as written.  RN reviewed 6/12/20 antibiotic order with pt.  RN advised if she develops new or worsening sx she should be seen in urgent care, or call the main clinic number and follow the prompts to be connected with [facility] available 24-hrs daily when calling outside regular clinic hours for advice.  Pt indicates understanding of issues and agrees with the plan.  JERARDO FranklinN, RN       not applicable

## 2023-01-04 NOTE — PROGRESS NOTE ADULT - PROBLEM SELECTOR PLAN 1
-Cont IV abx  -Pain control  -Pt nontoxic, afebrile, WBC downtrending   -Soft foods    Call ENT [phone] -Cont IV abx  -Pain control  -Pt nontoxic, afebrile, WBC downtrending   -will start decadron x3 doses for facial edema   -electrolyte repletion prn   -advance to regular diet     Call ENT [phone]

## 2023-01-04 NOTE — PATIENT PROFILE ADULT - FALL HARM RISK - HARM RISK INTERVENTIONS
Patient called in to let you know that her allergic reaction was minor  she stated after the 6th infusion she developed a rash  And she is currently weaning off steroids 
Communicate Risk of Fall with Harm to all staff/Reinforce activity limits and safety measures with patient and family/Tailored Fall Risk Interventions/Visual Cue: Yellow wristband and red socks/Bed in lowest position, wheels locked, appropriate side rails in place/Call bell, personal items and telephone in reach/Instruct patient to call for assistance before getting out of bed or chair/Non-slip footwear when patient is out of bed/[place] to call system/Physically safe environment - no spills, clutter or unnecessary equipment/Purposeful Proactive Rounding/Room/bathroom lighting operational, light cord in reach

## 2023-01-04 NOTE — PROGRESS NOTE ADULT - SUBJECTIVE AND OBJECTIVE BOX
ENT ISSUE/POD: Left mandibular abscess s/p I&D on 1/3    HPI:   Patient was seen and examined at bedside, patient saying " "      PAST MEDICAL & SURGICAL HISTORY:  Thyroid cancer  1996 with ? left partial thyroidectomy with NO post op chemotherapy or RT and in 2011 with total thyroidectomy with NO post op chemotherapy or RT  Neck mass  2022  H/O iron deficiency anemia  followed by hematology. Has received iron infusions. Last infusion in January 2021  Anxiety and depression  History of hoarseness  2022  Lumbar spinal stenosis  and bulging disc  Pain  cystic lesion anterior to the sacrum --- followed by neurologist  H/O thyroidectomy  1996 partial thyroidectomy and in 2011 total thyroidectomy  History of sleeve gastrectomy  has lost 280 pounds  Excessive vaginal bleeding  hyterectomy Jan 2021 -- received 7 units PRBC        Allergies  No Known Allergies        MEDICATIONS  (STANDING):  bupivacaine  0.5%/epinephrine 1:[id number] Injectable 30 milliLiter(s) Local Injection once  calcium carbonate 1250 mG  + Vitamin D (OsCal 500 + D) 1 Tablet(s) Oral three times a day  enoxaparin Injectable 40 milliGRAM(s) SubCutaneous every 24 hours  gabapentin 200 milliGRAM(s) Oral three times a day  influenza   Vaccine 0.5 milliLiter(s) IntraMuscular once  levothyroxine 100 MICROGram(s) Oral daily  piperacillin/tazobactam IVPB.. 3.375 Gram(s) IV Intermittent every 8 hours  potassium chloride  10 mEq/100 mL IVPB 10 milliEquivalent(s) IV Intermittent every 1 hour  potassium phosphate / sodium phosphate Powder (PHOS-NaK) 1 Packet(s) Oral two times a day  QUEtiapine 12.5 milliGRAM(s) Oral at bedtime  QUEtiapine 50 milliGRAM(s) Oral daily  sertraline 50 milliGRAM(s) Oral daily    MEDICATIONS  (PRN):  ketorolac   Injectable 15 milliGRAM(s) IV Push every 4 hours PRN Mild Pain (1 - 3)  morphine  - Injectable 2 milliGRAM(s) IV Push every 4 hours PRN Moderate Pain (4 - 6)        Vital Signs Last 24 Hrs  T(C): 36.6 (04 Jan 2023 05:35), Max: 36.8 (03 Jan 2023 10:45)  T(F): 97.8 (04 Jan 2023 05:35), Max: 98.2 (03 Jan 2023 10:45)  HR: 79 (04 Jan 2023 05:35) (60 - 79)  BP: 104/73 (04 Jan 2023 05:35) (93/59 - 112/70)  RR: 19 (04 Jan 2023 05:35) (18 - 19)  SpO2: 95% (04 Jan 2023 05:35) (92% - 100%)    Parameters below as of 04 Jan 2023 05:35  Patient On (Oxygen Delivery Method): room air        Labs                      10.3   11.79 )-----------( 390      ( 04 Jan 2023 04:45 )             31.4    01-04    136  |  99  |  9.0  ----------------------------<  80  3.6   |  28.0  |  0.89    Ca    8.1<L>      04 Jan 2023 04:45  Phos  4.3     01-04  Mg     2.2     01-04    TPro  6.3<L>  /  Alb  3.1<L>  /  TBili  0.3<L>  /  DBili  x   /  AST  24  /  ALT  10  /  AlkPhos  97  01-02   PT/INR - ( 02 Jan 2023 17:51 )   PT: 11.4 sec;   INR: 0.98 ratio         PTT - ( 02 Jan 2023 17:51 )  PTT:28.4 sec    PHYSICAL EXAM:  Gen: NAD  Skin: No rashes, bruises, or lesions  Head: Normocephalic, Atraumatic  Face: no edema, erythema, or fluctuance. Parotid glands soft without mass  Eyes: no scleral injection  Ears: Right: ear canal clear, TM intact without effusion or erythema. No evidence of any fluid drainage. No mastoid tenderness, erythema, or ear bulging            Left: ear canal clear, TM intact without effusion or erythema. No evidence of any fluid drainage. No mastoid tenderness, erythema, or ear bulging  Nose: Nares bilaterally patent, no discharge  Mouth: No Stridor / Drooling / Trismus.  Mucosa moist, tongue/uvula midline, oropharynx clear  Neck: Flat, supple, no lymphadenopathy, trachea midline, no masses  Lymphatic: No lymphadenopathy  Resp: breathing easily, no stridor  Neuro: facial nerve intact, no facial droop         ENT ISSUE/POD: Left mandibular abscess s/p I&D on 1/3    HPI:   Patient was seen and examined at bedside, patient saying "I'm so glad I got a bed." Pt endorses on left maxillary tenderness, worsened on palpation. Denies dysphagia, is tolerating diet, and requesting to advance to a regular diet. +trismus.  Denies odynophagia, coryza, cough, rhinorrhea.       PAST MEDICAL & SURGICAL HISTORY:  Thyroid cancer  1996 with ? left partial thyroidectomy with NO post op chemotherapy or RT and in 2011 with total thyroidectomy with NO post op chemotherapy or RT  Neck mass  2022  H/O iron deficiency anemia  followed by hematology. Has received iron infusions. Last infusion in January 2021  Anxiety and depression  History of hoarseness  2022  Lumbar spinal stenosis  and bulging disc  Pain  cystic lesion anterior to the sacrum --- followed by neurologist  H/O thyroidectomy  1996 partial thyroidectomy and in 2011 total thyroidectomy  History of sleeve gastrectomy  has lost 280 pounds  Excessive vaginal bleeding  hyterectomy Jan 2021 -- received 7 units PRBC        Allergies  No Known Allergies        MEDICATIONS  (STANDING):  bupivacaine  0.5%/epinephrine 1:[id number] Injectable 30 milliLiter(s) Local Injection once  calcium carbonate 1250 mG  + Vitamin D (OsCal 500 + D) 1 Tablet(s) Oral three times a day  enoxaparin Injectable 40 milliGRAM(s) SubCutaneous every 24 hours  gabapentin 200 milliGRAM(s) Oral three times a day  influenza   Vaccine 0.5 milliLiter(s) IntraMuscular once  levothyroxine 100 MICROGram(s) Oral daily  piperacillin/tazobactam IVPB.. 3.375 Gram(s) IV Intermittent every 8 hours  potassium chloride  10 mEq/100 mL IVPB 10 milliEquivalent(s) IV Intermittent every 1 hour  potassium phosphate / sodium phosphate Powder (PHOS-NaK) 1 Packet(s) Oral two times a day  QUEtiapine 12.5 milliGRAM(s) Oral at bedtime  QUEtiapine 50 milliGRAM(s) Oral daily  sertraline 50 milliGRAM(s) Oral daily    MEDICATIONS  (PRN):  ketorolac   Injectable 15 milliGRAM(s) IV Push every 4 hours PRN Mild Pain (1 - 3)  morphine  - Injectable 2 milliGRAM(s) IV Push every 4 hours PRN Moderate Pain (4 - 6)        Vital Signs Last 24 Hrs  T(C): 36.6 (04 Jan 2023 05:35), Max: 36.8 (03 Jan 2023 10:45)  T(F): 97.8 (04 Jan 2023 05:35), Max: 98.2 (03 Jan 2023 10:45)  HR: 79 (04 Jan 2023 05:35) (60 - 79)  BP: 104/73 (04 Jan 2023 05:35) (93/59 - 112/70)  RR: 19 (04 Jan 2023 05:35) (18 - 19)  SpO2: 95% (04 Jan 2023 05:35) (92% - 100%)    Parameters below as of 04 Jan 2023 05:35  Patient On (Oxygen Delivery Method): room air        Labs                      10.3   11.79 )-----------( 390      ( 04 Jan 2023 04:45 )             31.4    01-04    136  |  99  |  9.0  ----------------------------<  80  3.6   |  28.0  |  0.89    Ca    8.1<L>      04 Jan 2023 04:45  Phos  4.3     01-04  Mg     2.2     01-04    TPro  6.3<L>  /  Alb  3.1<L>  /  TBili  0.3<L>  /  DBili  x   /  AST  24  /  ALT  10  /  AlkPhos  97  01-02   PT/INR - ( 02 Jan 2023 17:51 )   PT: 11.4 sec;   INR: 0.98 ratio         PTT - ( 02 Jan 2023 17:51 )  PTT:28.4 sec    PHYSICAL EXAM:  Gen: NAD  Skin: No rashes, bruises, or lesions  Head: Normocephalic, Atraumatic  Face: left mandibular induration extending to the temple without fluctuance   Eyes: no scleral injection  Ears: external ears without erythema, tenderness.   Nose: Nares bilaterally patent, no discharge  Mouth: + trismus No Stridor, mucosa moist, tongue/uvula midline   Neck: Flat, supple, no lymphadenopathy, trachea midline, no masses  Lymphatic: No lymphadenopathy  Resp: breathing easily, no stridor  Neuro: facial nerve intact, no facial droop

## 2023-01-05 LAB
ANION GAP SERPL CALC-SCNC: 10 MMOL/L — SIGNIFICANT CHANGE UP (ref 5–17)
BUN SERPL-MCNC: 12.5 MG/DL — SIGNIFICANT CHANGE UP (ref 8–20)
CALCIUM SERPL-MCNC: 8.7 MG/DL — SIGNIFICANT CHANGE UP (ref 8.4–10.5)
CHLORIDE SERPL-SCNC: 97 MMOL/L — SIGNIFICANT CHANGE UP (ref 96–108)
CO2 SERPL-SCNC: 28 MMOL/L — SIGNIFICANT CHANGE UP (ref 22–29)
CREAT SERPL-MCNC: 1.01 MG/DL — SIGNIFICANT CHANGE UP (ref 0.5–1.3)
CULTURE RESULTS: SIGNIFICANT CHANGE UP
EGFR: 69 ML/MIN/1.73M2 — SIGNIFICANT CHANGE UP
GLUCOSE SERPL-MCNC: 143 MG/DL — HIGH (ref 70–99)
HCT VFR BLD CALC: 31.6 % — LOW (ref 34.5–45)
HGB BLD-MCNC: 10.6 G/DL — LOW (ref 11.5–15.5)
MAGNESIUM SERPL-MCNC: 2.2 MG/DL — SIGNIFICANT CHANGE UP (ref 1.8–2.6)
MCHC RBC-ENTMCNC: 33.5 GM/DL — SIGNIFICANT CHANGE UP (ref 32–36)
MCHC RBC-ENTMCNC: 36.8 PG — HIGH (ref 27–34)
MCV RBC AUTO: 109.7 FL — HIGH (ref 80–100)
PHOSPHATE SERPL-MCNC: 4.4 MG/DL — SIGNIFICANT CHANGE UP (ref 2.4–4.7)
PLATELET # BLD AUTO: 427 K/UL — HIGH (ref 150–400)
POTASSIUM SERPL-MCNC: 3.7 MMOL/L — SIGNIFICANT CHANGE UP (ref 3.5–5.3)
POTASSIUM SERPL-SCNC: 3.7 MMOL/L — SIGNIFICANT CHANGE UP (ref 3.5–5.3)
RBC # BLD: 2.88 M/UL — LOW (ref 3.8–5.2)
RBC # FLD: 12.2 % — SIGNIFICANT CHANGE UP (ref 10.3–14.5)
SODIUM SERPL-SCNC: 135 MMOL/L — SIGNIFICANT CHANGE UP (ref 135–145)
SPECIMEN SOURCE: SIGNIFICANT CHANGE UP
WBC # BLD: 11.74 K/UL — HIGH (ref 3.8–10.5)
WBC # FLD AUTO: 11.74 K/UL — HIGH (ref 3.8–10.5)

## 2023-01-05 PROCEDURE — 76536 US EXAM OF HEAD AND NECK: CPT | Mod: 26

## 2023-01-05 PROCEDURE — 99232 SBSQ HOSP IP/OBS MODERATE 35: CPT | Mod: GC

## 2023-01-05 RX ADMIN — MORPHINE SULFATE 2 MILLIGRAM(S): 50 CAPSULE, EXTENDED RELEASE ORAL at 15:48

## 2023-01-05 RX ADMIN — Medication 1 TABLET(S): at 23:14

## 2023-01-05 RX ADMIN — MORPHINE SULFATE 2 MILLIGRAM(S): 50 CAPSULE, EXTENDED RELEASE ORAL at 00:05

## 2023-01-05 RX ADMIN — MORPHINE SULFATE 2 MILLIGRAM(S): 50 CAPSULE, EXTENDED RELEASE ORAL at 11:32

## 2023-01-05 RX ADMIN — MORPHINE SULFATE 2 MILLIGRAM(S): 50 CAPSULE, EXTENDED RELEASE ORAL at 11:45

## 2023-01-05 RX ADMIN — PIPERACILLIN AND TAZOBACTAM 25 GRAM(S): 4; .5 INJECTION, POWDER, LYOPHILIZED, FOR SOLUTION INTRAVENOUS at 10:22

## 2023-01-05 RX ADMIN — SERTRALINE 50 MILLIGRAM(S): 25 TABLET, FILM COATED ORAL at 21:30

## 2023-01-05 RX ADMIN — Medication 1 PACKET(S): at 05:02

## 2023-01-05 RX ADMIN — Medication 15 MILLIGRAM(S): at 08:35

## 2023-01-05 RX ADMIN — ENOXAPARIN SODIUM 40 MILLIGRAM(S): 100 INJECTION SUBCUTANEOUS at 11:33

## 2023-01-05 RX ADMIN — Medication 1 TABLET(S): at 04:05

## 2023-01-05 RX ADMIN — Medication 100 MICROGRAM(S): at 05:02

## 2023-01-05 RX ADMIN — Medication 1 TABLET(S): at 05:02

## 2023-01-05 RX ADMIN — Medication 1 PACKET(S): at 18:05

## 2023-01-05 RX ADMIN — MORPHINE SULFATE 2 MILLIGRAM(S): 50 CAPSULE, EXTENDED RELEASE ORAL at 05:13

## 2023-01-05 RX ADMIN — PIPERACILLIN AND TAZOBACTAM 25 GRAM(S): 4; .5 INJECTION, POWDER, LYOPHILIZED, FOR SOLUTION INTRAVENOUS at 18:05

## 2023-01-05 RX ADMIN — GABAPENTIN 200 MILLIGRAM(S): 400 CAPSULE ORAL at 15:44

## 2023-01-05 RX ADMIN — Medication 15 MILLIGRAM(S): at 02:32

## 2023-01-05 RX ADMIN — Medication 1 TABLET(S): at 21:36

## 2023-01-05 RX ADMIN — GABAPENTIN 200 MILLIGRAM(S): 400 CAPSULE ORAL at 05:02

## 2023-01-05 RX ADMIN — Medication 1 TABLET(S): at 15:43

## 2023-01-05 RX ADMIN — MORPHINE SULFATE 2 MILLIGRAM(S): 50 CAPSULE, EXTENDED RELEASE ORAL at 05:01

## 2023-01-05 RX ADMIN — MORPHINE SULFATE 2 MILLIGRAM(S): 50 CAPSULE, EXTENDED RELEASE ORAL at 21:45

## 2023-01-05 RX ADMIN — Medication 15 MILLIGRAM(S): at 08:22

## 2023-01-05 RX ADMIN — Medication 1 TABLET(S): at 08:17

## 2023-01-05 RX ADMIN — GABAPENTIN 200 MILLIGRAM(S): 400 CAPSULE ORAL at 21:35

## 2023-01-05 RX ADMIN — QUETIAPINE FUMARATE 12.5 MILLIGRAM(S): 200 TABLET, FILM COATED ORAL at 21:30

## 2023-01-05 RX ADMIN — MORPHINE SULFATE 2 MILLIGRAM(S): 50 CAPSULE, EXTENDED RELEASE ORAL at 21:36

## 2023-01-05 RX ADMIN — MORPHINE SULFATE 2 MILLIGRAM(S): 50 CAPSULE, EXTENDED RELEASE ORAL at 16:11

## 2023-01-05 RX ADMIN — Medication 15 MILLIGRAM(S): at 18:05

## 2023-01-05 RX ADMIN — Medication 101.6 MILLIGRAM(S): at 05:02

## 2023-01-05 RX ADMIN — Medication 15 MILLIGRAM(S): at 02:22

## 2023-01-05 NOTE — PROGRESS NOTE ADULT - SUBJECTIVE AND OBJECTIVE BOX
ENT ISSUE/POD: L facial swelling     HPI: +L facial pain, slightly improved s/p 3 doses decadron. WBC down trending gradually. Tolerating regular diet. Culture grew strep anginosis, remains on zosyn. Electrolytes have been stable     PAST MEDICAL & SURGICAL HISTORY:  Thyroid cancer  1996 with ? left partial thyroidectomy with NO post op chemotherapy or RT and in 2011 with total thyroidectomy with NO post op chemotherapy or RT      Neck mass  2022      H/O iron deficiency anemia  followed by hematology. Has received iron infusions. Last infusion in January 2021      Anxiety and depression      History of hoarseness  2022      Lumbar spinal stenosis  and bulging disc      Pain  cystic lesion anterior to the sacrum --- followed by neurologist      H/O thyroidectomy  1996 partial thyroidectomy and in 2011 total thyroidectomy      History of sleeve gastrectomy  has lost 280 pounds      Excessive vaginal bleeding  hyterectomy Jan 2021 -- received 7 units PRBC        Allergies    No Known Allergies    Intolerances      MEDICATIONS  (STANDING):  bupivacaine  0.5%/epinephrine [id number] Injectable 30 milliLiter(s) Local Injection once  calcium carbonate 1250 mG  + Vitamin D (OsCal 500 + D) 1 Tablet(s) Oral three times a day  enoxaparin Injectable 40 milliGRAM(s) SubCutaneous every 24 hours  gabapentin 200 milliGRAM(s) Oral three times a day  influenza   Vaccine 0.5 milliLiter(s) IntraMuscular once  levothyroxine 100 MICROGram(s) Oral daily  piperacillin/tazobactam IVPB.. 3.375 Gram(s) IV Intermittent every 8 hours  potassium phosphate / sodium phosphate Powder (PHOS-NaK) 1 Packet(s) Oral two times a day  QUEtiapine 12.5 milliGRAM(s) Oral at bedtime  QUEtiapine 50 milliGRAM(s) Oral daily  sertraline 50 milliGRAM(s) Oral daily    MEDICATIONS  (PRN):  calcium carbonate    500 mG (Tums) Chewable 1 Tablet(s) Chew every 4 hours PRN Heartburn  ketorolac   Injectable 15 milliGRAM(s) IV Push every 4 hours PRN Mild Pain (1 - 3)  morphine  - Injectable 2 milliGRAM(s) IV Push every 4 hours PRN Moderate Pain (4 - 6)    ROS:   ENT: all negative except as noted in HPI   Pulm: denies SOB, cough, hemoptysis  Neuro: denies numbness/tingling, loss of sensation  Endo: denies heat/cold intolerance, excessive sweating      Vital Signs Last 24 Hrs  T(C): 36.7 (05 Jan 2023 04:37), Max: 36.7 (05 Jan 2023 04:37)  T(F): 98.1 (05 Jan 2023 04:37), Max: 98.1 (05 Jan 2023 04:37)  HR: 68 (05 Jan 2023 04:37) (68 - 80)  BP: 119/79 (05 Jan 2023 04:37) (107/71 - 119/79)  BP(mean): --  RR: 18 (05 Jan 2023 04:37) (18 - 19)  SpO2: 98% (05 Jan 2023 04:37) (93% - 98%)    Parameters below as of 05 Jan 2023 04:37  Patient On (Oxygen Delivery Method): room air                              10.6   11.74 )-----------( 427      ( 05 Jan 2023 05:10 )             31.6    01-05    135  |  97  |  12.5  ----------------------------<  143<H>  3.7   |  28.0  |  1.01    Ca    8.7      05 Jan 2023 05:10  Phos  4.4     01-05  Mg     2.2     01-05         PHYSICAL EXAM:  Gen: NAD  Skin: No rashes, bruises, or lesions  Head: Normocephalic, Atraumatic  Face: Slightly improved left cheek swelling, remains slightly indurated  Eyes: no scleral injection  Ears: external ears without erythema, tenderness.   Nose: Nares bilaterally patent, no discharge  Mouth: + trismus No Stridor, mucosa moist, tongue/uvula midline   Neck: Flat, supple, no lymphadenopathy, trachea midline, no masses  Lymphatic: No lymphadenopathy  Resp: breathing easily, no stridor  Neuro: facial nerve intact, no facial droop

## 2023-01-05 NOTE — PROGRESS NOTE ADULT - NS ATTEND AMEND GEN_ALL_CORE FT
Pt with persistent L facial and temporal swelling.      Will obtain f/u sonogram.  If abscess is recollecting we will need to consider formal I & D.

## 2023-01-05 NOTE — PROGRESS NOTE ADULT - PROBLEM SELECTOR PLAN 1
Chief complaint:   Chief Complaint   Patient presents with   • Vaginal Pain     4       Vitals:  Visit Vitals  /72   Pulse 98   Temp 98.8 °F (37.1 °C) (Oral)   Resp 14   Ht 5' 1\" (1.549 m)   Wt 74.4 kg   LMP 04/29/2019 (Approximate)   SpO2 98%   BMI 30.99 kg/m²       HISTORY OF PRESENT ILLNESS     Simi Akins is a 32 year old female presenting with dysuria, vaginal pain and vaginal discharge. Symptoms have been present for about a week. She reports dysuria as well as pain externally when wiping. She describes a burning sensation around the introitus of the vagina. She reports a brownish vaginal discharge. She was seen here 3 days ago for the same symptoms. She had a wet mount that showed clue cells and was started on metronidazole 500 mg b.i.d. for BV. Wet mount was otherwise negative. Gonorrhea and chlamydia cultures were negative. Urine culture showed mixed bacteria but no obvious sign of UTI. She is sexually active with one partner. They do not use condoms. She has no history of STDs in the past. Other than the prescribed metronidazole she has not used anything to treat her symptoms. She denies abdominal pain, back pain, fevers, nausea or vomiting. Her last menstrual period was slightly over 4 weeks ago and was 4/21/19. She has not concerned that she is pregnant because she has had a tubal ligation.         Other significant problems:  Patient Active Problem List    Diagnosis Date Noted   • Segmental dysfunction of thoracic region 12/19/2018     Priority: Medium   • Pain of cervical facet joint 12/19/2018     Priority: Low   • Lumbar facet joint pain 12/19/2018     Priority: Low   • SI (sacroiliac) joint dysfunction 12/19/2018     Priority: Low   • Obesity (BMI 30-39.9) 09/25/2018     Priority: Low   • S/P laparoscopic cholecystectomy 09/25/2018     Priority: Low   • High risk medication use,phentermine 09/25/2018     Priority: Low   • Abnormal LFTs, alt 77, ast 40, july 2018 and per pt liver usg  normal, done by dr dimitris haynes, nutritionist 09/25/2018     Priority: Low   • Vitamin D deficiency, levels 18, on weekly vit d 50,000 units x 8 weeks now 09/25/2018     Priority: Low   • S/P appendectomy 09/25/2018     Priority: Low   • Memory loss, TSH per pt has been checked 2 mths ago and normal 09/25/2018     Priority: Low   • Lumbosacral spondylosis without myelopathy 09/06/2018     Priority: Low   • Discogenic low back pain 09/05/2018     Priority: Low   • Strain of thoracic region 09/05/2018     Priority: Low   • Segmental dysfunction of sacral region 09/05/2018     Priority: Low       PAST MEDICAL, FAMILY AND SOCIAL HISTORY     Medications:  Current Outpatient Medications   Medication   • HYDROcodone-acetaminophen (NORCO) 5-325 MG per tablet   • topiramate (TOPAMAX) 25 MG tablet   • phentermine 37.5 MG capsule   • metroNIDAZOLE (FLAGYL) 500 MG tablet   • traMADol (ULTRAM) 50 MG tablet   • ketoconazole (NIZORAL) 2 % shampoo   • topiramate (TOPAMAX) 25 MG tablet   • Cholecalciferol (VITAMIN D3) 33264 units Tab   • metroNIDAZOLE (METROGEL-VAGINAL) 0.75 % vaginal gel   • venlafaxine XR (EFFEXOR XR) 37.5 MG 24 hr capsule   • diclofenac (VOLTAREN) 75 MG EC tablet     No current facility-administered medications for this visit.        Allergies:  ALLERGIES:   Allergen Reactions   • Gabapentin Other (See Comments)     Weight gain   • Lyrica Other (See Comments)     Weight gain       Past Medical  History/Surgeries:  Past Medical History:   Diagnosis Date   • Lumbar spondylosis    • Obesity (BMI 30-39.9)        Past Surgical History:   Procedure Laterality Date   • Appendectomy     • Cholecystectomy         Family History:  Family History   Problem Relation Age of Onset   • Diabetes Mother    • Hypertension Mother    • Cancer Brother         testicular       Social History:  Social History     Tobacco Use   • Smoking status: Former Smoker     Packs/day: 0.25     Types: Cigarettes   • Smokeless tobacco: Never  Used   Substance Use Topics   • Alcohol use: Yes     Comment: rare       REVIEW OF SYSTEMS     Review of Systems   Constitutional: Negative for chills and fever.   Gastrointestinal: Negative for abdominal pain, nausea and vomiting.   Genitourinary: Positive for dysuria, hematuria (\"pink tinge\" on toilet paper, no gross hematuria), vaginal discharge (thin white) and vaginal pain (burning at vaginal introitus). Negative for flank pain, frequency, genital sores and vaginal bleeding.   Musculoskeletal: Negative for arthralgias and back pain.   Skin: Negative for rash and wound.       PHYSICAL EXAM     Physical Exam   Constitutional: She appears well-developed and well-nourished. No distress.   HENT:   Head: Normocephalic and atraumatic.   Right Ear: Hearing, tympanic membrane and ear canal normal.   Left Ear: Hearing, tympanic membrane and ear canal normal.   Nose: No mucosal edema or rhinorrhea. Right sinus exhibits no maxillary sinus tenderness and no frontal sinus tenderness. Left sinus exhibits no maxillary sinus tenderness and no frontal sinus tenderness.   Mouth/Throat: Mucous membranes are normal. No trismus in the jaw. No oropharyngeal exudate, posterior oropharyngeal edema or posterior oropharyngeal erythema.   Eyes: Conjunctivae and EOM are normal. Right eye exhibits no discharge. Left eye exhibits no discharge.   Neck: Normal range of motion. Neck supple.   Cardiovascular: Normal rate, regular rhythm, normal heart sounds and intact distal pulses.   No murmur heard.  Pulmonary/Chest: Effort normal and breath sounds normal. No respiratory distress. She has no wheezes. She has no rales.   Genitourinary: Uterus normal. There is no rash, tenderness, lesion or injury on the right labia. There is no rash, tenderness or lesion on the left labia. Uterus is not tender. Cervix exhibits no motion tenderness, no discharge and no friability. Right adnexum displays no mass, no tenderness and no fullness. Left adnexum  displays no mass, no tenderness and no fullness. There is erythema (at introitus, no focal lesions) and tenderness (tender around introitus, worse at inferior aspect) in the vagina. No bleeding in the vagina. No foreign body in the vagina. No signs of injury around the vagina. Vaginal discharge (thin and white) found.   Neurological: She is alert.   Skin: Skin is warm and dry. No rash noted. No erythema.   Psychiatric: She has a normal mood and affect.   Nursing note and vitals reviewed.      ASSESSMENT/PLAN       Simi was seen today for vaginal pain.    Diagnoses and all orders for this visit:    BV (bacterial vaginosis)    Vaginal pain  -     URINALYSIS & REFLEX MICRO  -     PREGNANCY TEST URINE  -     WET MOUNT  -     VAGINAL PATHOGENS  -     HERPES SIMPLEX BY PCR  -     URINE CULTURE    Other orders  -     metroNIDAZOLE (METROGEL-VAGINAL) 0.75 % vaginal gel; Place 1 applicator vaginally at bedtime for 7 days.      Results for orders placed or performed in visit on 05/24/19   URINALYSIS & REFLEX MICRO   Result Value    SPECIMEN TYPE urine    COLOR yellow    APPEARANCE clear    SPECIFIC GRAVITY 1.015    pH 7.0    PROTEIN(URINE) neg    GLUCOSE(URINE) neg    KETONES neg    BILIRUBIN neg    BLOOD mod    NITRITE neg    UROBILINOGEN 0.2    LEUKOCYTE ESTERASE neg    REMARKS    PREGNANCY TEST URINE   Result Value    SPECIFIC GRAVITY     MONOCLONAL PREGNANCY Negative   WET MOUNT   Result Value    YEAST NONE SEEN    Trichomonas NONE SEEN    Clue Cells PRESENT (A)     -Reviewed urgent care note and labs from patient's prior visit for same complaints 3 days ago on 5/21/19. Wet mount was positive only for clues cells and pt has been taking metronidazole bid without relief. Gonorrhea and chlamydia cultures were negative. Urine culture showed 10-50,000 colonies of mixed bacteria. Urine dip today shows moderate blood but no LE or nitrite. preg test is negative today.  Wet mount still shows clue cells. Pt has taken 4 full days  of metronidazole tablets already ( 8 doses) without improvement I will change her to MetroGel q.h.s. ×7 days. She did have significant discomfort on vaginal exam which was out of proportion to physical exam findings. Just to light touch with cotton swab at the introitus, she exhibits significant pain. No focal ulcerated lesions or vesicular lesions are noted but there was some slight erythema and erosion at the vaginal introitus. I did swab this for HSV. Patient will be called with that result. Pt advised to f/u with gynecology if symptoms do not improve with this course of metronidazole.   -Instructions per AVS.  -Pt was understanding of all instructions and had no questions at the time of discharge.   -Supervising physician was Dr. Rock Barragan.         Enid Renee PA-C  5/24/2019  8:13 PM       -S/p needle aspiration, 30 ccs expressed. [place] +strep anginosus  -Cont on IV abx  -Pain control  -Diet as tolerated

## 2023-01-06 ENCOUNTER — TRANSCRIPTION ENCOUNTER (OUTPATIENT)
Age: 48
End: 2023-01-06

## 2023-01-06 LAB
ANION GAP SERPL CALC-SCNC: 9 MMOL/L — SIGNIFICANT CHANGE UP (ref 5–17)
BUN SERPL-MCNC: 13 MG/DL — SIGNIFICANT CHANGE UP (ref 8–20)
CALCIUM SERPL-MCNC: 8.2 MG/DL — LOW (ref 8.4–10.5)
CHLORIDE SERPL-SCNC: 98 MMOL/L — SIGNIFICANT CHANGE UP (ref 96–108)
CO2 SERPL-SCNC: 29 MMOL/L — SIGNIFICANT CHANGE UP (ref 22–29)
CREAT SERPL-MCNC: 0.98 MG/DL — SIGNIFICANT CHANGE UP (ref 0.5–1.3)
EGFR: 71 ML/MIN/1.73M2 — SIGNIFICANT CHANGE UP
GLUCOSE SERPL-MCNC: 87 MG/DL — SIGNIFICANT CHANGE UP (ref 70–99)
HCT VFR BLD CALC: 28.5 % — LOW (ref 34.5–45)
HGB BLD-MCNC: 9.5 G/DL — LOW (ref 11.5–15.5)
MAGNESIUM SERPL-MCNC: 1.9 MG/DL — SIGNIFICANT CHANGE UP (ref 1.6–2.6)
MCHC RBC-ENTMCNC: 33.3 GM/DL — SIGNIFICANT CHANGE UP (ref 32–36)
MCHC RBC-ENTMCNC: 37 PG — HIGH (ref 27–34)
MCV RBC AUTO: 110.9 FL — HIGH (ref 80–100)
PHOSPHATE SERPL-MCNC: 4.8 MG/DL — HIGH (ref 2.4–4.7)
PLATELET # BLD AUTO: 375 K/UL — SIGNIFICANT CHANGE UP (ref 150–400)
POTASSIUM SERPL-MCNC: 3.2 MMOL/L — LOW (ref 3.5–5.3)
POTASSIUM SERPL-SCNC: 3.2 MMOL/L — LOW (ref 3.5–5.3)
RBC # BLD: 2.57 M/UL — LOW (ref 3.8–5.2)
RBC # FLD: 12.1 % — SIGNIFICANT CHANGE UP (ref 10.3–14.5)
SODIUM SERPL-SCNC: 136 MMOL/L — SIGNIFICANT CHANGE UP (ref 135–145)
WBC # BLD: 7.42 K/UL — SIGNIFICANT CHANGE UP (ref 3.8–10.5)
WBC # FLD AUTO: 7.42 K/UL — SIGNIFICANT CHANGE UP (ref 3.8–10.5)

## 2023-01-06 PROCEDURE — 99232 SBSQ HOSP IP/OBS MODERATE 35: CPT | Mod: 24

## 2023-01-06 RX ORDER — POTASSIUM CHLORIDE 20 MEQ
40 PACKET (EA) ORAL
Refills: 0 | Status: COMPLETED | OUTPATIENT
Start: 2023-01-06 | End: 2023-01-06

## 2023-01-06 RX ORDER — MORPHINE SULFATE 50 MG/1
15 CAPSULE, EXTENDED RELEASE ORAL EVERY 6 HOURS
Refills: 0 | Status: DISCONTINUED | OUTPATIENT
Start: 2023-01-06 | End: 2023-01-07

## 2023-01-06 RX ORDER — IBUPROFEN 200 MG
600 TABLET ORAL EVERY 6 HOURS
Refills: 0 | Status: DISCONTINUED | OUTPATIENT
Start: 2023-01-06 | End: 2023-01-07

## 2023-01-06 RX ORDER — ACETAMINOPHEN 500 MG
650 TABLET ORAL EVERY 6 HOURS
Refills: 0 | Status: DISCONTINUED | OUTPATIENT
Start: 2023-01-06 | End: 2023-01-07

## 2023-01-06 RX ORDER — KETOROLAC TROMETHAMINE 30 MG/ML
15 SYRINGE (ML) INJECTION EVERY 6 HOURS
Refills: 0 | Status: DISCONTINUED | OUTPATIENT
Start: 2023-01-06 | End: 2023-01-07

## 2023-01-06 RX ORDER — OXYCODONE HYDROCHLORIDE 5 MG/1
5 TABLET ORAL EVERY 6 HOURS
Refills: 0 | Status: DISCONTINUED | OUTPATIENT
Start: 2023-01-06 | End: 2023-01-06

## 2023-01-06 RX ORDER — OXYCODONE HYDROCHLORIDE 5 MG/1
1 TABLET ORAL
Qty: 4 | Refills: 0
Start: 2023-01-06

## 2023-01-06 RX ADMIN — OXYCODONE HYDROCHLORIDE 5 MILLIGRAM(S): 5 TABLET ORAL at 11:31

## 2023-01-06 RX ADMIN — MORPHINE SULFATE 2 MILLIGRAM(S): 50 CAPSULE, EXTENDED RELEASE ORAL at 06:30

## 2023-01-06 RX ADMIN — QUETIAPINE FUMARATE 12.5 MILLIGRAM(S): 200 TABLET, FILM COATED ORAL at 21:35

## 2023-01-06 RX ADMIN — PIPERACILLIN AND TAZOBACTAM 25 GRAM(S): 4; .5 INJECTION, POWDER, LYOPHILIZED, FOR SOLUTION INTRAVENOUS at 02:03

## 2023-01-06 RX ADMIN — Medication 100 MICROGRAM(S): at 06:20

## 2023-01-06 RX ADMIN — Medication 1 TABLET(S): at 06:18

## 2023-01-06 RX ADMIN — QUETIAPINE FUMARATE 50 MILLIGRAM(S): 200 TABLET, FILM COATED ORAL at 21:43

## 2023-01-06 RX ADMIN — ENOXAPARIN SODIUM 40 MILLIGRAM(S): 100 INJECTION SUBCUTANEOUS at 11:30

## 2023-01-06 RX ADMIN — GABAPENTIN 200 MILLIGRAM(S): 400 CAPSULE ORAL at 21:35

## 2023-01-06 RX ADMIN — MORPHINE SULFATE 15 MILLIGRAM(S): 50 CAPSULE, EXTENDED RELEASE ORAL at 15:20

## 2023-01-06 RX ADMIN — GABAPENTIN 200 MILLIGRAM(S): 400 CAPSULE ORAL at 06:18

## 2023-01-06 RX ADMIN — GABAPENTIN 200 MILLIGRAM(S): 400 CAPSULE ORAL at 13:58

## 2023-01-06 RX ADMIN — MORPHINE SULFATE 15 MILLIGRAM(S): 50 CAPSULE, EXTENDED RELEASE ORAL at 21:35

## 2023-01-06 RX ADMIN — SERTRALINE 50 MILLIGRAM(S): 25 TABLET, FILM COATED ORAL at 21:34

## 2023-01-06 RX ADMIN — MORPHINE SULFATE 15 MILLIGRAM(S): 50 CAPSULE, EXTENDED RELEASE ORAL at 22:10

## 2023-01-06 RX ADMIN — PIPERACILLIN AND TAZOBACTAM 25 GRAM(S): 4; .5 INJECTION, POWDER, LYOPHILIZED, FOR SOLUTION INTRAVENOUS at 11:30

## 2023-01-06 RX ADMIN — Medication 1 TABLET(S): at 13:58

## 2023-01-06 RX ADMIN — Medication 40 MILLIEQUIVALENT(S): at 11:30

## 2023-01-06 RX ADMIN — MORPHINE SULFATE 2 MILLIGRAM(S): 50 CAPSULE, EXTENDED RELEASE ORAL at 02:30

## 2023-01-06 RX ADMIN — Medication 15 MILLIGRAM(S): at 17:37

## 2023-01-06 RX ADMIN — MORPHINE SULFATE 2 MILLIGRAM(S): 50 CAPSULE, EXTENDED RELEASE ORAL at 06:18

## 2023-01-06 RX ADMIN — MORPHINE SULFATE 2 MILLIGRAM(S): 50 CAPSULE, EXTENDED RELEASE ORAL at 02:15

## 2023-01-06 RX ADMIN — Medication 15 MILLIGRAM(S): at 18:00

## 2023-01-06 RX ADMIN — OXYCODONE HYDROCHLORIDE 5 MILLIGRAM(S): 5 TABLET ORAL at 12:30

## 2023-01-06 RX ADMIN — MORPHINE SULFATE 15 MILLIGRAM(S): 50 CAPSULE, EXTENDED RELEASE ORAL at 14:22

## 2023-01-06 RX ADMIN — Medication 1 TABLET(S): at 21:34

## 2023-01-06 RX ADMIN — PIPERACILLIN AND TAZOBACTAM 25 GRAM(S): 4; .5 INJECTION, POWDER, LYOPHILIZED, FOR SOLUTION INTRAVENOUS at 18:20

## 2023-01-06 NOTE — PROGRESS NOTE ADULT - SUBJECTIVE AND OBJECTIVE BOX
ENT ISSUE/POD: facial abscess    HPI: Pt feel ok this am, increased discomfort since US probe last night but overall improving. Tolerating regular diet.       PAST MEDICAL & SURGICAL HISTORY:  Thyroid cancer  1996 with ? left partial thyroidectomy with NO post op chemotherapy or RT and in 2011 with total thyroidectomy with NO post op chemotherapy or RT      Neck mass  2022      H/O iron deficiency anemia  followed by hematology. Has received iron infusions. Last infusion in January 2021      Anxiety and depression      History of hoarseness  2022      Lumbar spinal stenosis  and bulging disc      Pain  cystic lesion anterior to the sacrum --- followed by neurologist      H/O thyroidectomy  1996 partial thyroidectomy and in 2011 total thyroidectomy      History of sleeve gastrectomy  has lost 280 pounds      Excessive vaginal bleeding  hyterectomy Jan 2021 -- received 7 units PRBC        Allergies    No Known Allergies    Intolerances      MEDICATIONS  (STANDING):  bupivacaine  0.5%/epinephrine [id number] Injectable 30 milliLiter(s) Local Injection once  calcium carbonate 1250 mG  + Vitamin D (OsCal 500 + D) 1 Tablet(s) Oral three times a day  enoxaparin Injectable 40 milliGRAM(s) SubCutaneous every 24 hours  gabapentin 200 milliGRAM(s) Oral three times a day  influenza   Vaccine 0.5 milliLiter(s) IntraMuscular once  levothyroxine 100 MICROGram(s) Oral daily  piperacillin/tazobactam IVPB.. 3.375 Gram(s) IV Intermittent every 8 hours  potassium phosphate / sodium phosphate Powder (PHOS-NaK) 1 Packet(s) Oral two times a day  QUEtiapine 12.5 milliGRAM(s) Oral at bedtime  QUEtiapine 50 milliGRAM(s) Oral daily  sertraline 50 milliGRAM(s) Oral daily    MEDICATIONS  (PRN):  calcium carbonate    500 mG (Tums) Chewable 1 Tablet(s) Chew every 4 hours PRN Heartburn  ketorolac   Injectable 15 milliGRAM(s) IV Push every 4 hours PRN Mild Pain (1 - 3)  morphine  - Injectable 2 milliGRAM(s) IV Push every 4 hours PRN Moderate Pain (4 - 6)      ROS:   ENT: all negative except as noted in HPI   Pulm: denies SOB, cough, hemoptysis  Neuro: denies numbness/tingling, loss of sensation  Endo: denies heat/cold intolerance, excessive sweating      Vital Signs Last 24 Hrs  T(C): 36.3 (06 Jan 2023 04:43), Max: 36.7 (05 Jan 2023 19:20)  T(F): 97.4 (06 Jan 2023 04:43), Max: 98.1 (05 Jan 2023 19:20)  HR: 55 (06 Jan 2023 04:43) (55 - 69)  BP: 95/64 (06 Jan 2023 04:43) (95/64 - 130/80)  BP(mean): --  RR: 18 (06 Jan 2023 04:43) (18 - 18)  SpO2: 93% (06 Jan 2023 04:43) (93% - 99%)    Parameters below as of 06 Jan 2023 04:43  Patient On (Oxygen Delivery Method): room air                              9.5    7.42  )-----------( 375      ( 06 Jan 2023 05:34 )             28.5    01-06    136  |  98  |  13.0  ----------------------------<  87  3.2<L>   |  29.0  |  0.98    Ca    8.2<L>      06 Jan 2023 05:34  Phos  4.8     01-06  Mg     1.9     01-06         PHYSICAL EXAM:  Gen: NAD  Skin: No rashes, bruises, or lesions  Head: Normocephalic, Atraumatic  Face: Improving left cheek swelling, remains slightly indurated  Eyes: no scleral injection  Ears: external ears without erythema, tenderness.   Nose: Nares bilaterally patent, no discharge  Mouth: + trismus No Stridor, mucosa moist, tongue/uvula midline   Neck: Flat, supple, no lymphadenopathy, trachea midline, no masses  Lymphatic: No lymphadenopathy  Resp: breathing easily, no stridor  Neuro: facial nerve intact, no facial droop    US reviewed with Dr. John, small 1.3 x .9 x .7 cm collection amidst soft tissue edema

## 2023-01-06 NOTE — DISCHARGE NOTE PROVIDER - HOSPITAL COURSE
Pt presented with facial swelling to the emergency department. A needle was used to aspirate the abscess of the L cheek which 30 ccs of rachel pus was withdrew. Pt treated with 4 days of IV zosyn and 3 doses of IV decadron. WBC trended from 13.5 to 7 and remained afebrile. Potassium was repleted on 3 occasions. Pt also started on calcium and vit D supplementation. Pt w poor periodontal disease which is the cause of her facial swelling. Once patient educated on the cause of her acute issue, she mentions having a relationship with [facility] and will follow with them immediately for extractions/comprehensive dental care

## 2023-01-06 NOTE — DISCHARGE NOTE PROVIDER - CARE PROVIDER_API CALL
Pato John)  Otolaryngology  [address] [place] [address]  Phone: [phone]  Fax: [phone]  Follow Up Time:

## 2023-01-06 NOTE — PROGRESS NOTE ADULT - PROBLEM SELECTOR PLAN 1
-Plan to transition IV to PO meds, will go home on oral abx   -Plan for DC tomorrow am  -Cont regular diet as tolerated  -Cont pain control  -Replete electrolytes as necessary

## 2023-01-07 ENCOUNTER — TRANSCRIPTION ENCOUNTER (OUTPATIENT)
Age: 48
End: 2023-01-07

## 2023-01-07 VITALS
OXYGEN SATURATION: 94 % | HEART RATE: 59 BPM | DIASTOLIC BLOOD PRESSURE: 66 MMHG | SYSTOLIC BLOOD PRESSURE: 105 MMHG | TEMPERATURE: 97 F | RESPIRATION RATE: 18 BRPM

## 2023-01-07 LAB
ANION GAP SERPL CALC-SCNC: 9 MMOL/L — SIGNIFICANT CHANGE UP (ref 5–17)
BUN SERPL-MCNC: 15.9 MG/DL — SIGNIFICANT CHANGE UP (ref 8–20)
CALCIUM SERPL-MCNC: 8.3 MG/DL — LOW (ref 8.4–10.5)
CHLORIDE SERPL-SCNC: 101 MMOL/L — SIGNIFICANT CHANGE UP (ref 96–108)
CO2 SERPL-SCNC: 29 MMOL/L — SIGNIFICANT CHANGE UP (ref 22–29)
CREAT SERPL-MCNC: 1.1 MG/DL — SIGNIFICANT CHANGE UP (ref 0.5–1.3)
EGFR: 62 ML/MIN/1.73M2 — SIGNIFICANT CHANGE UP
GLUCOSE SERPL-MCNC: 82 MG/DL — SIGNIFICANT CHANGE UP (ref 70–99)
HCT VFR BLD CALC: 29.8 % — LOW (ref 34.5–45)
HGB BLD-MCNC: 9.7 G/DL — LOW (ref 11.5–15.5)
MCHC RBC-ENTMCNC: 32.6 GM/DL — SIGNIFICANT CHANGE UP (ref 32–36)
MCHC RBC-ENTMCNC: 36.5 PG — HIGH (ref 27–34)
MCV RBC AUTO: 112 FL — HIGH (ref 80–100)
PLATELET # BLD AUTO: 347 K/UL — SIGNIFICANT CHANGE UP (ref 150–400)
POTASSIUM SERPL-MCNC: 4.2 MMOL/L — SIGNIFICANT CHANGE UP (ref 3.5–5.3)
POTASSIUM SERPL-SCNC: 4.2 MMOL/L — SIGNIFICANT CHANGE UP (ref 3.5–5.3)
RBC # BLD: 2.66 M/UL — LOW (ref 3.8–5.2)
RBC # FLD: 12.2 % — SIGNIFICANT CHANGE UP (ref 10.3–14.5)
SODIUM SERPL-SCNC: 139 MMOL/L — SIGNIFICANT CHANGE UP (ref 135–145)
WBC # BLD: 6.66 K/UL — SIGNIFICANT CHANGE UP (ref 3.8–10.5)
WBC # FLD AUTO: 6.66 K/UL — SIGNIFICANT CHANGE UP (ref 3.8–10.5)

## 2023-01-07 RX ORDER — OXYCODONE HYDROCHLORIDE 5 MG/1
5 TABLET ORAL EVERY 4 HOURS
Refills: 0 | Status: DISCONTINUED | OUTPATIENT
Start: 2023-01-07 | End: 2023-01-07

## 2023-01-07 RX ADMIN — PIPERACILLIN AND TAZOBACTAM 25 GRAM(S): 4; .5 INJECTION, POWDER, LYOPHILIZED, FOR SOLUTION INTRAVENOUS at 12:05

## 2023-01-07 RX ADMIN — ENOXAPARIN SODIUM 40 MILLIGRAM(S): 100 INJECTION SUBCUTANEOUS at 12:04

## 2023-01-07 RX ADMIN — Medication 15 MILLIGRAM(S): at 08:30

## 2023-01-07 RX ADMIN — Medication 1 TABLET(S): at 05:10

## 2023-01-07 RX ADMIN — MORPHINE SULFATE 15 MILLIGRAM(S): 50 CAPSULE, EXTENDED RELEASE ORAL at 06:00

## 2023-01-07 RX ADMIN — GABAPENTIN 200 MILLIGRAM(S): 400 CAPSULE ORAL at 05:09

## 2023-01-07 RX ADMIN — PIPERACILLIN AND TAZOBACTAM 25 GRAM(S): 4; .5 INJECTION, POWDER, LYOPHILIZED, FOR SOLUTION INTRAVENOUS at 03:03

## 2023-01-07 RX ADMIN — Medication 15 MILLIGRAM(S): at 08:07

## 2023-01-07 RX ADMIN — MORPHINE SULFATE 15 MILLIGRAM(S): 50 CAPSULE, EXTENDED RELEASE ORAL at 13:00

## 2023-01-07 RX ADMIN — MORPHINE SULFATE 15 MILLIGRAM(S): 50 CAPSULE, EXTENDED RELEASE ORAL at 12:04

## 2023-01-07 RX ADMIN — Medication 100 MICROGRAM(S): at 05:10

## 2023-01-07 RX ADMIN — MORPHINE SULFATE 15 MILLIGRAM(S): 50 CAPSULE, EXTENDED RELEASE ORAL at 05:10

## 2023-01-07 NOTE — PROGRESS NOTE ADULT - ASSESSMENT
46 yo F w L mandibular swelling s/p 30 ccs expressed 1/3 am, culture grew strep anginosus. Exam remains with induration, slightly improved since decadron, no fluctuance. On IV zosyn     US small 1.3 x .9 x .7 cm collection amidst soft tissue edema NO further plans for surgical intervention    WBC has normalized, has been afebrile      Problem/Plan - 1:  ·  Problem: Facial abscess.   ·  Plan: -Plan to transition IV to PO meds, will go home on oral abx   -Plan for DC today am  -Cont regular diet as tolerated  -Cont pain control  -Replete electrolytes as necessary.
46 yo F w L mandibular swelling s/p 30 ccs expressed from aspiration this am. Exam remains with induration, no fluctuance. On IV zosyn 
48 yo F w L mandibular swelling s/p 30 ccs expressed 1/3 am, culture grew strep anginosus. Exam remains with induration, slightly improved since decadron, no fluctuance. On IV zosyn 
48 yo F w L mandibular swelling s/p 30 ccs expressed 1/3 am, culture grew strep anginosus. Exam remains with induration, slightly improved since decadron, no fluctuance. On IV zosyn     US small 1.3 x .9 x .7 cm collection amidst soft tissue edema NO further plans for surgical intervention    WBC has normalized, has been afebrile 
48 yo F w L mandibular swelling s/p 30 ccs expressed from aspiration this am. Exam remains with induration, no fluctuance. On IV zosyn

## 2023-01-07 NOTE — PROGRESS NOTE ADULT - REASON FOR ADMISSION
Left mandibular abscess

## 2023-01-07 NOTE — DISCHARGE NOTE NURSING/CASE MANAGEMENT/SOCIAL WORK - PATIENT PORTAL LINK FT
You can access the FollowMyHealth Patient Portal offered by [facility] by registering at the following website: [email or web address]. By joining [facility] FollowMyHealth portal, you will also be able to view your health information using other applications (apps) compatible with our system.

## 2023-01-07 NOTE — DISCHARGE NOTE NURSING/CASE MANAGEMENT/SOCIAL WORK - NSDCPEFALRISK_GEN_ALL_CORE
For information on Fall & Injury Prevention, visit: [email or web address] OR  [email or web address] OR  https://www.cdc.gov/steadi/patient.html

## 2023-01-07 NOTE — PROGRESS NOTE ADULT - SUBJECTIVE AND OBJECTIVE BOX
INTERVAL HPI/OVERNIGHT EVENTS:    Patient evaluated at bedside. No acute distress. No acute events overnight. Pain continues to improve. Afebrile overnight.     MEDICATIONS  (STANDING):  bupivacaine  0.5%/epinephrine [id number] Injectable 30 milliLiter(s) Local Injection once  calcium carbonate 1250 mG  + Vitamin D (OsCal 500 + D) 1 Tablet(s) Oral three times a day  enoxaparin Injectable 40 milliGRAM(s) SubCutaneous every 24 hours  gabapentin 200 milliGRAM(s) Oral three times a day  influenza   Vaccine 0.5 milliLiter(s) IntraMuscular once  levothyroxine 100 MICROGram(s) Oral daily  piperacillin/tazobactam IVPB.. 3.375 Gram(s) IV Intermittent every 8 hours  QUEtiapine 50 milliGRAM(s) Oral daily  QUEtiapine 12.5 milliGRAM(s) Oral at bedtime  sertraline 50 milliGRAM(s) Oral daily    MEDICATIONS  (PRN):  acetaminophen     Tablet .. 650 milliGRAM(s) Oral every 6 hours PRN Mild Pain (1 - 3)  ibuprofen  Tablet. 600 milliGRAM(s) Oral every 6 hours PRN Moderate Pain (4 - 6)  ketorolac   Injectable 15 milliGRAM(s) IV Push every 6 hours PRN break through pain  morphine  IR 15 milliGRAM(s) Oral every 6 hours PRN Severe Pain (7 - 10)      Vital Signs Last 24 Hrs  T(C): 36.4 (06 Jan 2023 19:10), Max: 36.4 (06 Jan 2023 10:37)  T(F): 97.5 (06 Jan 2023 19:10), Max: 97.5 (06 Jan 2023 10:37)  HR: 64 (06 Jan 2023 19:10) (55 - 66)  BP: 103/69 (06 Jan 2023 19:10) (95/64 - 111/76)  BP(mean): --  RR: 18 (06 Jan 2023 19:10) (18 - 18)  SpO2: 95% (06 Jan 2023 19:10) (93% - 99%)    Parameters below as of 06 Jan 2023 19:10  Patient On (Oxygen Delivery Method): room air      PHYSICAL EXAM:  Gen: NAD  Skin: No rashes, bruises, or lesions  Head: Normocephalic, Atraumatic  Face: Improving left cheek swelling, remains slightly indurated  Eyes: no scleral injection  Ears: external ears without erythema, tenderness.   Nose: Nares bilaterally patent, no discharge  Mouth: + trismus No Stridor, mucosa moist, tongue/uvula midline   Neck: Flat, supple, no lymphadenopathy, trachea midline, no masses  Lymphatic: No lymphadenopathy  Resp: breathing easily, no stridor  Neuro: facial nerve intact, no facial droop        I&O's Detail      LABS:                        9.5    7.42  )-----------( 375      ( 06 Jan 2023 05:34 )             28.5     01-06    136  |  98  |  13.0  ----------------------------<  87  3.2<L>   |  29.0  |  0.98    Ca    8.2<L>      06 Jan 2023 05:34  Phos  4.8     01-06  Mg     1.9     01-06            RADIOLOGY & ADDITIONAL STUDIES:

## 2023-01-08 LAB
CULTURE RESULTS: SIGNIFICANT CHANGE UP
CULTURE RESULTS: SIGNIFICANT CHANGE UP
SPECIMEN SOURCE: SIGNIFICANT CHANGE UP
SPECIMEN SOURCE: SIGNIFICANT CHANGE UP

## 2023-01-10 ENCOUNTER — INPATIENT (INPATIENT)
Facility: HOSPITAL | Age: 48
LOS: 21 days | Discharge: HOME | End: 2023-02-01
Attending: STUDENT IN AN ORGANIZED HEALTH CARE EDUCATION/TRAINING PROGRAM | Admitting: STUDENT IN AN ORGANIZED HEALTH CARE EDUCATION/TRAINING PROGRAM
Payer: MEDICAID

## 2023-01-10 VITALS
RESPIRATION RATE: 18 BRPM | HEART RATE: 68 BPM | OXYGEN SATURATION: 100 % | DIASTOLIC BLOOD PRESSURE: 92 MMHG | SYSTOLIC BLOOD PRESSURE: 134 MMHG | TEMPERATURE: 98 F

## 2023-01-10 DIAGNOSIS — Z90.3 ACQUIRED ABSENCE OF STOMACH [PART OF]: Chronic | ICD-10-CM

## 2023-01-10 DIAGNOSIS — N93.9 ABNORMAL UTERINE AND VAGINAL BLEEDING, UNSPECIFIED: Chronic | ICD-10-CM

## 2023-01-10 DIAGNOSIS — R22.0 LOCALIZED SWELLING, MASS AND LUMP, HEAD: ICD-10-CM

## 2023-01-10 DIAGNOSIS — Z98.890 OTHER SPECIFIED POSTPROCEDURAL STATES: Chronic | ICD-10-CM

## 2023-01-10 LAB
ALBUMIN SERPL ELPH-MCNC: 3.8 G/DL — SIGNIFICANT CHANGE UP (ref 3.3–5)
ALP SERPL-CCNC: 65 U/L — SIGNIFICANT CHANGE UP (ref 40–120)
ALT FLD-CCNC: 10 U/L — SIGNIFICANT CHANGE UP (ref 4–33)
ANION GAP SERPL CALC-SCNC: 9 MMOL/L — SIGNIFICANT CHANGE UP (ref 7–14)
AST SERPL-CCNC: 22 U/L — SIGNIFICANT CHANGE UP (ref 4–32)
BASE EXCESS BLDV CALC-SCNC: 2.5 MMOL/L — SIGNIFICANT CHANGE UP (ref -2–3)
BASOPHILS # BLD AUTO: 0.08 K/UL — SIGNIFICANT CHANGE UP (ref 0–0.2)
BASOPHILS NFR BLD AUTO: 1 % — SIGNIFICANT CHANGE UP (ref 0–2)
BILIRUB SERPL-MCNC: 0.2 MG/DL — SIGNIFICANT CHANGE UP (ref 0.2–1.2)
BLOOD GAS VENOUS COMPREHENSIVE RESULT: SIGNIFICANT CHANGE UP
BUN SERPL-MCNC: 12 MG/DL — SIGNIFICANT CHANGE UP (ref 7–23)
CALCIUM SERPL-MCNC: 8.4 MG/DL — SIGNIFICANT CHANGE UP (ref 8.4–10.5)
CHLORIDE BLDV-SCNC: 100 MMOL/L — SIGNIFICANT CHANGE UP (ref 96–108)
CHLORIDE SERPL-SCNC: 100 MMOL/L — SIGNIFICANT CHANGE UP (ref 98–107)
CO2 BLDV-SCNC: 29.9 MMOL/L — HIGH (ref 22–26)
CO2 SERPL-SCNC: 27 MMOL/L — SIGNIFICANT CHANGE UP (ref 22–31)
CREAT SERPL-MCNC: 1 MG/DL — SIGNIFICANT CHANGE UP (ref 0.5–1.3)
EGFR: 69 ML/MIN/1.73M2 — SIGNIFICANT CHANGE UP
EOSINOPHIL # BLD AUTO: 0.12 K/UL — SIGNIFICANT CHANGE UP (ref 0–0.5)
EOSINOPHIL NFR BLD AUTO: 1.4 % — SIGNIFICANT CHANGE UP (ref 0–6)
GAS PNL BLDV: 133 MMOL/L — LOW (ref 136–145)
GLUCOSE BLDV-MCNC: 95 MG/DL — SIGNIFICANT CHANGE UP (ref 70–99)
GLUCOSE SERPL-MCNC: 99 MG/DL — SIGNIFICANT CHANGE UP (ref 70–99)
HCO3 BLDV-SCNC: 28 MMOL/L — SIGNIFICANT CHANGE UP (ref 22–29)
HCT VFR BLD CALC: 34.5 % — SIGNIFICANT CHANGE UP (ref 34.5–45)
HCT VFR BLDA CALC: 38 % — SIGNIFICANT CHANGE UP (ref 34.5–46.5)
HGB BLD CALC-MCNC: 12.7 G/DL — SIGNIFICANT CHANGE UP (ref 11.5–15.5)
HGB BLD-MCNC: 11.7 G/DL — SIGNIFICANT CHANGE UP (ref 11.5–15.5)
IANC: 5.63 K/UL — SIGNIFICANT CHANGE UP (ref 1.8–7.4)
IMM GRANULOCYTES NFR BLD AUTO: 2.3 % — HIGH (ref 0–0.9)
LACTATE BLDV-MCNC: 0.8 MMOL/L — SIGNIFICANT CHANGE UP (ref 0.5–2)
LIDOCAIN IGE QN: 44 U/L — SIGNIFICANT CHANGE UP (ref 7–60)
LYMPHOCYTES # BLD AUTO: 1.91 K/UL — SIGNIFICANT CHANGE UP (ref 1–3.3)
LYMPHOCYTES # BLD AUTO: 22.9 % — SIGNIFICANT CHANGE UP (ref 13–44)
MCHC RBC-ENTMCNC: 33.9 GM/DL — SIGNIFICANT CHANGE UP (ref 32–36)
MCHC RBC-ENTMCNC: 35.7 PG — HIGH (ref 27–34)
MCV RBC AUTO: 105.2 FL — HIGH (ref 80–100)
MONOCYTES # BLD AUTO: 0.42 K/UL — SIGNIFICANT CHANGE UP (ref 0–0.9)
MONOCYTES NFR BLD AUTO: 5 % — SIGNIFICANT CHANGE UP (ref 2–14)
NEUTROPHILS # BLD AUTO: 5.63 K/UL — SIGNIFICANT CHANGE UP (ref 1.8–7.4)
NEUTROPHILS NFR BLD AUTO: 67.4 % — SIGNIFICANT CHANGE UP (ref 43–77)
NRBC # BLD: 0 /100 WBCS — SIGNIFICANT CHANGE UP (ref 0–0)
NRBC # FLD: 0 K/UL — SIGNIFICANT CHANGE UP (ref 0–0)
PCO2 BLDV: 48 MMHG — HIGH (ref 39–42)
PH BLDV: 7.38 — SIGNIFICANT CHANGE UP (ref 7.32–7.43)
PLATELET # BLD AUTO: 356 K/UL — SIGNIFICANT CHANGE UP (ref 150–400)
PO2 BLDV: 38 MMHG — SIGNIFICANT CHANGE UP
POTASSIUM BLDV-SCNC: 3.5 MMOL/L — SIGNIFICANT CHANGE UP (ref 3.5–5.1)
POTASSIUM SERPL-MCNC: 3.8 MMOL/L — SIGNIFICANT CHANGE UP (ref 3.5–5.3)
POTASSIUM SERPL-SCNC: 3.8 MMOL/L — SIGNIFICANT CHANGE UP (ref 3.5–5.3)
PROT SERPL-MCNC: 7 G/DL — SIGNIFICANT CHANGE UP (ref 6–8.3)
RBC # BLD: 3.28 M/UL — LOW (ref 3.8–5.2)
RBC # FLD: 12 % — SIGNIFICANT CHANGE UP (ref 10.3–14.5)
SAO2 % BLDV: 60.5 % — SIGNIFICANT CHANGE UP
SODIUM SERPL-SCNC: 136 MMOL/L — SIGNIFICANT CHANGE UP (ref 135–145)
WBC # BLD: 8.35 K/UL — SIGNIFICANT CHANGE UP (ref 3.8–10.5)
WBC # FLD AUTO: 8.35 K/UL — SIGNIFICANT CHANGE UP (ref 3.8–10.5)

## 2023-01-10 PROCEDURE — 99285 EMERGENCY DEPT VISIT HI MDM: CPT

## 2023-01-10 RX ORDER — SODIUM CHLORIDE 9 MG/ML
1000 INJECTION INTRAMUSCULAR; INTRAVENOUS; SUBCUTANEOUS ONCE
Refills: 0 | Status: COMPLETED | OUTPATIENT
Start: 2023-01-10 | End: 2023-01-10

## 2023-01-10 RX ORDER — ACETAMINOPHEN 500 MG
1000 TABLET ORAL ONCE
Refills: 0 | Status: COMPLETED | OUTPATIENT
Start: 2023-01-10 | End: 2023-01-10

## 2023-01-10 RX ORDER — FAMOTIDINE 10 MG/ML
20 INJECTION INTRAVENOUS ONCE
Refills: 0 | Status: COMPLETED | OUTPATIENT
Start: 2023-01-10 | End: 2023-01-10

## 2023-01-10 RX ORDER — ONDANSETRON 8 MG/1
4 TABLET, FILM COATED ORAL ONCE
Refills: 0 | Status: COMPLETED | OUTPATIENT
Start: 2023-01-10 | End: 2023-01-10

## 2023-01-10 RX ORDER — MORPHINE SULFATE 50 MG/1
4 CAPSULE, EXTENDED RELEASE ORAL ONCE
Refills: 0 | Status: DISCONTINUED | OUTPATIENT
Start: 2023-01-10 | End: 2023-01-10

## 2023-01-10 RX ADMIN — ONDANSETRON 4 MILLIGRAM(S): 8 TABLET, FILM COATED ORAL at 19:50

## 2023-01-10 RX ADMIN — Medication 400 MILLIGRAM(S): at 19:51

## 2023-01-10 RX ADMIN — MORPHINE SULFATE 4 MILLIGRAM(S): 50 CAPSULE, EXTENDED RELEASE ORAL at 19:50

## 2023-01-10 RX ADMIN — FAMOTIDINE 20 MILLIGRAM(S): 10 INJECTION INTRAVENOUS at 19:50

## 2023-01-10 RX ADMIN — SODIUM CHLORIDE 1000 MILLILITER(S): 9 INJECTION INTRAMUSCULAR; INTRAVENOUS; SUBCUTANEOUS at 19:50

## 2023-01-10 RX ADMIN — Medication 100 MILLIGRAM(S): at 21:07

## 2023-01-10 NOTE — ED PROVIDER NOTE - PHYSICAL EXAMINATION
uncomfortable appearing,  well nourished, awake, alert, oriented to person, place, time/situation    Airway patent, poor dentition, Left facial swelling, tolerating secretions, no drooling.     Eyes without scleral injection. No jaundice.    Strong pulse.    Respirations unlabored.    Abdomen soft, non-tender, no guarding.    Spine appears normal, range of motion is not limited, no muscle or joint tenderness.    Alert and oriented, no gross motor or sensory deficits.    Skin normal color for race, warm, dry and intact. No evidence of rash.

## 2023-01-10 NOTE — CONSULT NOTE ADULT - SUBJECTIVE AND OBJECTIVE BOX
CC: Left facial swelling     HPI: 47F pmhx thyroid cancer 1996 partial thyroidectomy and in 2011 total thyroidectomy and s/p CND 5/2022 p/w continued left facial swelling. Pt seen and examined at bedside. Pt was recently admitted at [facility] for left facial swelling since  a few days before [facility]. At [place] about 30cc of pus was obtained. Pt was on IV zosyn antibiotics while inpatient and d/c on Augmentin.  Pt c/o increased pain and slight increased in swelling since d/c on Saturday (1/7), hence why she returned to [facility] ED today. Pt reported since d/c she has been taking the prescribed Augmentin and that she went and saw the oral surgeon Pt explained that she had imaging done that suggested the source was not an infected tooth. Pt endorses shes is able to eat and drink on the right side. Pt endorses minimal opening of jaw but this has been consistent Pt admits to HA and facial pressure. Pt denies SOB, fevers       PAST MEDICAL & SURGICAL HISTORY:  Thyroid cancer  1996 with ? left partial thyroidectomy with NO post op chemotherapy or RT and in 2011 with total thyroidectomy with NO post op chemotherapy or RT      Neck mass  2022      H/O iron deficiency anemia  followed by hematology. Has received iron infusions. Last infusion in January 2021      Anxiety and depression      History of hoarseness  2022      Lumbar spinal stenosis  and bulging disc      Pain  cystic lesion anterior to the sacrum --- followed by neurologist      H/O thyroidectomy  1996 partial thyroidectomy and in 2011 total thyroidectomy      History of sleeve gastrectomy  has lost 280 pounds      Excessive vaginal bleeding  hyterectomy Jan 2021 -- received 7 units PRBC        Allergies    No Known Allergies    Intolerances      MEDICATIONS  (STANDING):  clindamycin IVPB 900 milliGRAM(s) IV Intermittent once    MEDICATIONS  (PRN):           Family history: No pertinent family history in first degree relatives    ROS:   ENT: all negative except as noted in HPI   CV: denies palpitations  Pulm: denies SOB, cough, hemoptysis  GI: denies change in appetite, indigestion, n/v  : denies pertinent urinary symptoms, urgency  Neuro: denies numbness/tingling, loss of sensation  Psych: denies anxiety  MS: denies muscle weakness, instability  Heme: denies easy bruising or bleeding  Endo: denies heat/cold intolerance, excessive sweating  Vascular: denies LE edema    Vital Signs Last 24 Hrs  T(C): 36.7 (10 Chaz 2023 16:50), Max: 36.7 (10 Chaz 2023 16:50)  T(F): 98 (10 Chaz 2023 16:50), Max: 98 (10 Chaz 2023 16:50)  HR: 64 (10 Chaz 2023 16:50) (64 - 68)  BP: 122/71 (10 Chaz 2023 16:50) (122/71 - 134/92)  RR: 18 (10 Chaz 2023 16:50) (18 - 18)  SpO2: 100% (10 Chaz 2023 16:50) (100% - 100%)    Parameters below as of 10 Chaz 2023 15:23  Patient On (Oxygen Delivery Method): room air                              11.7   8.35  )-----------( 356      ( 10 Chaz 2023 20:00 )             34.5    01-10    136  |  100  |  12  ----------------------------<  99  3.8   |  27  |  1.00    Ca    8.4      10 Chaz 2023 20:00    TPro  7.0  /  Alb  3.8  /  TBili  0.2  /  DBili  x   /  AST  22  /  ALT  10  /  AlkPhos  65  01-10       PHYSICAL EXAM:  Gen: NAD  Skin: No rashes, bruises, or lesions  Head: Normocephalic, Atraumatic  Face:  Left sided facial erythema noted with + swelling   Eyes: no scleral injection  Ears: Right - external ear without abnormalities           Left - external ear without abnormalities   Nose: Nares bilaterally patent, no discharge  Mouth:  POOR dentition noted, left sided necrotic tooth. No stridor, no drooling, no trismus.  Mucosa moist, tongue/uvula midline,   Neck: Flat, supple, no lymphadenopathy, previous thyroidectomy scar noted   Lymphatic: No lymphadenopathy  Resp: breathing easily, no stridor  CV: no peripheral edema/cyanosis  GI: nondistended   Peripheral vascular: no JVD or edema  Neuro: facial nerve intact, no facial droop       IMAGING/ADDITIONAL STUDIES:     ** CT pending **  CC: Left facial swelling     HPI: 47F pmhx thyroid cancer 1996 partial thyroidectomy and in 2011 total thyroidectomy and s/p CND 5/2022 p/w continued left facial swelling. Pt seen and examined at bedside. Pt was recently admitted at [facility] for left facial swelling x 20 days. At [place] about 30cc of pus was obtained. Pt was on IV zosyn antibiotics while inpatient and d/c on Augmentin.  Pt c/o increased pain and slight increased in swelling since d/c on Saturday (1/7), hence why she returned to [facility] ED today. Pt reported since d/c she has been taking the prescribed Augmentin and that she went and saw the oral surgeon.  Pt explained that she had imaging done that suggested the source was not an infected tooth. Pt endorses shes is able to eat and drink on the right side. Pt endorses minimal opening of jaw but this has been consistent Pt admits to HA and facial pressure. Pt denies SOB, fevers       PAST MEDICAL & SURGICAL HISTORY:  Thyroid cancer  1996 with ? left partial thyroidectomy with NO post op chemotherapy or RT and in 2011 with total thyroidectomy with NO post op chemotherapy or RT      Neck mass  2022      H/O iron deficiency anemia  followed by hematology. Has received iron infusions. Last infusion in January 2021      Anxiety and depression      History of hoarseness  2022      Lumbar spinal stenosis  and bulging disc      Pain  cystic lesion anterior to the sacrum --- followed by neurologist      H/O thyroidectomy  1996 partial thyroidectomy and in 2011 total thyroidectomy      History of sleeve gastrectomy  has lost 280 pounds      Excessive vaginal bleeding  hyterectomy Jan 2021 -- received 7 units PRBC        Allergies    No Known Allergies    Intolerances      MEDICATIONS  (STANDING):  clindamycin IVPB 900 milliGRAM(s) IV Intermittent once    MEDICATIONS  (PRN):           Family history: No pertinent family history in first degree relatives    ROS:   ENT: all negative except as noted in HPI   CV: denies palpitations  Pulm: denies SOB, cough, hemoptysis  GI: denies change in appetite, indigestion, n/v  : denies pertinent urinary symptoms, urgency  Neuro: denies numbness/tingling, loss of sensation  Psych: denies anxiety  MS: denies muscle weakness, instability  Heme: denies easy bruising or bleeding  Endo: denies heat/cold intolerance, excessive sweating  Vascular: denies LE edema    Vital Signs Last 24 Hrs  T(C): 36.7 (10 Chaz 2023 16:50), Max: 36.7 (10 Chaz 2023 16:50)  T(F): 98 (10 Chaz 2023 16:50), Max: 98 (10 Chaz 2023 16:50)  HR: 64 (10 Chaz 2023 16:50) (64 - 68)  BP: 122/71 (10 Chaz 2023 16:50) (122/71 - 134/92)  RR: 18 (10 Chaz 2023 16:50) (18 - 18)  SpO2: 100% (10 Chaz 2023 16:50) (100% - 100%)    Parameters below as of 10 Chaz 2023 15:23  Patient On (Oxygen Delivery Method): room air                              11.7   8.35  )-----------( 356      ( 10 Chaz 2023 20:00 )             34.5    01-10    136  |  100  |  12  ----------------------------<  99  3.8   |  27  |  1.00    Ca    8.4      10 Chaz 2023 20:00    TPro  7.0  /  Alb  3.8  /  TBili  0.2  /  DBili  x   /  AST  22  /  ALT  10  /  AlkPhos  65  01-10       PHYSICAL EXAM:  Gen: NAD  Skin: No rashes, bruises, or lesions  Head: Normocephalic, Atraumatic  Face:  Left sided facial erythema noted with + swelling   Eyes: no scleral injection  Ears: Right - external ear without abnormalities           Left - external ear without abnormalities   Nose: Nares bilaterally patent, no discharge  Mouth:  POOR dentition noted, left sided necrotic tooth. No stridor, no drooling, no trismus.  Mucosa moist, tongue/uvula midline,   Neck: Flat, supple, no lymphadenopathy, previous thyroidectomy scar noted   Lymphatic: No lymphadenopathy  Resp: breathing easily, no stridor  CV: no peripheral edema/cyanosis  GI: nondistended   Peripheral vascular: no JVD or edema  Neuro: facial nerve intact, no facial droop       IMAGING/ADDITIONAL STUDIES:     ** CT pending **  CC: Left facial swelling     HPI: 47F pmhx thyroid cancer 1996 partial thyroidectomy and in 2011 total thyroidectomy and s/p CND 5/2022 p/w continued left facial swelling. Pt seen and examined at bedside. Pt was recently admitted at [facility](1/2[facility]1/7) for left facial abscess x 20 days. At [place] about 30cc of pus was removed. Pt was on IV zosyn antibiotics while inpatient and d/c on Augmentin.  Pt c/o increased pain and slight increased in swelling since d/c on Saturday (1/7), hence why she returned to [facility] ED today. Pt reported since d/c she has been taking the prescribed Augmentin and that she went and saw an oral surgeon. Pt explained that she had imaging done that suggested the source was not an infected tooth. Pt endorses shes is able to eat and drink on the right side. Pt endorses minimal opening of jaw but this has been consistent.  Pt admits to HA and facial pressure/ pain at site. Pt denies SOB      PAST MEDICAL & SURGICAL HISTORY:  Thyroid cancer  1996 with ? left partial thyroidectomy with NO post op chemotherapy or RT and in 2011 with total thyroidectomy with NO post op chemotherapy or RT      Neck mass  2022      H/O iron deficiency anemia  followed by hematology. Has received iron infusions. Last infusion in January 2021      Anxiety and depression      History of hoarseness  2022      Lumbar spinal stenosis  and bulging disc      Pain  cystic lesion anterior to the sacrum --- followed by neurologist      H/O thyroidectomy  1996 partial thyroidectomy and in 2011 total thyroidectomy      History of sleeve gastrectomy  has lost 280 pounds      Excessive vaginal bleeding  hyterectomy Jan 2021 -- received 7 units PRBC        Allergies    No Known Allergies    Intolerances      MEDICATIONS  (STANDING):  clindamycin IVPB 900 milliGRAM(s) IV Intermittent once    MEDICATIONS  (PRN):           Family history: No pertinent family history in first degree relatives    ROS:   ENT: all negative except as noted in HPI   CV: denies palpitations  Pulm: denies SOB, cough, hemoptysis  GI: denies change in appetite, indigestion, n/v  : denies pertinent urinary symptoms, urgency  Neuro: denies numbness/tingling, loss of sensation  Psych: denies anxiety  MS: denies muscle weakness, instability  Heme: denies easy bruising or bleeding  Endo: denies heat/cold intolerance, excessive sweating  Vascular: denies LE edema    Vital Signs Last 24 Hrs  T(C): 36.7 (10 Chaz 2023 16:50), Max: 36.7 (10 Chaz 2023 16:50)  T(F): 98 (10 Chaz 2023 16:50), Max: 98 (10 Chaz 2023 16:50)  HR: 64 (10 Chaz 2023 16:50) (64 - 68)  BP: 122/71 (10 Chaz 2023 16:50) (122/71 - 134/92)  RR: 18 (10 Chaz 2023 16:50) (18 - 18)  SpO2: 100% (10 Chaz 2023 16:50) (100% - 100%)    Parameters below as of 10 Chaz 2023 15:23  Patient On (Oxygen Delivery Method): room air                              11.7   8.35  )-----------( 356      ( 10 Chaz 2023 20:00 )             34.5    01-10    136  |  100  |  12  ----------------------------<  99  3.8   |  27  |  1.00    Ca    8.4      10 Chaz 2023 20:00    TPro  7.0  /  Alb  3.8  /  TBili  0.2  /  DBili  x   /  AST  22  /  ALT  10  /  AlkPhos  65  01-10       PHYSICAL EXAM:  Gen: NAD  Skin: No rashes, bruises, or lesions  Head: Normocephalic, Atraumatic  Face:  Left sided facial erythema noted with + swelling   Eyes: no scleral injection  Ears: Right - external ear without abnormalities           Left - external ear without abnormalities   Nose: Nares bilaterally patent, no discharge  Mouth:  POOR dentition noted, left sided necrotic tooth. No stridor, no drooling, no trismus.  Mucosa moist, tongue/uvula midline,   Neck: Flat, supple, no lymphadenopathy, previous thyroidectomy scar noted   Lymphatic: No lymphadenopathy  Resp: breathing easily, no stridor  CV: no peripheral edema/cyanosis  GI: nondistended   Peripheral vascular: no JVD or edema  Neuro: facial nerve intact, no facial droop       IMAGING/ADDITIONAL STUDIES:     ** CT pending **

## 2023-01-10 NOTE — ED PROVIDER NOTE - OBJECTIVE STATEMENT
47y Female with PMHx of depression, anxiety, and thyroid cancer s/p left partial thyroidectomy (1996) and central neck dissection (5/22) who presents with left sided facial swelling and severe pain, Patient was admitted to [facility] from January 2 to January 7 for left-sided facial abscess she was seen by ENT, I&D performed twice. patient was discharged home on Augmentin which she has been taking and pain medication.  She followed up with oral surgery as advised and her oral surgeon did a Panorex and told her that the swelling and abscess was not related to her tooth and that she needed to return back to [facility] or [place] for further evaluation and further imaging.  Patient reports T-max of 100, intermittent nausea.  Denies abdominal pain chest pain, shortness of breath, difficulty swallowing.  Patient is tolerating her secretions. 47y Female with PMHx of depression, anxiety, and thyroid cancer s/p left partial thyroidectomy () and central neck dissection () who presents with left sided facial swelling and severe pain, Patient was admitted to [facility] from  to  for left-sided facial abscess she was seen by ENT, I&D performed twice. patient was discharged home on Augmentin which she has been taking and pain medication.  She followed up with oral surgery as advised and her oral surgeon did a Panorex and told her that the swelling and abscess was not related to her tooth and that she needed to return back to [facility] or [place] for further evaluation and further imaging.  Patient reports T-max of 100, intermittent nausea.  Denies abdominal pain chest pain, shortness of breath, difficulty swallowing.  Patient is tolerating her secretions.    Attendinyo female presents with pain and swelling to the left cheek.  was recently admitted to [facility] for abscess in that cheek.  it was drained twice.  pt discharged on amoxicillin.  here because pain and swelling has been persistent.

## 2023-01-10 NOTE — ED ADULT TRIAGE NOTE - CHIEF COMPLAINT QUOTE
Pt states she's here for a dental abscess. Recently spend 6 days at another hospital being treated for it, currently on antibiotics and pain meds. Pt with swelling to left side of face. Pt saw her oral surgeon who did an xray and advised her to come to ED because he suspects the abscess can be in the soft tissues and the bone.

## 2023-01-10 NOTE — ED PROVIDER NOTE - PROGRESS NOTE DETAILS
ent evaluated, pending Imaging will likely need I&D Patient received on signout from Dr. Loredo Pending CT and ENT evaluation.  Patient stable signed out to Dr. Rhodes

## 2023-01-10 NOTE — ED ADULT NURSE NOTE - OBJECTIVE STATEMENT
Pt received into [facility] spot [facility]1. AAOx4, c/o swelling to left side of mouth. Pt states she was recently in hospital being treated for left sided dental abscess. c/o pain to area. No drooling/stridor noted. Respiratory even and unlabored. MD pablo performed. no acute distress. Awaiting further plan of care.

## 2023-01-10 NOTE — CONSULT NOTE ADULT - ASSESSMENT
47F pmhx thyroid cancer 1996 partial thyroidectomy and in 2011 total thyroidectomy and s/p CND 5/2022 p/w continued left facial swelling.

## 2023-01-10 NOTE — ED PROVIDER NOTE - NS ED ATTENDING STATEMENT MOD
This was a shared visit with the HUSSEIN. I reviewed and verified the documentation and independently performed the documented:

## 2023-01-10 NOTE — ED PROVIDER NOTE - CLINICAL SUMMARY MEDICAL DECISION MAKING FREE TEXT BOX
47y Female with PMHx of depression, anxiety, and thyroid cancer s/p left partial thyroidectomy (1996) and central neck dissection (5/22) who presents with left sided facial swelling and severe pain, Patient was admitted to [facility] from January 2 to January 7 for left-sided facial abscess she was seen by ENT, I&D performed twice. patient was discharged home on Augmentin which she has been taking and pain medication.  She followed up with oral surgery as advised and her oral surgeon did a Panorex and told her that the swelling and abscess was not related to her tooth and that she needed to return back to [facility] or [place] for further evaluation and further imaging.  Patient reports T-max of 100, intermittent nausea.

## 2023-01-11 ENCOUNTER — TRANSCRIPTION ENCOUNTER (OUTPATIENT)
Age: 48
End: 2023-01-11

## 2023-01-11 DIAGNOSIS — L02.01 CUTANEOUS ABSCESS OF FACE: ICD-10-CM

## 2023-01-11 DIAGNOSIS — Z29.9 ENCOUNTER FOR PROPHYLACTIC MEASURES, UNSPECIFIED: ICD-10-CM

## 2023-01-11 DIAGNOSIS — E03.9 HYPOTHYROIDISM, UNSPECIFIED: ICD-10-CM

## 2023-01-11 DIAGNOSIS — F41.9 ANXIETY DISORDER, UNSPECIFIED: ICD-10-CM

## 2023-01-11 DIAGNOSIS — D53.9 NUTRITIONAL ANEMIA, UNSPECIFIED: ICD-10-CM

## 2023-01-11 LAB
FLUAV AG NPH QL: SIGNIFICANT CHANGE UP
FLUBV AG NPH QL: SIGNIFICANT CHANGE UP
RSV RNA NPH QL NAA+NON-PROBE: SIGNIFICANT CHANGE UP
SARS-COV-2 RNA SPEC QL NAA+PROBE: SIGNIFICANT CHANGE UP

## 2023-01-11 PROCEDURE — 99223 1ST HOSP IP/OBS HIGH 75: CPT

## 2023-01-11 PROCEDURE — 99232 SBSQ HOSP IP/OBS MODERATE 35: CPT | Mod: GC

## 2023-01-11 PROCEDURE — 70487 CT MAXILLOFACIAL W/DYE: CPT | Mod: 26,MA

## 2023-01-11 PROCEDURE — 12345: CPT | Mod: NC

## 2023-01-11 RX ORDER — HYDROMORPHONE HYDROCHLORIDE 2 MG/ML
0.5 INJECTION INTRAMUSCULAR; INTRAVENOUS; SUBCUTANEOUS ONCE
Refills: 0 | Status: DISCONTINUED | OUTPATIENT
Start: 2023-01-11 | End: 2023-01-11

## 2023-01-11 RX ORDER — SODIUM CHLORIDE 9 MG/ML
1000 INJECTION INTRAMUSCULAR; INTRAVENOUS; SUBCUTANEOUS
Refills: 0 | Status: DISCONTINUED | OUTPATIENT
Start: 2023-01-12 | End: 2023-01-12

## 2023-01-11 RX ORDER — AMPICILLIN SODIUM AND SULBACTAM SODIUM 250; 125 MG/ML; MG/ML
INJECTION, POWDER, FOR SUSPENSION INTRAMUSCULAR; INTRAVENOUS
Refills: 0 | Status: DISCONTINUED | OUTPATIENT
Start: 2023-01-11 | End: 2023-01-19

## 2023-01-11 RX ORDER — INFLUENZA VIRUS VACCINE 15; 15; 15; 15 UG/.5ML; UG/.5ML; UG/.5ML; UG/.5ML
0.5 SUSPENSION INTRAMUSCULAR ONCE
Refills: 0 | Status: COMPLETED | OUTPATIENT
Start: 2023-01-11 | End: 2023-01-11

## 2023-01-11 RX ORDER — LEVOTHYROXINE SODIUM 125 MCG
100 TABLET ORAL DAILY
Refills: 0 | Status: DISCONTINUED | OUTPATIENT
Start: 2023-01-11 | End: 2023-01-12

## 2023-01-11 RX ORDER — MORPHINE SULFATE 50 MG/1
2 CAPSULE, EXTENDED RELEASE ORAL EVERY 4 HOURS
Refills: 0 | Status: DISCONTINUED | OUTPATIENT
Start: 2023-01-11 | End: 2023-01-11

## 2023-01-11 RX ORDER — CHLORHEXIDINE GLUCONATE 213 G/1000ML
15 SOLUTION TOPICAL
Refills: 0 | Status: DISCONTINUED | OUTPATIENT
Start: 2023-01-11 | End: 2023-01-19

## 2023-01-11 RX ORDER — SERTRALINE 25 MG/1
50 TABLET, FILM COATED ORAL DAILY
Refills: 0 | Status: DISCONTINUED | OUTPATIENT
Start: 2023-01-11 | End: 2023-02-01

## 2023-01-11 RX ORDER — AMPICILLIN SODIUM AND SULBACTAM SODIUM 250; 125 MG/ML; MG/ML
3 INJECTION, POWDER, FOR SUSPENSION INTRAMUSCULAR; INTRAVENOUS EVERY 6 HOURS
Refills: 0 | Status: DISCONTINUED | OUTPATIENT
Start: 2023-01-11 | End: 2023-01-19

## 2023-01-11 RX ORDER — SODIUM CHLORIDE 9 MG/ML
1000 INJECTION INTRAMUSCULAR; INTRAVENOUS; SUBCUTANEOUS
Refills: 0 | Status: DISCONTINUED | OUTPATIENT
Start: 2023-01-11 | End: 2023-01-11

## 2023-01-11 RX ORDER — FAMOTIDINE 10 MG/ML
20 INJECTION INTRAVENOUS DAILY
Refills: 0 | Status: DISCONTINUED | OUTPATIENT
Start: 2023-01-11 | End: 2023-02-01

## 2023-01-11 RX ORDER — ONDANSETRON 8 MG/1
4 TABLET, FILM COATED ORAL ONCE
Refills: 0 | Status: COMPLETED | OUTPATIENT
Start: 2023-01-11 | End: 2023-01-11

## 2023-01-11 RX ORDER — KETOROLAC TROMETHAMINE 30 MG/ML
15 SYRINGE (ML) INJECTION EVERY 8 HOURS
Refills: 0 | Status: DISCONTINUED | OUTPATIENT
Start: 2023-01-11 | End: 2023-01-14

## 2023-01-11 RX ORDER — GABAPENTIN 400 MG/1
200 CAPSULE ORAL EVERY 8 HOURS
Refills: 0 | Status: DISCONTINUED | OUTPATIENT
Start: 2023-01-11 | End: 2023-02-01

## 2023-01-11 RX ORDER — QUETIAPINE FUMARATE 200 MG/1
50 TABLET, FILM COATED ORAL DAILY
Refills: 0 | Status: DISCONTINUED | OUTPATIENT
Start: 2023-01-11 | End: 2023-02-01

## 2023-01-11 RX ORDER — HYDROMORPHONE HYDROCHLORIDE 2 MG/ML
0.5 INJECTION INTRAMUSCULAR; INTRAVENOUS; SUBCUTANEOUS EVERY 4 HOURS
Refills: 0 | Status: DISCONTINUED | OUTPATIENT
Start: 2023-01-11 | End: 2023-01-11

## 2023-01-11 RX ORDER — AMPICILLIN SODIUM AND SULBACTAM SODIUM 250; 125 MG/ML; MG/ML
3 INJECTION, POWDER, FOR SUSPENSION INTRAMUSCULAR; INTRAVENOUS ONCE
Refills: 0 | Status: COMPLETED | OUTPATIENT
Start: 2023-01-11 | End: 2023-01-11

## 2023-01-11 RX ORDER — MORPHINE SULFATE 50 MG/1
2 CAPSULE, EXTENDED RELEASE ORAL EVERY 4 HOURS
Refills: 0 | Status: DISCONTINUED | OUTPATIENT
Start: 2023-01-11 | End: 2023-01-12

## 2023-01-11 RX ORDER — MORPHINE SULFATE 50 MG/1
4 CAPSULE, EXTENDED RELEASE ORAL ONCE
Refills: 0 | Status: DISCONTINUED | OUTPATIENT
Start: 2023-01-11 | End: 2023-01-11

## 2023-01-11 RX ADMIN — Medication 15 MILLIGRAM(S): at 21:28

## 2023-01-11 RX ADMIN — CHLORHEXIDINE GLUCONATE 15 MILLILITER(S): 213 SOLUTION TOPICAL at 17:18

## 2023-01-11 RX ADMIN — AMPICILLIN SODIUM AND SULBACTAM SODIUM 200 GRAM(S): 250; 125 INJECTION, POWDER, FOR SUSPENSION INTRAMUSCULAR; INTRAVENOUS at 07:00

## 2023-01-11 RX ADMIN — HYDROMORPHONE HYDROCHLORIDE 0.5 MILLIGRAM(S): 2 INJECTION INTRAMUSCULAR; INTRAVENOUS; SUBCUTANEOUS at 09:44

## 2023-01-11 RX ADMIN — ONDANSETRON 4 MILLIGRAM(S): 8 TABLET, FILM COATED ORAL at 01:13

## 2023-01-11 RX ADMIN — SODIUM CHLORIDE 100 MILLILITER(S): 9 INJECTION INTRAMUSCULAR; INTRAVENOUS; SUBCUTANEOUS at 07:22

## 2023-01-11 RX ADMIN — Medication 15 MILLIGRAM(S): at 15:06

## 2023-01-11 RX ADMIN — MORPHINE SULFATE 2 MILLIGRAM(S): 50 CAPSULE, EXTENDED RELEASE ORAL at 21:28

## 2023-01-11 RX ADMIN — GABAPENTIN 200 MILLIGRAM(S): 400 CAPSULE ORAL at 13:05

## 2023-01-11 RX ADMIN — AMPICILLIN SODIUM AND SULBACTAM SODIUM 200 GRAM(S): 250; 125 INJECTION, POWDER, FOR SUSPENSION INTRAMUSCULAR; INTRAVENOUS at 18:29

## 2023-01-11 RX ADMIN — AMPICILLIN SODIUM AND SULBACTAM SODIUM 200 GRAM(S): 250; 125 INJECTION, POWDER, FOR SUSPENSION INTRAMUSCULAR; INTRAVENOUS at 12:27

## 2023-01-11 RX ADMIN — MORPHINE SULFATE 2 MILLIGRAM(S): 50 CAPSULE, EXTENDED RELEASE ORAL at 12:31

## 2023-01-11 RX ADMIN — MORPHINE SULFATE 2 MILLIGRAM(S): 50 CAPSULE, EXTENDED RELEASE ORAL at 17:09

## 2023-01-11 RX ADMIN — SERTRALINE 50 MILLIGRAM(S): 25 TABLET, FILM COATED ORAL at 21:30

## 2023-01-11 RX ADMIN — MORPHINE SULFATE 2 MILLIGRAM(S): 50 CAPSULE, EXTENDED RELEASE ORAL at 18:09

## 2023-01-11 RX ADMIN — HYDROMORPHONE HYDROCHLORIDE 0.5 MILLIGRAM(S): 2 INJECTION INTRAMUSCULAR; INTRAVENOUS; SUBCUTANEOUS at 06:07

## 2023-01-11 RX ADMIN — HYDROMORPHONE HYDROCHLORIDE 0.5 MILLIGRAM(S): 2 INJECTION INTRAMUSCULAR; INTRAVENOUS; SUBCUTANEOUS at 06:40

## 2023-01-11 RX ADMIN — Medication 15 MILLIGRAM(S): at 13:40

## 2023-01-11 RX ADMIN — QUETIAPINE FUMARATE 50 MILLIGRAM(S): 200 TABLET, FILM COATED ORAL at 21:28

## 2023-01-11 RX ADMIN — MORPHINE SULFATE 2 MILLIGRAM(S): 50 CAPSULE, EXTENDED RELEASE ORAL at 13:31

## 2023-01-11 RX ADMIN — MORPHINE SULFATE 4 MILLIGRAM(S): 50 CAPSULE, EXTENDED RELEASE ORAL at 01:13

## 2023-01-11 RX ADMIN — GABAPENTIN 200 MILLIGRAM(S): 400 CAPSULE ORAL at 21:28

## 2023-01-11 NOTE — PROGRESS NOTE ADULT - SUBJECTIVE AND OBJECTIVE BOX
Patient is a 47y old  Female who presents with a chief complaint of abscess (11 Jan 2023 05:59)      HPI:  47y Female with PMH of depression, anxiety, and thyroid cancer s/p left partial thyroidectomy (1996) and central neck dissection (5/22) who presented with recurrent left sided facial swelling and severe pain. Pt was recently admitted at [facility](1/2-1/7) for left facial abscess x 20 days, underwent drainage, placed  on IV zosyn continued on on Augmentin. Notes despite compliance with Augmentin, pain increased and slight increased in swelling since d/c on Saturday (1/7).. Reports being seen by oral surgeon who deemed source was not odontogenic.  CT maxillofacial noteworthy for left transpatial facial abscess decreased relative to previous study, but component superficial to left masseter muscle increased in size. Pt with trismus but reports ability to eat and drink on right side. Seen by ENT but pt refused bedside drainage as previous  time was too painful (11 Jan 2023 05:59)      PAST MEDICAL & SURGICAL HISTORY:  Thyroid cancer  1996 with ? left partial thyroidectomy with NO post op chemotherapy or RT and in 2011 with total thyroidectomy with NO post op chemotherapy or RT        Neck mass  2022      H/O iron deficiency anemia  followed by hematology. Has received iron infusions. Last infusion in January 2021      Anxiety and depression      History of hoarseness  2022      Lumbar spinal stenosis  and bulging disc      Pain  cystic lesion anterior to the sacrum --- followed by neurologist      H/O thyroidectomy  1996 partial thyroidectomy and in 2011 total thyroidectomy      History of sleeve gastrectomy  has lost 280 pounds      Excessive vaginal bleeding  hyterectomy Jan 2021 -- received 7 units PRBC          MEDICATIONS  (STANDING):  ampicillin/sulbactam  IVPB      ampicillin/sulbactam  IVPB 3 Gram(s) IV Intermittent every 6 hours  gabapentin 200 milliGRAM(s) Oral every 8 hours  influenza   Vaccine 0.5 milliLiter(s) IntraMuscular once  levothyroxine 100 MICROGram(s) Oral daily  QUEtiapine 50 milliGRAM(s) Oral daily  sertraline 50 milliGRAM(s) Oral daily  sodium chloride 0.9%. 1000 milliLiter(s) (100 mL/Hr) IV Continuous <Continuous>    MEDICATIONS  (PRN):  HYDROmorphone  Injectable 0.5 milliGRAM(s) IV Push every 4 hours PRN Severe Pain (7 - 10)  morphine  - Injectable 2 milliGRAM(s) IV Push every 4 hours PRN Moderate Pain (4 - 6)      Allergies    No Known Allergies    Intolerances      Vital Signs Last 24 Hrs  T(C): 36.3 (11 Jan 2023 09:20), Max: 36.7 (10 Chaz 2023 16:50)  T(F): 97.3 (11 Jan 2023 09:20), Max: 98.1 (11 Jan 2023 06:00)  HR: 62 (11 Jan 2023 09:20) (62 - 68)  BP: 116/66 (11 Jan 2023 09:20) (116/66 - 134/92)  BP(mean): 92 (11 Jan 2023 02:07) (92 - 92)  RR: 16 (11 Jan 2023 09:20) (16 - 18)  SpO2: 98% (11 Jan 2023 09:20) (98% - 100%)    Parameters below as of 11 Jan 2023 09:20  Patient On (Oxygen Delivery Method): room air        EOE:             Mandible- trismus present, less than 20 mm opening.              Facial bones and MOM - grossly intact             ( + ) trismus             ( + ) swelling, left temporalis region, left buccal region             ( + ) asymmetry, left temporalis region, left buccal region             ( + ) palpation, pain on palpation to left temporalis region and left buccal region             ( - ) SOB             ( - ) dysphagia             ( - ) LOC    IOE:  permanent dentition: multiple carious teeth          Unable to do true clinical exam due to trismus. Noted multiple carious teeth, gingivitis, left facial fullness.     LABS:                        11.7   8.35  )-----------( 356      ( 10 Chaz 2023 20:00 )             34.5     01-10    136  |  100  |  12  ----------------------------<  99  3.8   |  27  |  1.00    Ca    8.4      10 Chaz 2023 20:00    TPro  7.0  /  Alb  3.8  /  TBili  0.2  /  DBili  x   /  AST  22  /  ALT  10  /  AlkPhos  65  01-10    Platelet Count - Automated: 356 K/uL [150 - 400] (01-10 @ 20:00)  WBC Count: 8.35 K/uL [3.80 - 10.50] (01-10 @ 20:00)        *DENTAL RADIOGRAPHS:   PAN taken and interpreted. Multiple carious teeth without PARL present.  Noted PARL on #19. Unlikely to be source of infection.     ASSESSMENT:  Absesses       RECOMMENDATIONS:  1) <<   >>  2) Dental F/U with outpatient dentist for comprehensive dental care.   3) If any difficulty swallowing/breathing, fever occur, page dental.     Resident Name, pager # Patient is a 47y old  Female who presents with a chief complaint of abscess (11 Jan 2023 05:59)      HPI:  47y Female with PMH of depression, anxiety, and thyroid cancer s/p left partial thyroidectomy (1996) and central neck dissection (5/22) who presented with recurrent left sided facial swelling and severe pain. Pt was recently admitted at [facility](1/2-1/7) for left facial abscess x 20 days, underwent drainage, placed  on IV zosyn continued on on Augmentin. Notes despite compliance with Augmentin, pain increased and slight increased in swelling since d/c on Saturday (1/7).. Reports being seen by oral surgeon who deemed source was not odontogenic.  CT maxillofacial noteworthy for left transpatial facial abscess decreased relative to previous study, but component superficial to left masseter muscle increased in size. Pt with trismus but reports ability to eat and drink on right side. Seen by ENT but pt refused bedside drainage as previous  time was too painful (11 Jan 2023 05:59)      PAST MEDICAL & SURGICAL HISTORY:  Thyroid cancer  1996 with ? left partial thyroidectomy with NO post op chemotherapy or RT and in 2011 with total thyroidectomy with NO post op chemotherapy or RT        Neck mass  2022      H/O iron deficiency anemia  followed by hematology. Has received iron infusions. Last infusion in January 2021      Anxiety and depression      History of hoarseness  2022      Lumbar spinal stenosis  and bulging disc      Pain  cystic lesion anterior to the sacrum --- followed by neurologist      H/O thyroidectomy  1996 partial thyroidectomy and in 2011 total thyroidectomy      History of sleeve gastrectomy  has lost 280 pounds      Excessive vaginal bleeding  hyterectomy Jan 2021 -- received 7 units PRBC          MEDICATIONS  (STANDING):  ampicillin/sulbactam  IVPB      ampicillin/sulbactam  IVPB 3 Gram(s) IV Intermittent every 6 hours  gabapentin 200 milliGRAM(s) Oral every 8 hours  influenza   Vaccine 0.5 milliLiter(s) IntraMuscular once  levothyroxine 100 MICROGram(s) Oral daily  QUEtiapine 50 milliGRAM(s) Oral daily  sertraline 50 milliGRAM(s) Oral daily  sodium chloride 0.9%. 1000 milliLiter(s) (100 mL/Hr) IV Continuous <Continuous>    MEDICATIONS  (PRN):  HYDROmorphone  Injectable 0.5 milliGRAM(s) IV Push every 4 hours PRN Severe Pain (7 - 10)  morphine  - Injectable 2 milliGRAM(s) IV Push every 4 hours PRN Moderate Pain (4 - 6)      Allergies    No Known Allergies    Intolerances      Vital Signs Last 24 Hrs  T(C): 36.3 (11 Jan 2023 09:20), Max: 36.7 (10 Chaz 2023 16:50)  T(F): 97.3 (11 Jan 2023 09:20), Max: 98.1 (11 Jan 2023 06:00)  HR: 62 (11 Jan 2023 09:20) (62 - 68)  BP: 116/66 (11 Jan 2023 09:20) (116/66 - 134/92)  BP(mean): 92 (11 Jan 2023 02:07) (92 - 92)  RR: 16 (11 Jan 2023 09:20) (16 - 18)  SpO2: 98% (11 Jan 2023 09:20) (98% - 100%)    Parameters below as of 11 Jan 2023 09:20  Patient On (Oxygen Delivery Method): room air        EOE:             Mandible- trismus present, less than 20 mm opening.              Facial bones and MOM - grossly intact             ( + ) trismus             ( + ) swelling, left temporalis region, left buccal region             ( + ) asymmetry, left temporalis region, left buccal region             ( + ) palpation, pain on palpation to left temporalis region and left buccal region             ( - ) SOB             ( - ) dysphagia             ( - ) LOC    IOE:  permanent dentition: multiple carious teeth          Noted multiple carious teeth, gingivitis, left facial fullness.     LABS:                        11.7   8.35  )-----------( 356      ( 10 Chaz 2023 20:00 )             34.5     01-10    136  |  100  |  12  ----------------------------<  99  3.8   |  27  |  1.00    Ca    8.4      10 Chaz 2023 20:00    TPro  7.0  /  Alb  3.8  /  TBili  0.2  /  DBili  x   /  AST  22  /  ALT  10  /  AlkPhos  65  01-10    Platelet Count - Automated: 356 K/uL [150 - 400] (01-10 @ 20:00)  WBC Count: 8.35 K/uL [3.80 - 10.50] (01-10 @ 20:00)        *DENTAL RADIOGRAPHS:   PAN taken and interpreted. Multiple carious teeth without PARL present.  Noted PARL on #19. Unlikely to be source of infection.     ASSESSMENT:  Abscesses not likely due to odontogenic infection.   Patient does have poor dentition and should receive follow up care.      RECOMMENDATIONS:  1) Continue care for drainage, abx and pain medications as per med team  2) Dental F/U with outpatient dentist for comprehensive dental care.        Jessica Patterson DDS #[id number]

## 2023-01-11 NOTE — PROGRESS NOTE ADULT - SUBJECTIVE AND OBJECTIVE BOX
INTERVAL HX:    Patient seen and examined. CT reviewed showing two abscesses - anterior to the left parotid and within the temporal region. Discussed with patient bedside drainage and she absolutely refused. She states that the last bedside drainage was too painful last time.     Vital Signs Last 24 Hrs  T(C): 36.3 (11 Jan 2023 02:07), Max: 36.7 (10 Chaz 2023 16:50)  T(F): 97.3 (11 Jan 2023 02:07), Max: 98 (10 Chaz 2023 16:50)  HR: 63 (11 Jan 2023 02:07) (63 - 68)  BP: 116/79 (11 Jan 2023 02:07) (116/79 - 134/92)  BP(mean): 92 (11 Jan 2023 02:07) (92 - 92)  RR: 17 (11 Jan 2023 02:07) (17 - 18)  SpO2: 98% (11 Jan 2023 02:07) (98% - 100%)    Parameters below as of 11 Jan 2023 02:07  Patient On (Oxygen Delivery Method): room air    NAD, lying in bed  Breathing comfortably on room air, no stridor, stertor  OC/OP: no erythema, bleeding, lacerations; dentition same as prior to surgery  Area of fluctuance overlying the left mandible that is tender to palpation, overlying erythema  Left temporal region with induration and firmness that is also tender, no clearly palpable fluctuance     A/P:   47y Female thyroid cancer 1996 partial thyroidectomy and in 2011 total thyroidectomy and s/p CND p/w left facial abscess that was I&D at outside hospital on 1/2 presents with recurrent facial swelling (cultures grew strep anginosus, no sensitivities performed). CT shows recurrent abscesses anterior to the parotid and within the temporal region. Patient refusing bedside I&D at this time    - admit to medicine for IV abx - unasyn for now based on previous culture   - fu blood cultures  - dental evaluation to evaluate for odontogenic source - patient has necrotic teeth and grew an anaerobe on culture, though was told by outside oral surgeon that teeth are not the source  - will follow up with patient to discuss I&D   - pain control  - d/w Dr. Hernandez, page with questions

## 2023-01-11 NOTE — H&P ADULT - NSHPPHYSICALEXAM_GEN_ALL_CORE
PHYSICAL EXAM:      Constitutional: NAD, well-groomed, well-developed  HEENT: EOMI, Normal Hearing; mid-lower left facial swelling/erythema/induration , + trismus  Neck: No LAD, No JVD  Back: Normal spine flexure, No CVA tenderness  Respiratory: CTAB  Cardiovascular: S1 and S2, RRR, no M/G/R  Gastrointestinal: BS+, soft, NT/ND  Extremities: No peripheral edema  Vascular: 2+ peripheral pulses  Neurological: A/O x 3, no focal deficits  Psychiatric: Normal mood, normal affect  Musculoskeletal: 5/5 strength b/l upper and lower extremities  Skin: No rashes

## 2023-01-11 NOTE — PROGRESS NOTE ADULT - PROBLEM SELECTOR PLAN 1
ENT, dental and OMFS consulted  -pt refused bedside I&D overnight  -plan for OR for I&D of abscesses and tooth extractions as needed  -pt is medically optimized for OR at this time  -previous abscess culture grew strep anginosus and Prevotella denticola  -c/w Unasyn empirically  -f/u blood cultures  -started Peridex swish and spit BID  -pain control with morphine PRN, added Toradol 15mg IV q8h x3 days

## 2023-01-11 NOTE — H&P ADULT - NSHPREVIEWOFSYSTEMS_GEN_ALL_CORE
Review of Systems:   CONSTITUTIONAL: No fever, chills  EYES: No eye pain, visual disturbances, or discharge  ENMT:  No difficulty hearing, tinnitus, vertigo; No sinus or throat pain; left facial pain and swelling  NECK: No pain or stiffness  RESPIRATORY: No cough, wheezing, chills or hemoptysis; No shortness of breath  CARDIOVASCULAR: No chest pain, palpitations, dizziness, or leg swelling  GASTROINTESTINAL: No abdominal or epigastric pain. No nausea, vomiting, or hematemesis; No diarrhea or constipation. No melena or hematochezia.  GENITOURINARY: No dysuria, frequency, hematuria, or incontinence  NEUROLOGICAL: No headaches, memory loss, loss of strength, numbness, or tremors  ENDOCRINE: No heat or cold intolerance; No hair loss  MUSCULOSKELETAL: No joint pain or swelling; No muscle, back, or extremity pain

## 2023-01-11 NOTE — CONSULT NOTE ADULT - ASSESSMENT
PLAN PENDING DISCUSSION WITH AN ATTENDING  Assessment  47F with +1 week hx of left facial swelling and trismus     Plan    PLAN PENDING DISCUSSION WITH AN ATTENDING  Assessment  47F with PMH of depression, anxiety, and thyroid cancer and +1 week hx of left facial swelling, trismus, and carious teeth #13, 19    Plan  - Discussed with ENT team who plan to take patient to OR today for I&D of left facial abscess  - OMFS to join ENT team for extraction of indicated teeth  - NPO until further notice  - Peridex mouth rinse BID swish and spit  - Pain control per primary team    Yeswhitney Bean DDS  OMFS  #[id number]   Assessment  47F with PMH of depression, anxiety, and thyroid cancer and +1 week hx of left facial swelling and trismus presents with left masseteric and left temporalis space abscess, with carious teeth #13, 19    Plan  - Discussed with ENT team who plan to take patient to OR on 1/12 AM for I&D of left facial abscess  - OMFS to join ENT team for extraction of indicated teeth  - NPO midnight  - Peridex mouth rinse BID swish and spit  - Pain control per primary team  - Abx per ENT    Patti Bean DDS  OMFS  #[id number]

## 2023-01-11 NOTE — PROGRESS NOTE ADULT - ASSESSMENT
47y Female with PMH of depression, anxiety, and thyroid cancer s/p left partial thyroidectomy (1996) and central neck dissection (5/22) with recent admission at [facility] for left facial abscess p/w recurrent abscess, failed output PO abx.

## 2023-01-11 NOTE — H&P ADULT - NSHPLABSRESULTS_GEN_ALL_CORE
11.7   8.35  )-----------( 356      ( 10 Chaz 2023 20:00 )             34.5     01-10    136  |  100  |  12  ----------------------------<  99  3.8   |  27  |  1.00    Ca    8.4      10 Chaz 2023 20:00    TPro  7.0  /  Alb  3.8  /  TBili  0.2  /  DBili  x   /  AST  22  /  ALT  10  /  AlkPhos  65  01-10    CAPILLARY BLOOD GLUCOSE            Vital Signs Last 24 Hrs  T(C): 36.7 (11 Jan 2023 06:00), Max: 36.7 (10 Chaz 2023 16:50)  T(F): 98.1 (11 Jan 2023 06:00), Max: 98.1 (11 Jan 2023 06:00)  HR: 63 (11 Jan 2023 06:00) (63 - 68)  BP: 126/76 (11 Jan 2023 06:00) (116/79 - 134/92)  BP(mean): 92 (11 Jan 2023 02:07) (92 - 92)  RR: 16 (11 Jan 2023 06:00) (16 - 18)  SpO2: 98% (11 Jan 2023 06:00) (98% - 100%)    Parameters below as of 11 Jan 2023 06:00  Patient On (Oxygen Delivery Method): room air

## 2023-01-11 NOTE — H&P ADULT - HISTORY OF PRESENT ILLNESS
47y Female with PMH of depression, anxiety, and thyroid cancer s/p left partial thyroidectomy (1996) and central neck dissection (5/22) who presented with recurrent left sided facial swelling and severe pain. Pt was recently admitted at [facility](1/2-1/7) for left facial abscess x 20 days, underwent drainage, placed  on IV zosyn continued on on Augmentin. Notes despite compliance with Augmentin, pain increased and slight increased in swelling since d/c on Saturday (1/7).. Reports being seen by oral surgeon who deemed source was not odontogenic.  CT maxillofacial noteworthy for left transpatial facial abscess decreased relative to previous study, but component superficial to left masseter muscle increased in size. Pt with trismus but reports ability to eat and drink on right side. Seen by ENT but pt refused bedside drainage as previous  time was too painful

## 2023-01-11 NOTE — H&P ADULT - PROBLEM SELECTOR PLAN 1
seen by ENT  -unasyn for now based on previous culture   - fu blood cultures  - dental evaluation to evaluate for odontogenic source - patient has necrotic teeth and grew an anaerobe on culture, though was told by outside oral surgeon that teeth are not the source  - ENT will follow up with patient to discuss I&D, currently refusing  - pain control

## 2023-01-11 NOTE — CONSULT NOTE ADULT - SUBJECTIVE AND OBJECTIVE BOX
PLAN PENDING DISCUSSION WITH ATTENDING  47y Female with PMH of depression, anxiety, and thyroid cancer s/p left partial thyroidectomy (1996) and central neck dissection (5/22) who presented with recurrent left sided facial swelling and severe pain. Pt was recently admitted at [facility](1/2-1/7) for left facial abscess x 20 days, underwent drainage x 2, placed  on IV zosyn continued on on Augmentin. Notes despite compliance with Augmentin, pain increased and slight increased in swelling since d/c on Saturday (1/7). Patient reports being seen by oral surgeon who deemed source was not odontogenic.  Vitals WNL, WBC 8.35, Afebrile. Denies pain on left facial and hx of chills, denies fever, nausea, dysphagia, dyspnea, odynophagia.         PAST MEDICAL & SURGICAL HISTORY:  Thyroid cancer  1996 with ? left partial thyroidectomy with NO post op chemotherapy or RT and in 2011 with total thyroidectomy with NO post op chemotherapy or RT      Neck mass  2022      H/O iron deficiency anemia  followed by hematology. Has received iron infusions. Last infusion in January 2021      Anxiety and depression      History of hoarseness  2022      Lumbar spinal stenosis  and bulging disc      Pain  cystic lesion anterior to the sacrum --- followed by neurologist      H/O thyroidectomy  1996 partial thyroidectomy and in 2011 total thyroidectomy      History of sleeve gastrectomy  has lost 280 pounds      Excessive vaginal bleeding  hyterectomy Jan 2021 -- received 7 units PRBC        MEDICATIONS  (STANDING):  ampicillin/sulbactam  IVPB      ampicillin/sulbactam  IVPB 3 Gram(s) IV Intermittent every 6 hours  famotidine    Tablet 20 milliGRAM(s) Oral daily  gabapentin 200 milliGRAM(s) Oral every 8 hours  influenza   Vaccine 0.5 milliLiter(s) IntraMuscular once  ketorolac   Injectable 15 milliGRAM(s) IV Push every 8 hours  levothyroxine 100 MICROGram(s) Oral daily  QUEtiapine 50 milliGRAM(s) Oral daily  sertraline 50 milliGRAM(s) Oral daily  sodium chloride 0.9%. 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  denies current toxic habits (11 Jan 2023 05:59)                 Labs:  Vital Signs Last 24 Hrs  T(C): 36.7 (11 Jan 2023 11:07), Max: 36.7 (10 Chaz 2023 16:50)  T(F): 98 (11 Jan 2023 11:07), Max: 98.1 (11 Jan 2023 06:00)  HR: 73 (11 Jan 2023 11:07) (62 - 73)  BP: 128/83 (11 Jan 2023 11:07) (116/66 - 134/92)  BP(mean): 92 (11 Jan 2023 02:07) (92 - 92)  RR: 16 (11 Jan 2023 11:07) (16 - 18)  SpO2: 100% (11 Jan 2023 11:07) (98% - 100%)    Parameters below as of 11 Jan 2023 11:07  Patient On (Oxygen Delivery Method): room air                            11.7   8.35  )-----------( 356      ( 10 Chaz 2023 20:00 )             34.5     CBC Full  -  ( 10 Chaz 2023 20:00 )  WBC Count : 8.35 K/uL  RBC Count : 3.28 M/uL  Hemoglobin : 11.7 g/dL  Hematocrit : 34.5 %  Platelet Count - Automated : 356 K/uL  Mean Cell Volume : 105.2 fL  Mean Cell Hemoglobin : 35.7 pg  Mean Cell Hemoglobin Concentration : 33.9 gm/dL  Auto Neutrophil # : 5.63 K/uL  Auto Lymphocyte # : 1.91 K/uL  Auto Monocyte # : 0.42 K/uL  Auto Eosinophil # : 0.12 K/uL  Auto Basophil # : 0.08 K/uL  Auto Neutrophil % : 67.4 %  Auto Lymphocyte % : 22.9 %  Auto Monocyte % : 5.0 %  Auto Eosinophil % : 1.4 %  Auto Basophil % : 1.0 %      COVID-19 PCR: NotDetec (03 Jan 2023 02:00)    I&O's Detail        Oxygen Saturation Index= Unable to calculate   [Based on FiO2 = Unknown, SpO2 = 100(01/11/2023 11:07), MAP = Unknown]                   47y Female with PMH of depression, anxiety, and thyroid cancer s/p left partial thyroidectomy (1996) and central neck dissection (5/22) who presented with recurrent left sided facial swelling and severe pain. Pt was recently admitted at [facility](1/2-1/7) for left facial abscess x 20 days, underwent drainage x 2, placed  on IV zosyn continued on on Augmentin. Notes despite compliance with Augmentin, pain increased and slight increased in swelling since d/c on Saturday (1/7). Patient reports being seen by oral surgeon who deemed source was not odontogenic.  Vitals WNL, WBC 8.35, Afebrile. Denies pain on left facial and hx of chills, denies fever, nausea, dysphagia, dyspnea, odynophagia.         PAST MEDICAL & SURGICAL HISTORY:  Thyroid cancer  1996 with ? left partial thyroidectomy with NO post op chemotherapy or RT and in 2011 with total thyroidectomy with NO post op chemotherapy or RT      Neck mass  2022      H/O iron deficiency anemia  followed by hematology. Has received iron infusions. Last infusion in January 2021      Anxiety and depression      History of hoarseness  2022      Lumbar spinal stenosis  and bulging disc      Pain  cystic lesion anterior to the sacrum --- followed by neurologist      H/O thyroidectomy  1996 partial thyroidectomy and in 2011 total thyroidectomy      History of sleeve gastrectomy  has lost 280 pounds      Excessive vaginal bleeding  hyterectomy Jan 2021 -- received 7 units PRBC        MEDICATIONS  (STANDING):  ampicillin/sulbactam  IVPB      ampicillin/sulbactam  IVPB 3 Gram(s) IV Intermittent every 6 hours  famotidine    Tablet 20 milliGRAM(s) Oral daily  gabapentin 200 milliGRAM(s) Oral every 8 hours  influenza   Vaccine 0.5 milliLiter(s) IntraMuscular once  ketorolac   Injectable 15 milliGRAM(s) IV Push every 8 hours  levothyroxine 100 MICROGram(s) Oral daily  QUEtiapine 50 milliGRAM(s) Oral daily  sertraline 50 milliGRAM(s) Oral daily  sodium chloride 0.9%. 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  denies current toxic habits (11 Jan 2023 05:59)    Labs:  Vital Signs Last 24 Hrs  T(C): 36.7 (11 Jan 2023 11:07), Max: 36.7 (10 Chaz 2023 16:50)  T(F): 98 (11 Jan 2023 11:07), Max: 98.1 (11 Jan 2023 06:00)  HR: 73 (11 Jan 2023 11:07) (62 - 73)  BP: 128/83 (11 Jan 2023 11:07) (116/66 - 134/92)  BP(mean): 92 (11 Jan 2023 02:07) (92 - 92)  RR: 16 (11 Jan 2023 11:07) (16 - 18)  SpO2: 100% (11 Jan 2023 11:07) (98% - 100%)    Parameters below as of 11 Jan 2023 11:07  Patient On (Oxygen Delivery Method): room air    Physical Exam  Gen: AAOx3, NAD  EOE: Firm left temporalis and firm left buccal/submandibular swelling w ++TTP, left inferior border of mandible not palpable  IOE: BI 25mm, firm  ++TTP left buccal mucosa, #19 fractured custp with TTP on lingual, #13 root tip TTP, FOM soft/NT  Panoramic: #19 decayed with PAP mesial root     CT NECK  FINDINGS:    When compared to CT on 1/2/2023, there has been decrease in size of large rim-enhancing fluid collection surrounding the left mandibular body and ramus, likely arising from the left mandibular second molar, which extends superiorly into the left temporalis muscle, a portion of which is deep to the zygomatic arch, also slightly smaller in size. However, there has been increased organization and size of component superficial to the left masseter muscle. Overall, inferior mandibular component measures up to 4.7 x 2.2 cm (604:36) and left temporal component measures up to 4.0 x 1.0 cm (4:40). Two tiny locules of gas are demonstrated within this multispatial abscess within the left temporalis and left masseter muscles. There is no deep extension. Additional dental caries noted.    There is trace mucosal thickening in the bilateral maxillary sinuses, frothy secretions within a right posterior ethmoid air cell, the remaining visualized paranasal sinuses are grossly clear. There are minimal left mastoid air cell effusions, the right mastoid air cells and bilateral tympanomastoid cavities are grossly clear.    Gross caries are seen within the maxilla and mandible. No large periapical lucencies are seen.    The osseous structures are intact. No lytic or destructive sclerotic lesions are seen.    The visualized portions of the brain, orbits, and cervical spines are unremarkable.                     47y Female with PMH of depression, anxiety, and thyroid cancer s/p left partial thyroidectomy (1996) and central neck dissection (5/22) who presented with recurrent left sided facial swelling and severe pain. Pt was recently admitted at [facility](1/2[facility]1/7) for left facial abscess x 20 days, underwent drainage x 2, placed  on IV zosyn continued on on Augmentin. Notes despite compliance with Augmentin, pain increased and slight increased in swelling since d/c on Saturday (1/7). Patient reports being seen by oral surgeon who deemed source was not odontogenic.  Vitals WNL, WBC 8.35, Afebrile. Endorses pain on left face and hx of chills, denies fever, nausea, dysphagia, dyspnea, odynophagia.         PAST MEDICAL & SURGICAL HISTORY:  Thyroid cancer  1996 with ? left partial thyroidectomy with NO post op chemotherapy or RT and in 2011 with total thyroidectomy with NO post op chemotherapy or RT      Neck mass  2022      H/O iron deficiency anemia  followed by hematology. Has received iron infusions. Last infusion in January 2021      Anxiety and depression      History of hoarseness  2022      Lumbar spinal stenosis  and bulging disc      Pain  cystic lesion anterior to the sacrum --- followed by neurologist      H/O thyroidectomy  1996 partial thyroidectomy and in 2011 total thyroidectomy      History of sleeve gastrectomy  has lost 280 pounds      Excessive vaginal bleeding  hyterectomy Jan 2021 -- received 7 units PRBC        MEDICATIONS  (STANDING):  ampicillin/sulbactam  IVPB      ampicillin/sulbactam  IVPB 3 Gram(s) IV Intermittent every 6 hours  famotidine    Tablet 20 milliGRAM(s) Oral daily  gabapentin 200 milliGRAM(s) Oral every 8 hours  influenza   Vaccine 0.5 milliLiter(s) IntraMuscular once  ketorolac   Injectable 15 milliGRAM(s) IV Push every 8 hours  levothyroxine 100 MICROGram(s) Oral daily  QUEtiapine 50 milliGRAM(s) Oral daily  sertraline 50 milliGRAM(s) Oral daily  sodium chloride 0.9%. 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  denies current toxic habits (11 Jan 2023 05:59)    Labs:  Vital Signs Last 24 Hrs  T(C): 36.7 (11 Jan 2023 11:07), Max: 36.7 (10 Chaz 2023 16:50)  T(F): 98 (11 Jan 2023 11:07), Max: 98.1 (11 Jan 2023 06:00)  HR: 73 (11 Jan 2023 11:07) (62 - 73)  BP: 128/83 (11 Jan 2023 11:07) (116/66 - 134/92)  BP(mean): 92 (11 Jan 2023 02:07) (92 - 92)  RR: 16 (11 Jan 2023 11:07) (16 - 18)  SpO2: 100% (11 Jan 2023 11:07) (98% - 100%)    Parameters below as of 11 Jan 2023 11:07  Patient On (Oxygen Delivery Method): room air    Physical Exam  Gen: AAOx3, NAD  EOE: Firm left temporalis and firm left buccal/submandibular swelling w ++TTP, left inferior border of mandible not palpable  IOE: BI 25mm, firm  ++TTP left buccal mucosa, #19 fractured custp with TTP on lingual, #13 root tip TTP, FOM soft/NT  Panoramic: #19 decayed with PAP mesial root     CT NECK  FINDINGS:    When compared to CT on 1/2/2023, there has been decrease in size of large rim-enhancing fluid collection surrounding the left mandibular body and ramus, likely arising from the left mandibular second molar, which extends superiorly into the left temporalis muscle, a portion of which is deep to the zygomatic arch, also slightly smaller in size. However, there has been increased organization and size of component superficial to the left masseter muscle. Overall, inferior mandibular component measures up to 4.7 x 2.2 cm (604:36) and left temporal component measures up to 4.0 x 1.0 cm (4:40). Two tiny locules of gas are demonstrated within this multispatial abscess within the left temporalis and left masseter muscles. There is no deep extension. Additional dental caries noted.    There is trace mucosal thickening in the bilateral maxillary sinuses, frothy secretions within a right posterior ethmoid air cell, the remaining visualized paranasal sinuses are grossly clear. There are minimal left mastoid air cell effusions, the right mastoid air cells and bilateral tympanomastoid cavities are grossly clear.    Gross caries are seen within the maxilla and mandible. No large periapical lucencies are seen.    The osseous structures are intact. No lytic or destructive sclerotic lesions are seen.    The visualized portions of the brain, orbits, and cervical spines are unremarkable.

## 2023-01-11 NOTE — PROGRESS NOTE ADULT - SUBJECTIVE AND OBJECTIVE BOX
Kandice Denny  [facility]  Pager #[phone]  Available on [employer] Teams    Patient is a 47y old  Female who presents with a chief complaint of abscess (11 Jan 2023 13:35)      SUBJECTIVE / OVERNIGHT EVENTS: Patient seen and examined at bedside. Pt c/o pain at site of abscess, reports good relief with morphine. Wants to be moved into a room with a window and a hospital bed. Tolerating diet but does not like the food.    ADDITIONAL REVIEW OF SYSTEMS:    MEDICATIONS  (STANDING):  ampicillin/sulbactam  IVPB      ampicillin/sulbactam  IVPB 3 Gram(s) IV Intermittent every 6 hours  chlorhexidine 0.12% Liquid 15 milliLiter(s) Swish and Spit two times a day  famotidine    Tablet 20 milliGRAM(s) Oral daily  gabapentin 200 milliGRAM(s) Oral every 8 hours  influenza   Vaccine 0.5 milliLiter(s) IntraMuscular once  ketorolac   Injectable 15 milliGRAM(s) IV Push every 8 hours  levothyroxine 100 MICROGram(s) Oral daily  QUEtiapine 50 milliGRAM(s) Oral daily  sertraline 50 milliGRAM(s) Oral daily  sodium chloride 0.9%. 1000 milliLiter(s) (100 mL/Hr) IV Continuous <Continuous>    MEDICATIONS  (PRN):  morphine  - Injectable 2 milliGRAM(s) IV Push every 4 hours PRN Moderate-severe pain      CAPILLARY BLOOD GLUCOSE        I&O's Summary      PHYSICAL EXAM:    Vital Signs Last 24 Hrs  T(C): 36.7 (11 Jan 2023 11:07), Max: 36.7 (10 Chaz 2023 16:50)  T(F): 98 (11 Jan 2023 11:07), Max: 98.1 (11 Jan 2023 06:00)  HR: 73 (11 Jan 2023 11:07) (62 - 73)  BP: 128/83 (11 Jan 2023 11:07) (116/66 - 134/92)  BP(mean): 92 (11 Jan 2023 02:07) (92 - 92)  RR: 16 (11 Jan 2023 11:07) (16 - 18)  SpO2: 100% (11 Jan 2023 11:07) (98% - 100%)    Parameters below as of 11 Jan 2023 11:07  Patient On (Oxygen Delivery Method): room air    CONSTITUTIONAL: NAD, well-developed, well-groomed  EYES: Conjunctiva and sclera clear  ENMT: Moist oral mucosa, poor dentition, multiple dental caries; L cheek swelling with erythema, warmth and induration, exquisitely tender to palpation; L temporal swelling extending superior to ear  NECK: Supple  RESPIRATORY: Normal respiratory effort; lungs are clear to auscultation bilaterally  CARDIOVASCULAR: Regular rate and rhythm, normal S1 and S2, no murmur/rub/gallop; No lower extremity edema  ABDOMEN: Soft, nontender to palpation, normoactive bowel sounds  PSYCH: A+O to person, place, and time  SKIN: Left facial erythema    LABS:                        11.7   8.35  )-----------( 356      ( 10 Chaz 2023 20:00 )             34.5     01-10    136  |  100  |  12  ----------------------------<  99  3.8   |  27  |  1.00    Ca    8.4      10 Chaz 2023 20:00    TPro  7.0  /  Alb  3.8  /  TBili  0.2  /  DBili  x   /  AST  22  /  ALT  10  /  AlkPhos  65  01-10    RADIOLOGY & ADDITIONAL TESTS:     < from: CT Maxillofacial w/ IV Cont (01.11.23 @ 01:04) >  FINDINGS:    When compared to CT on 1/2/2023, there has been decrease in size of large   rim-enhancing fluid collection surrounding the left mandibular body and   ramus, likely arising from the left mandibular second molar, which   extends superiorly into the left temporalis muscle, a portion of which is   deep to the zygomatic arch, also slightly smaller in size. However, there   has been increased organization and size of component superficial to the   left masseter muscle. Overall, inferior mandibular component measures up   to 4.7 x 2.2 cm (604:36) and left temporal component measures up to 4.0 x   1.0 cm (4:40). Two tiny locules of gas are demonstrated within this   multispatial abscess within the left temporalis and left masseter   muscles. There is no deep extension. Additional dental caries noted.    There istrace mucosal thickening in the bilateral maxillary sinuses,   frothy secretions within a right posterior ethmoid air cell, the   remaining visualized paranasal sinuses are grossly clear. There are   minimal left mastoid air cell effusions, the rightmastoid air cells and   bilateral tympanomastoid cavities are grossly clear.    Gross caries are seen within the maxilla and mandible. No large   periapical lucencies are seen.    The osseous structures are intact. No lytic or destructive sclerotic   lesions are seen.    The visualized portions of the brain, orbits, and cervical spines are   unremarkable.    IMPRESSION:    Evolving large left transpatial facial abscess, as described, overall   decreased in size when compared with the previous study, although   component superficial to the left masseter muscle has increased in size.    Results Reviewed: Yes  Imaging Personally Reviewed:  Electrocardiogram Personally Reviewed:    COORDINATION OF CARE:  Care Discussed with Consultants/Other Providers [Y/N]: Yes- dental and ENT- plan for OR for I&D of abscesses  Prior or Outpatient Records Reviewed [Y/N]: Yes- prior hospitalization at [facility] from 1/3-1/7

## 2023-01-11 NOTE — PATIENT PROFILE ADULT - FALL HARM RISK - HARM RISK INTERVENTIONS

## 2023-01-11 NOTE — PATIENT PROFILE ADULT - FUNCTIONAL ASSESSMENT - DAILY ACTIVITY 4.
03/26/18 1244   Family Communication   Family Update Message Procedure started   Delivery Origin Nurse  Steve Alvarez RN)    Relationship to Patient Daughter  (\"Darlyn\")    Phone Number ex [phone]   Family/Significant Other Update Called       Patient: Camila Shields MRN: [id number]  SSN: [id number]   YOB: 1964  Age: 46 y.o. Sex: female     Patient is status post Procedure(s):  LEFT TOTAL KNEE ARTHROPLASTY. Surgeon(s) and Role:     * Sagar Hickey MD - Primary     * John Dale, DO    Local/Dose/Irrigation:  70 mL (20 mL 0.9% NaCl, 30 mL 0.25% marcaine, 20 mL/266 mg Exparel)                  Peripheral IV 03/26/18 Right Forearm (Active)                           Dressing/Packing:  Wound Knee Left-DRESSING TYPE: Silver products; Cast padding;Elastic bandage (03/26/18 1319)  Splint/Cast:  ]    Other:  n/a 4 = No assist / stand by assistance

## 2023-01-11 NOTE — PROGRESS NOTE ADULT - ATTENDING COMMENTS
Pt seen and evaluated.  She is known to me, as she was admitted to [facility] last week with the L facial abscess.  This was drained x 3 with needle drainage and on her final sonogram there was no definite collection.  She was also clinically improving.  Pt was D/C'd with po Abx and instructions to f/u with OMFS.  Pt came to [facility] ED when the swelling recurred.    CT images reviewed.  There appears to be communication between her poor dentition and the abscess cavity.      OMFS consulted.  We will plan on joint exploration tomorrow since pt just ate.  Will treat with IV Abx until surgical intervention can be completed.

## 2023-01-11 NOTE — PATIENT PROFILE ADULT - NSPROPTRIGHTREPNAME_GEN_A__NUR
Update: (This section must be completed if the H&P was completed greater than 24 hrs to procedure or admission)    H&P reviewed  After examining the patient, I find no changed to the H&P since it had been written  77M w/ lung mass, nondiagnostic biopsies, atrial fibrillation on anticoagulation, recent COPD exacerbation resulting in AFib/RVR with ICU admission    Now clinically improved respiratory status marginal but adequate for biopsy    Reviewed plan for tissue sampling with the pulmonary service  We will target the right-sided lesion as he needs a tissue diagnosis    I discussed the technique risks benefits and alternatives with the patient  He is now on amiodarone  I discussed elevated risk of bleeding in the setting of blood thinners, heparin has been held    He wishes to proceed, we will give sedation  Mp2 ASA3      Patient re-evaluated   Accept as history and physical     Garrett Paulino MD/July 21, 2021/8:55 AM Charley Barr

## 2023-01-11 NOTE — PATIENT PROFILE ADULT - NSPROMEDSADMININFO_GEN_A_NUR
OFFICE VISIT      Patient: Keith Robert Date of Service: 2019   : 1990 MRN: [id number]     SUBJECTIVE:   HISTORY OF PRESENT ILLNESS:  Keith Robert is a 29 year old male who presents today for cough and PND after 3 weeks symptoms - no fever no ear pain no rash no sob/schmid or wheeze no vaping or TOB no recent travel no h/o asthma notes phlegm   Had some improvement with tessalon but stopped medication.   Notes some am nausea wo GI change        PAST MEDICAL HISTORY:  Past Medical History:   Diagnosis Date   • No known problems        MEDICATIONS:  Current Outpatient Medications   Medication Sig   • predniSONE (DELTASONE) 10 MG tablet Take 2 tablets by mouth daily.   • benzonatate (TESSALON PERLES) 100 MG capsule Take 1 capsule by mouth 3 times daily as needed for Cough.   • benzonatate (TESSALON PERLES) 100 MG capsule 1-2 capsules PO q 8 hours as needed for cough   • fluticasone (FLONASE) 50 MCG/ACT nasal spray 2 sprays to each nare daily x 1 week, then 1 spray each nare daily for 1 more week  (total therapy 14 days)     No current facility-administered medications for this visit.        ALLERGIES:  ALLERGIES:  No Known Allergies    PAST SURGICAL HISTORY:  Past Surgical History:   Procedure Laterality Date   • No past surgeries         FAMILY HISTORY:  History reviewed. No pertinent family history.    SOCIAL HISTORY:  Social History     Tobacco Use   • Smoking status: Never Smoker   • Smokeless tobacco: Never Used   Substance Use Topics   • Alcohol use: Not on file   • Drug use: Not on file       Review of Systems   Constitutional: Negative for activity change, appetite change, chills, diaphoresis, fatigue, fever and unexpected weight change.   HENT: Positive for congestion, postnasal drip and rhinorrhea. Negative for ear discharge, ear pain, facial swelling, hearing loss, mouth sores, nosebleeds, sinus pressure, sinus pain, sneezing, sore throat, tinnitus and voice change.    Eyes: Negative.     Respiratory: Positive for cough. Negative for apnea, choking, chest tightness, shortness of breath, wheezing and stridor.    Cardiovascular: Negative.    Gastrointestinal: Positive for nausea.   Genitourinary: Negative.    Musculoskeletal: Negative.    Neurological: Negative.          OBJECTIVE:     Visit Vitals  /70 (BP Location: LUE - Left upper extremity, Patient Position: Sitting, Cuff Size: Regular)   Pulse 92   Temp 98.8 °F (37.1 °C) (Oral)   Resp 12   Ht 5' 10\" (1.778 m)   Wt 91.8 kg (202 lb 7.9 oz)   SpO2 100%   BMI 29.05 kg/m²       Physical Exam   Constitutional: He is oriented to person, place, and time. He appears well-developed and well-nourished.   HENT:   Head: Normocephalic and atraumatic.   Right Ear: External ear normal.   Left Ear: External ear normal.   Nose: Nose normal.   Mouth/Throat: Oropharynx is clear and moist. No oropharyngeal exudate.   Eyes: Pupils are equal, round, and reactive to light. Conjunctivae and EOM are normal.   Neck: Normal range of motion. Neck supple.   Cardiovascular: Normal rate, regular rhythm and normal heart sounds.   No murmur heard.  Pulmonary/Chest: Effort normal and breath sounds normal. No respiratory distress. He has no wheezes. He has no rales. He exhibits no tenderness. Musculoskeletal: Normal range of motion.     Lymphadenopathy:     He has no cervical adenopathy.   Neurological: He is alert and oriented to person, place, and time. He has normal reflexes.   Skin: Skin is warm and dry.   Psychiatric: He has a normal mood and affect. His behavior is normal. Judgment and thought content normal.                 Assessment AND PLAN:       1. Acute nasopharyngitis      Advised supportive measures  Reviewed viral v bacterial and s/sx to monitor for re-evaluation  Pt understands and agrees with plan    Orders Placed This Encounter   • predniSONE (DELTASONE) 10 MG tablet   • benzonatate (TESSALON PERLES) 100 MG capsule   • benzonatate (TESSALON PERLES) 100 MG  capsule       No follow-ups on file.    Instructions provided as documented in the AVS.   no concerns

## 2023-01-12 ENCOUNTER — TRANSCRIPTION ENCOUNTER (OUTPATIENT)
Age: 48
End: 2023-01-12

## 2023-01-12 LAB
ALBUMIN SERPL ELPH-MCNC: 2.8 G/DL — LOW (ref 3.3–5)
ALP SERPL-CCNC: 59 U/L — SIGNIFICANT CHANGE UP (ref 40–120)
ALT FLD-CCNC: 9 U/L — SIGNIFICANT CHANGE UP (ref 4–33)
ANION GAP SERPL CALC-SCNC: 10 MMOL/L — SIGNIFICANT CHANGE UP (ref 7–14)
APTT BLD: 33 SEC — SIGNIFICANT CHANGE UP (ref 27–36.3)
AST SERPL-CCNC: 14 U/L — SIGNIFICANT CHANGE UP (ref 4–32)
BASOPHILS # BLD AUTO: 0.04 K/UL — SIGNIFICANT CHANGE UP (ref 0–0.2)
BASOPHILS NFR BLD AUTO: 0.7 % — SIGNIFICANT CHANGE UP (ref 0–2)
BILIRUB SERPL-MCNC: <0.2 MG/DL — SIGNIFICANT CHANGE UP (ref 0.2–1.2)
BLD GP AB SCN SERPL QL: NEGATIVE — SIGNIFICANT CHANGE UP
BUN SERPL-MCNC: 14 MG/DL — SIGNIFICANT CHANGE UP (ref 7–23)
CALCIUM SERPL-MCNC: 7.9 MG/DL — LOW (ref 8.4–10.5)
CHLORIDE SERPL-SCNC: 105 MMOL/L — SIGNIFICANT CHANGE UP (ref 98–107)
CO2 SERPL-SCNC: 23 MMOL/L — SIGNIFICANT CHANGE UP (ref 22–31)
CREAT SERPL-MCNC: 0.94 MG/DL — SIGNIFICANT CHANGE UP (ref 0.5–1.3)
EGFR: 75 ML/MIN/1.73M2 — SIGNIFICANT CHANGE UP
EOSINOPHIL # BLD AUTO: 0.16 K/UL — SIGNIFICANT CHANGE UP (ref 0–0.5)
EOSINOPHIL NFR BLD AUTO: 2.9 % — SIGNIFICANT CHANGE UP (ref 0–6)
FOLATE SERPL-MCNC: 4.7 NG/ML — SIGNIFICANT CHANGE UP (ref 3.1–17.5)
GLUCOSE SERPL-MCNC: 86 MG/DL — SIGNIFICANT CHANGE UP (ref 70–99)
HCT VFR BLD CALC: 29.9 % — LOW (ref 34.5–45)
HGB BLD-MCNC: 9.9 G/DL — LOW (ref 11.5–15.5)
IANC: 3.5 K/UL — SIGNIFICANT CHANGE UP (ref 1.8–7.4)
IMM GRANULOCYTES NFR BLD AUTO: 2 % — HIGH (ref 0–0.9)
INR BLD: 1.02 RATIO — SIGNIFICANT CHANGE UP (ref 0.88–1.16)
LYMPHOCYTES # BLD AUTO: 1.54 K/UL — SIGNIFICANT CHANGE UP (ref 1–3.3)
LYMPHOCYTES # BLD AUTO: 27.5 % — SIGNIFICANT CHANGE UP (ref 13–44)
MCHC RBC-ENTMCNC: 33.1 GM/DL — SIGNIFICANT CHANGE UP (ref 32–36)
MCHC RBC-ENTMCNC: 35.5 PG — HIGH (ref 27–34)
MCV RBC AUTO: 107.2 FL — HIGH (ref 80–100)
MONOCYTES # BLD AUTO: 0.24 K/UL — SIGNIFICANT CHANGE UP (ref 0–0.9)
MONOCYTES NFR BLD AUTO: 4.3 % — SIGNIFICANT CHANGE UP (ref 2–14)
NEUTROPHILS # BLD AUTO: 3.5 K/UL — SIGNIFICANT CHANGE UP (ref 1.8–7.4)
NEUTROPHILS NFR BLD AUTO: 62.6 % — SIGNIFICANT CHANGE UP (ref 43–77)
NIGHT BLUE STAIN TISS: SIGNIFICANT CHANGE UP
NRBC # BLD: 0 /100 WBCS — SIGNIFICANT CHANGE UP (ref 0–0)
NRBC # FLD: 0 K/UL — SIGNIFICANT CHANGE UP (ref 0–0)
PLATELET # BLD AUTO: 255 K/UL — SIGNIFICANT CHANGE UP (ref 150–400)
POTASSIUM SERPL-MCNC: 3.6 MMOL/L — SIGNIFICANT CHANGE UP (ref 3.5–5.3)
POTASSIUM SERPL-SCNC: 3.6 MMOL/L — SIGNIFICANT CHANGE UP (ref 3.5–5.3)
PROT SERPL-MCNC: 5.6 G/DL — LOW (ref 6–8.3)
PROTHROM AB SERPL-ACNC: 11.8 SEC — SIGNIFICANT CHANGE UP (ref 10.5–13.4)
RBC # BLD: 2.79 M/UL — LOW (ref 3.8–5.2)
RBC # FLD: 12.3 % — SIGNIFICANT CHANGE UP (ref 10.3–14.5)
RH IG SCN BLD-IMP: POSITIVE — SIGNIFICANT CHANGE UP
SODIUM SERPL-SCNC: 138 MMOL/L — SIGNIFICANT CHANGE UP (ref 135–145)
SPECIMEN SOURCE: SIGNIFICANT CHANGE UP
T3 SERPL-MCNC: 44 NG/DL — LOW (ref 80–200)
T4 FREE SERPL-MCNC: <0.3 NG/DL — LOW (ref 0.9–1.8)
TSH SERPL-MCNC: 36.67 UIU/ML — HIGH (ref 0.27–4.2)
VIT B12 SERPL-MCNC: 534 PG/ML — SIGNIFICANT CHANGE UP (ref 200–900)
WBC # BLD: 5.59 K/UL — SIGNIFICANT CHANGE UP (ref 3.8–10.5)
WBC # FLD AUTO: 5.59 K/UL — SIGNIFICANT CHANGE UP (ref 3.8–10.5)

## 2023-01-12 PROCEDURE — 41009 DRAINAGE OF MOUTH LESION: CPT | Mod: GC,58

## 2023-01-12 PROCEDURE — 40801 DRAINAGE OF MOUTH LESION: CPT | Mod: GC,58

## 2023-01-12 PROCEDURE — 99233 SBSQ HOSP IP/OBS HIGH 50: CPT

## 2023-01-12 DEVICE — SURGIFOAM PAD 8CM X 12.5CM X 2MM (100C): Type: IMPLANTABLE DEVICE | Site: LEFT | Status: FUNCTIONAL

## 2023-01-12 RX ORDER — ONDANSETRON 8 MG/1
4 TABLET, FILM COATED ORAL ONCE
Refills: 0 | Status: DISCONTINUED | OUTPATIENT
Start: 2023-01-12 | End: 2023-01-12

## 2023-01-12 RX ORDER — MORPHINE SULFATE 50 MG/1
4 CAPSULE, EXTENDED RELEASE ORAL EVERY 4 HOURS
Refills: 0 | Status: DISCONTINUED | OUTPATIENT
Start: 2023-01-12 | End: 2023-01-14

## 2023-01-12 RX ORDER — HYDROMORPHONE HYDROCHLORIDE 2 MG/ML
0.5 INJECTION INTRAMUSCULAR; INTRAVENOUS; SUBCUTANEOUS
Refills: 0 | Status: DISCONTINUED | OUTPATIENT
Start: 2023-01-12 | End: 2023-01-12

## 2023-01-12 RX ORDER — MORPHINE SULFATE 50 MG/1
2 CAPSULE, EXTENDED RELEASE ORAL EVERY 4 HOURS
Refills: 0 | Status: DISCONTINUED | OUTPATIENT
Start: 2023-01-12 | End: 2023-01-12

## 2023-01-12 RX ORDER — LEVOTHYROXINE SODIUM 125 MCG
150 TABLET ORAL DAILY
Refills: 0 | Status: DISCONTINUED | OUTPATIENT
Start: 2023-01-13 | End: 2023-01-13

## 2023-01-12 RX ORDER — MORPHINE SULFATE 50 MG/1
2 CAPSULE, EXTENDED RELEASE ORAL EVERY 4 HOURS
Refills: 0 | Status: DISCONTINUED | OUTPATIENT
Start: 2023-01-12 | End: 2023-01-14

## 2023-01-12 RX ADMIN — CHLORHEXIDINE GLUCONATE 15 MILLILITER(S): 213 SOLUTION TOPICAL at 06:26

## 2023-01-12 RX ADMIN — AMPICILLIN SODIUM AND SULBACTAM SODIUM 200 GRAM(S): 250; 125 INJECTION, POWDER, FOR SUSPENSION INTRAMUSCULAR; INTRAVENOUS at 00:13

## 2023-01-12 RX ADMIN — AMPICILLIN SODIUM AND SULBACTAM SODIUM 200 GRAM(S): 250; 125 INJECTION, POWDER, FOR SUSPENSION INTRAMUSCULAR; INTRAVENOUS at 21:57

## 2023-01-12 RX ADMIN — Medication 15 MILLIGRAM(S): at 17:23

## 2023-01-12 RX ADMIN — HYDROMORPHONE HYDROCHLORIDE 0.5 MILLIGRAM(S): 2 INJECTION INTRAMUSCULAR; INTRAVENOUS; SUBCUTANEOUS at 10:45

## 2023-01-12 RX ADMIN — Medication 100 MICROGRAM(S): at 06:27

## 2023-01-12 RX ADMIN — MORPHINE SULFATE 4 MILLIGRAM(S): 50 CAPSULE, EXTENDED RELEASE ORAL at 17:38

## 2023-01-12 RX ADMIN — FAMOTIDINE 20 MILLIGRAM(S): 10 INJECTION INTRAVENOUS at 13:04

## 2023-01-12 RX ADMIN — MORPHINE SULFATE 4 MILLIGRAM(S): 50 CAPSULE, EXTENDED RELEASE ORAL at 23:06

## 2023-01-12 RX ADMIN — MORPHINE SULFATE 4 MILLIGRAM(S): 50 CAPSULE, EXTENDED RELEASE ORAL at 12:57

## 2023-01-12 RX ADMIN — AMPICILLIN SODIUM AND SULBACTAM SODIUM 200 GRAM(S): 250; 125 INJECTION, POWDER, FOR SUSPENSION INTRAMUSCULAR; INTRAVENOUS at 06:24

## 2023-01-12 RX ADMIN — MORPHINE SULFATE 4 MILLIGRAM(S): 50 CAPSULE, EXTENDED RELEASE ORAL at 23:45

## 2023-01-12 RX ADMIN — SERTRALINE 50 MILLIGRAM(S): 25 TABLET, FILM COATED ORAL at 21:57

## 2023-01-12 RX ADMIN — QUETIAPINE FUMARATE 50 MILLIGRAM(S): 200 TABLET, FILM COATED ORAL at 21:57

## 2023-01-12 RX ADMIN — GABAPENTIN 200 MILLIGRAM(S): 400 CAPSULE ORAL at 21:57

## 2023-01-12 RX ADMIN — AMPICILLIN SODIUM AND SULBACTAM SODIUM 200 GRAM(S): 250; 125 INJECTION, POWDER, FOR SUSPENSION INTRAMUSCULAR; INTRAVENOUS at 15:20

## 2023-01-12 RX ADMIN — MORPHINE SULFATE 4 MILLIGRAM(S): 50 CAPSULE, EXTENDED RELEASE ORAL at 17:23

## 2023-01-12 RX ADMIN — MORPHINE SULFATE 4 MILLIGRAM(S): 50 CAPSULE, EXTENDED RELEASE ORAL at 13:12

## 2023-01-12 RX ADMIN — GABAPENTIN 200 MILLIGRAM(S): 400 CAPSULE ORAL at 13:02

## 2023-01-12 RX ADMIN — CHLORHEXIDINE GLUCONATE 15 MILLILITER(S): 213 SOLUTION TOPICAL at 17:23

## 2023-01-12 RX ADMIN — HYDROMORPHONE HYDROCHLORIDE 0.5 MILLIGRAM(S): 2 INJECTION INTRAMUSCULAR; INTRAVENOUS; SUBCUTANEOUS at 10:30

## 2023-01-12 RX ADMIN — GABAPENTIN 200 MILLIGRAM(S): 400 CAPSULE ORAL at 06:24

## 2023-01-12 RX ADMIN — Medication 15 MILLIGRAM(S): at 06:26

## 2023-01-12 NOTE — ASU PREOP CHECKLIST - PATIENT PROBLEMS/NEEDS
The patient called the [facility] with the following complaint:    Radha King     1984     [id number]     4/8/2020     [phone] (home)      Caller:       Consultation (left nipple discharge)        Imaging performed and where:  Last imaging 2016 left breast ultrasound and aspiration        Additional Comments: History of Left breast infectious symptoms and drainage of pus from aspiration site (2015) per Estuardo Bowser NP last note from 8/25/2016. Complaining of the same concerns as she had back in 2016.  Left breast nipple drainage, yellowish per patient, does note some malodorous smell to the drainage.  No complaints of redness, swelling to her breast, no lumps in her breast.  Just chronic nipple drainage per patient that she has had for years.  Patient is wondering if she needs antibiotics.  Patient back in 2016 had a lot of things going on in her personal life and wasn't able to follow up appropriately for her breast health.  She is now wanting to take care of her concerns as she is in a better place in her life.        Provider: Estuardo Bowser NP    Call placed to patient - went to voice mail. No message left.    Called Melodie RN and recommend that as patient called [facility] and seen there by Dr. Mack in 2015, and as I only saw the patient once nearly 4 years ago and she never kept recommended follow-up appointments, the on-call MD at [facility] would be best for follow-up of this patient. It is unclear why she is now ready to deal with a chronic problem in the midst of the COVID crisis. Perhaps she can be seen in the future when this is improved.   Patient expressed no known problems or needs

## 2023-01-12 NOTE — PROGRESS NOTE ADULT - PROBLEM SELECTOR PLAN 1
ENT, dental and OMFS consulted  -s/p I&D of abscess and tooth extraction 1/12  -penrose drain in place  -soft diet  -previous abscess culture grew strep anginosus and Prevotella denticola  -c/w Unasyn empirically  -f/u blood cultures  -c/w Peridex swish and spit BID  -pain control with morphine PRN, Toradol 15mg IV q8h x3 days

## 2023-01-12 NOTE — BRIEF OPERATIVE NOTE - COMMENTS
Sutures are dissolvable  Soft diet  bite on gauze if oozing from extraction sites
Bite on gauze for oozing  Soft diet  sutures are dissolvable

## 2023-01-12 NOTE — PROGRESS NOTE ADULT - SUBJECTIVE AND OBJECTIVE BOX
Kandice Denny  [facility] Medicine  Pager #[phone]  Available on [employer] Teams    Patient is a 47y old  Female who presents with a chief complaint of abscess (12 Jan 2023 11:58)      SUBJECTIVE / OVERNIGHT EVENTS: Patient seen and examined at bedside. Pt's mother at bedside. Pt s/p I&D of facial abscess and tooth extraction this AM. Pt c/o severe pain, waiting to get morphine.    ADDITIONAL REVIEW OF SYSTEMS:    MEDICATIONS  (STANDING):  ampicillin/sulbactam  IVPB      ampicillin/sulbactam  IVPB 3 Gram(s) IV Intermittent every 6 hours  chlorhexidine 0.12% Liquid 15 milliLiter(s) Swish and Spit two times a day  famotidine    Tablet 20 milliGRAM(s) Oral daily  gabapentin 200 milliGRAM(s) Oral every 8 hours  influenza   Vaccine 0.5 milliLiter(s) IntraMuscular once  ketorolac   Injectable 15 milliGRAM(s) IV Push every 8 hours  QUEtiapine 50 milliGRAM(s) Oral daily  sertraline 50 milliGRAM(s) Oral daily    MEDICATIONS  (PRN):  morphine  - Injectable 4 milliGRAM(s) IV Push every 4 hours PRN Severe Pain (7 - 10)  morphine  - Injectable 2 milliGRAM(s) IV Push every 4 hours PRN Moderate Pain (4 - 6)      CAPILLARY BLOOD GLUCOSE        I&O's Summary      PHYSICAL EXAM:    Vital Signs Last 24 Hrs  T(C): 36.5 (12 Jan 2023 11:00), Max: 36.7 (11 Jan 2023 16:19)  T(F): 97.7 (12 Jan 2023 11:00), Max: 98.1 (11 Jan 2023 21:21)  HR: 66 (12 Jan 2023 12:15) (61 - 74)  BP: 120/79 (12 Jan 2023 12:15) (106/60 - 124/78)  BP(mean): 88 (12 Jan 2023 12:15) (71 - 89)  RR: 18 (12 Jan 2023 12:15) (11 - 18)  SpO2: 94% (12 Jan 2023 12:15) (94% - 100%)    Parameters below as of 12 Jan 2023 12:15  Patient On (Oxygen Delivery Method): room air    CONSTITUTIONAL: NAD, well-developed, well-groomed  EYES: Conjunctiva and sclera clear  ENMT: Moist oral mucosa, poor dentition, multiple dental caries; L cheek swelling, dried blood  NECK: Supple  RESPIRATORY: Normal respiratory effort; lungs are clear to auscultation bilaterally  CARDIOVASCULAR: Regular rate and rhythm, normal S1 and S2, no murmur/rub/gallop; No lower extremity edema  ABDOMEN: Soft, nontender to palpation, normoactive bowel sounds  PSYCH: A+O to person, place, and time  SKIN: Left facial erythema    LABS:                        9.9    5.59  )-----------( 255      ( 12 Jan 2023 06:15 )             29.9     01-12    138  |  105  |  14  ----------------------------<  86  3.6   |  23  |  0.94    Ca    7.9<L>      12 Jan 2023 06:15    TPro  5.6<L>  /  Alb  2.8<L>  /  TBili  <0.2  /  DBili  x   /  AST  14  /  ALT  9   /  AlkPhos  59  01-12    PT/INR - ( 12 Jan 2023 06:15 )   PT: 11.8 sec;   INR: 1.02 ratio         PTT - ( 12 Jan 2023 06:15 )  PTT:33.0 sec      Culture - Blood (collected 10 Chaz 2023 19:55)  Source: .Blood Blood-Peripheral  Preliminary Report (12 Jan 2023 01:02):    No growth to date.    Culture - Blood (collected 10 Chaz 2023 19:30)  Source: .Blood Blood-Venous  Preliminary Report (12 Jan 2023 01:02):    No growth to date.      RADIOLOGY & ADDITIONAL TESTS: No new imaging  Results Reviewed: Yes  Imaging Personally Reviewed:  Electrocardiogram Personally Reviewed:    COORDINATION OF CARE:  Care Discussed with Consultants/Other Providers [Y/N]:  Prior or Outpatient Records Reviewed [Y/N]:

## 2023-01-12 NOTE — CHART NOTE - NSCHARTNOTEFT_GEN_A_CORE
OMFS Post op Check    Patient is now s/p ext of #2,3,4,13,19 in the OR with Dr. Stinson and the ENT team. Procedure went well without complication  patient tolerated procedure well    1/12/22  patient visited bedside, resting comfortably. AVSS. no acute events in the post operative period. patient reports that she is doing signficantly better and feeling significantly better. patient denies pain, discomfort, trouble breathing or trouble swallowing.     Vital Signs Last 24 Hrs  T(C): 37.1 (12 Jan 2023 17:37), Max: 37.1 (12 Jan 2023 17:37)  T(F): 98.7 (12 Jan 2023 17:37), Max: 98.7 (12 Jan 2023 17:37)  HR: 74 (12 Jan 2023 17:37) (61 - 74)  BP: 125/81 (12 Jan 2023 17:37) (106/60 - 125/81)  BP(mean): 88 (12 Jan 2023 12:15) (71 - 89)  RR: 18 (12 Jan 2023 17:37) (11 - 18)  SpO2: 94% (12 Jan 2023 17:37) (94% - 100%)    Parameters below as of 12 Jan 2023 17:37  Patient On (Oxygen Delivery Method): room air      EOE: significantly reduced swelling. no edema or erythema present. no LAD or indurations. no fever.    IOE: extraction sites hemostatic. sutures are clean closed and in tact. reduced intraoral swelling noted. no signs of edema, erythema or draining infection. sites are hemostatic.   FOM soft.     Assessment:  patient healing well along normal post procedural course.    plan:  - no further OMFS intervention noted  - please reconsult OMFS as needed  - discharge on peridex BID    - please follow up with [facility] OMFS in 1 week  please call [phone] for appointment    [facility]   OMFS/ Dental clinic: 1st floor  [phone] [place] [place] [address] [phone]

## 2023-01-12 NOTE — PROGRESS NOTE ADULT - SUBJECTIVE AND OBJECTIVE BOX
Brief ENT Note    Patient went to OR for tooth extraction with OMFS and intra-oral I&D of facial abscess with expression of murky drainage. Penrose placed intra-orally    - keep penrose drain in place, will be removed in 1-2 days  - fu OR cultures  - fu OMFS for recommendations regarding tooth extraction  - continue IV per medicine service, currently on unasyn   - ENT will follow, page with questions

## 2023-01-13 LAB
ALBUMIN SERPL ELPH-MCNC: 2.9 G/DL — LOW (ref 3.3–5)
ALP SERPL-CCNC: 59 U/L — SIGNIFICANT CHANGE UP (ref 40–120)
ALT FLD-CCNC: 8 U/L — SIGNIFICANT CHANGE UP (ref 4–33)
ANION GAP SERPL CALC-SCNC: 7 MMOL/L — SIGNIFICANT CHANGE UP (ref 7–14)
AST SERPL-CCNC: 14 U/L — SIGNIFICANT CHANGE UP (ref 4–32)
BASOPHILS # BLD AUTO: 0.04 K/UL — SIGNIFICANT CHANGE UP (ref 0–0.2)
BASOPHILS NFR BLD AUTO: 0.5 % — SIGNIFICANT CHANGE UP (ref 0–2)
BILIRUB SERPL-MCNC: <0.2 MG/DL — SIGNIFICANT CHANGE UP (ref 0.2–1.2)
BUN SERPL-MCNC: 10 MG/DL — SIGNIFICANT CHANGE UP (ref 7–23)
CALCIUM SERPL-MCNC: 7.9 MG/DL — LOW (ref 8.4–10.5)
CHLORIDE SERPL-SCNC: 105 MMOL/L — SIGNIFICANT CHANGE UP (ref 98–107)
CO2 SERPL-SCNC: 26 MMOL/L — SIGNIFICANT CHANGE UP (ref 22–31)
CREAT SERPL-MCNC: 0.95 MG/DL — SIGNIFICANT CHANGE UP (ref 0.5–1.3)
EGFR: 74 ML/MIN/1.73M2 — SIGNIFICANT CHANGE UP
EOSINOPHIL # BLD AUTO: 0.09 K/UL — SIGNIFICANT CHANGE UP (ref 0–0.5)
EOSINOPHIL NFR BLD AUTO: 1.2 % — SIGNIFICANT CHANGE UP (ref 0–6)
GLUCOSE SERPL-MCNC: 102 MG/DL — HIGH (ref 70–99)
HCT VFR BLD CALC: 27.9 % — LOW (ref 34.5–45)
HGB BLD-MCNC: 9.2 G/DL — LOW (ref 11.5–15.5)
IANC: 5.43 K/UL — SIGNIFICANT CHANGE UP (ref 1.8–7.4)
IMM GRANULOCYTES NFR BLD AUTO: 0.9 % — SIGNIFICANT CHANGE UP (ref 0–0.9)
INR BLD: 0.95 RATIO — SIGNIFICANT CHANGE UP (ref 0.88–1.16)
LYMPHOCYTES # BLD AUTO: 1.55 K/UL — SIGNIFICANT CHANGE UP (ref 1–3.3)
LYMPHOCYTES # BLD AUTO: 20.6 % — SIGNIFICANT CHANGE UP (ref 13–44)
MCHC RBC-ENTMCNC: 33 GM/DL — SIGNIFICANT CHANGE UP (ref 32–36)
MCHC RBC-ENTMCNC: 36.1 PG — HIGH (ref 27–34)
MCV RBC AUTO: 109.4 FL — HIGH (ref 80–100)
MONOCYTES # BLD AUTO: 0.35 K/UL — SIGNIFICANT CHANGE UP (ref 0–0.9)
MONOCYTES NFR BLD AUTO: 4.6 % — SIGNIFICANT CHANGE UP (ref 2–14)
NEUTROPHILS # BLD AUTO: 5.43 K/UL — SIGNIFICANT CHANGE UP (ref 1.8–7.4)
NEUTROPHILS NFR BLD AUTO: 72.2 % — SIGNIFICANT CHANGE UP (ref 43–77)
NRBC # BLD: 0 /100 WBCS — SIGNIFICANT CHANGE UP (ref 0–0)
NRBC # FLD: 0 K/UL — SIGNIFICANT CHANGE UP (ref 0–0)
PLATELET # BLD AUTO: 235 K/UL — SIGNIFICANT CHANGE UP (ref 150–400)
POTASSIUM SERPL-MCNC: 3.3 MMOL/L — LOW (ref 3.5–5.3)
POTASSIUM SERPL-SCNC: 3.3 MMOL/L — LOW (ref 3.5–5.3)
PROT SERPL-MCNC: 5.5 G/DL — LOW (ref 6–8.3)
PROTHROM AB SERPL-ACNC: 11 SEC — SIGNIFICANT CHANGE UP (ref 10.5–13.4)
RBC # BLD: 2.55 M/UL — LOW (ref 3.8–5.2)
RBC # FLD: 12.4 % — SIGNIFICANT CHANGE UP (ref 10.3–14.5)
SODIUM SERPL-SCNC: 138 MMOL/L — SIGNIFICANT CHANGE UP (ref 135–145)
WBC # BLD: 7.53 K/UL — SIGNIFICANT CHANGE UP (ref 3.8–10.5)
WBC # FLD AUTO: 7.53 K/UL — SIGNIFICANT CHANGE UP (ref 3.8–10.5)

## 2023-01-13 PROCEDURE — 99232 SBSQ HOSP IP/OBS MODERATE 35: CPT

## 2023-01-13 RX ORDER — CHLORHEXIDINE GLUCONATE 213 G/1000ML
15 SOLUTION TOPICAL
Refills: 0 | Status: DISCONTINUED | OUTPATIENT
Start: 2023-01-13 | End: 2023-01-17

## 2023-01-13 RX ORDER — POTASSIUM CHLORIDE 20 MEQ
40 PACKET (EA) ORAL EVERY 6 HOURS
Refills: 0 | Status: COMPLETED | OUTPATIENT
Start: 2023-01-13 | End: 2023-01-13

## 2023-01-13 RX ORDER — LEVOTHYROXINE SODIUM 125 MCG
90 TABLET ORAL
Refills: 0 | Status: DISCONTINUED | OUTPATIENT
Start: 2023-01-14 | End: 2023-01-27

## 2023-01-13 RX ORDER — OXYCODONE HYDROCHLORIDE 5 MG/1
10 TABLET ORAL EVERY 6 HOURS
Refills: 0 | Status: DISCONTINUED | OUTPATIENT
Start: 2023-01-13 | End: 2023-01-14

## 2023-01-13 RX ADMIN — GABAPENTIN 200 MILLIGRAM(S): 400 CAPSULE ORAL at 21:14

## 2023-01-13 RX ADMIN — AMPICILLIN SODIUM AND SULBACTAM SODIUM 200 GRAM(S): 250; 125 INJECTION, POWDER, FOR SUSPENSION INTRAMUSCULAR; INTRAVENOUS at 09:35

## 2023-01-13 RX ADMIN — GABAPENTIN 200 MILLIGRAM(S): 400 CAPSULE ORAL at 05:33

## 2023-01-13 RX ADMIN — MORPHINE SULFATE 4 MILLIGRAM(S): 50 CAPSULE, EXTENDED RELEASE ORAL at 12:39

## 2023-01-13 RX ADMIN — Medication 15 MILLIGRAM(S): at 17:46

## 2023-01-13 RX ADMIN — SERTRALINE 50 MILLIGRAM(S): 25 TABLET, FILM COATED ORAL at 21:14

## 2023-01-13 RX ADMIN — MORPHINE SULFATE 4 MILLIGRAM(S): 50 CAPSULE, EXTENDED RELEASE ORAL at 23:13

## 2023-01-13 RX ADMIN — MORPHINE SULFATE 4 MILLIGRAM(S): 50 CAPSULE, EXTENDED RELEASE ORAL at 08:34

## 2023-01-13 RX ADMIN — MORPHINE SULFATE 4 MILLIGRAM(S): 50 CAPSULE, EXTENDED RELEASE ORAL at 04:25

## 2023-01-13 RX ADMIN — Medication 15 MILLIGRAM(S): at 02:22

## 2023-01-13 RX ADMIN — CHLORHEXIDINE GLUCONATE 15 MILLILITER(S): 213 SOLUTION TOPICAL at 17:46

## 2023-01-13 RX ADMIN — FAMOTIDINE 20 MILLIGRAM(S): 10 INJECTION INTRAVENOUS at 12:38

## 2023-01-13 RX ADMIN — AMPICILLIN SODIUM AND SULBACTAM SODIUM 200 GRAM(S): 250; 125 INJECTION, POWDER, FOR SUSPENSION INTRAMUSCULAR; INTRAVENOUS at 14:54

## 2023-01-13 RX ADMIN — MORPHINE SULFATE 4 MILLIGRAM(S): 50 CAPSULE, EXTENDED RELEASE ORAL at 13:30

## 2023-01-13 RX ADMIN — QUETIAPINE FUMARATE 50 MILLIGRAM(S): 200 TABLET, FILM COATED ORAL at 21:15

## 2023-01-13 RX ADMIN — MORPHINE SULFATE 4 MILLIGRAM(S): 50 CAPSULE, EXTENDED RELEASE ORAL at 09:30

## 2023-01-13 RX ADMIN — Medication 150 MICROGRAM(S): at 05:33

## 2023-01-13 RX ADMIN — Medication 15 MILLIGRAM(S): at 09:35

## 2023-01-13 RX ADMIN — CHLORHEXIDINE GLUCONATE 15 MILLILITER(S): 213 SOLUTION TOPICAL at 05:28

## 2023-01-13 RX ADMIN — MORPHINE SULFATE 4 MILLIGRAM(S): 50 CAPSULE, EXTENDED RELEASE ORAL at 03:32

## 2023-01-13 RX ADMIN — Medication 40 MILLIEQUIVALENT(S): at 17:46

## 2023-01-13 RX ADMIN — Medication 40 MILLIEQUIVALENT(S): at 12:39

## 2023-01-13 RX ADMIN — AMPICILLIN SODIUM AND SULBACTAM SODIUM 200 GRAM(S): 250; 125 INJECTION, POWDER, FOR SUSPENSION INTRAMUSCULAR; INTRAVENOUS at 21:15

## 2023-01-13 RX ADMIN — GABAPENTIN 200 MILLIGRAM(S): 400 CAPSULE ORAL at 14:54

## 2023-01-13 RX ADMIN — MORPHINE SULFATE 4 MILLIGRAM(S): 50 CAPSULE, EXTENDED RELEASE ORAL at 17:46

## 2023-01-13 RX ADMIN — AMPICILLIN SODIUM AND SULBACTAM SODIUM 200 GRAM(S): 250; 125 INJECTION, POWDER, FOR SUSPENSION INTRAMUSCULAR; INTRAVENOUS at 04:08

## 2023-01-13 RX ADMIN — MORPHINE SULFATE 4 MILLIGRAM(S): 50 CAPSULE, EXTENDED RELEASE ORAL at 18:30

## 2023-01-13 NOTE — PROGRESS NOTE ADULT - ASSESSMENT
47y Female with PMH of depression, anxiety, and thyroid cancer s/p left partial thyroidectomy (1996) and central neck dissection (5/22) with recent admission at [facility] for left facial abscess p/w recurrent abscess, failed output PO abx.

## 2023-01-13 NOTE — CHART NOTE - NSCHARTNOTEFT_GEN_A_CORE
Endocrine consulted for post op hypothyroidism.   Patient recently admitted for left facial abscess, now readmitted for recurrent abscess.   From chart review of outpatient records, she has a hx of Papillary Thyroid CA s/p L. thyroidectomy in 1990s, followed by completion thyroidectomy in 2010 as well central neck dissection in May 2022. Pathology from central neck mass excision in May 2022 revealed thyroid tissue with adenomatoid nodules and no evidence of recurrent thyroid cancer per review of path report & outpt surg onc documentation.   Thyroid Stimulating Hormone, Serum: 36.67 uIU/mL (01.12.23 @ 06:15) - elevated   Free Thyroxine, Serum: <0.3 ng/dL (01.12.23 @ 06:15) - low   Triiodothyronine, Total (T3 Total): 44 ng/dL (01.12.23 @ 06:15) - low   Vital Signs Last 24 Hrs  T(C): 36.6 (13 Jan 2023 09:22), Max: 37.1 (12 Jan 2023 17:37)  T(F): 97.9 (13 Jan 2023 09:22), Max: 98.7 (12 Jan 2023 17:37)  HR: 70 (13 Jan 2023 09:22) (64 - 82)  BP: 118/64 (13 Jan 2023 09:22) (100/63 - 130/87)  BP(mean): 88 (12 Jan 2023 12:15) (80 - 88)  RR: 18 (13 Jan 2023 09:22) (14 - 18)  SpO2: 100% (13 Jan 2023 09:22) (94% - 100%)    Parameters below as of 13 Jan 2023 09:22  Patient On (Oxygen Delivery Method): room air    01-13    138  |  105  |  10  ----------------------------<  102<H>  3.3<L>   |  26  |  0.95    Ca    7.9<L>      13 Jan 2023 06:18    TPro  5.5<L>  /  Alb  2.9<L>  /  TBili  <0.2  /  DBili  x   /  AST  14  /  ALT  8   /  AlkPhos  59  01-13        Weight 80.3kg     Recommendations:   - while patient is admitted, suggest LT4 90mcg IV daily   - on discharge, recommend increasing to weight based LT4 125mcg daily, every morning on an empty stomach at least 30mins prior to food/other meds, 4 hours apart from Ca/Fe/MVI   - repeat free T4 in 1 week if still admitted at that time (1/20/23) to ensure FT4 levels are trending up, otherwise repeat TFTs in 4-6 weeks as outpatient   - From review of the last surg onc note in [employer], she has an endo appt in March (not within [facility])? If not, can follow up with endocrine at [address] [place] [address], [address], [place], [phone]   - will defer formal endocrine consult at this time     recs discussed with NATIVIDAD Gallardo, DO   Endocrine Fellow  For follow up questions, discharge recommendations, or new consults please call answering service at [phone] (weekdays), [phone] (nights/weekends). For nonurgent matters, please email [email or web address] or [email or web address]

## 2023-01-13 NOTE — PROGRESS NOTE ADULT - PROBLEM SELECTOR PLAN 1
ENT, dental and OMFS consulted  -s/p I&D of abscess and tooth extraction 1/12  -penrose drain in place  - change to regular and allow Pt discretion  -previous abscess culture grew strep anginosus and Prevotella denticola  -c/w Unasyn empirically f/u cultures  -f/u blood cultures  -c/w Peridex swish and spit BID  -pain control with morphine PRN, Toradol 15mg IV q8h x3 days  -start oxy as well prn

## 2023-01-13 NOTE — PROGRESS NOTE ADULT - SUBJECTIVE AND OBJECTIVE BOX
Aniya Metzger MD   [facility] Medicine  Teams preferred  Cell: [phone]    Patient is a 47y old  Female who presents with a chief complaint of abscess (12 Jan 2023 15:41)      INTERVAL HPI/OVERNIGHT EVENTS: no events yesterday. tolerated extraction well but still in significant amount of pain. requiring morphine frequently. finds diet unappetizing. afebrile    MEDICATIONS  (STANDING):  ampicillin/sulbactam  IVPB      ampicillin/sulbactam  IVPB 3 Gram(s) IV Intermittent every 6 hours  chlorhexidine 0.12% Liquid 15 milliLiter(s) Swish and Spit two times a day  famotidine    Tablet 20 milliGRAM(s) Oral daily  gabapentin 200 milliGRAM(s) Oral every 8 hours  influenza   Vaccine 0.5 milliLiter(s) IntraMuscular once  ketorolac   Injectable 15 milliGRAM(s) IV Push every 8 hours  potassium chloride   Powder 40 milliEquivalent(s) Oral every 6 hours  QUEtiapine 50 milliGRAM(s) Oral daily  sertraline 50 milliGRAM(s) Oral daily    MEDICATIONS  (PRN):  morphine  - Injectable 4 milliGRAM(s) IV Push every 4 hours PRN Severe Pain (7 - 10)  morphine  - Injectable 2 milliGRAM(s) IV Push every 4 hours PRN Moderate Pain (4 - 6)  oxyCODONE    IR 10 milliGRAM(s) Oral every 6 hours PRN Mild Pain (1 - 3)      Allergies    No Known Allergies    Intolerances        REVIEW OF SYSTEMS:  Please see interval HPI:    Vital Signs Last 24 Hrs  T(C): 36.6 (13 Jan 2023 09:22), Max: 37.1 (12 Jan 2023 17:37)  T(F): 97.9 (13 Jan 2023 09:22), Max: 98.7 (12 Jan 2023 17:37)  HR: 70 (13 Jan 2023 09:22) (66 - 82)  BP: 118/64 (13 Jan 2023 09:22) (100/63 - 130/87)  BP(mean): --  RR: 18 (13 Jan 2023 09:22) (18 - 18)  SpO2: 100% (13 Jan 2023 09:22) (94% - 100%)    Parameters below as of 13 Jan 2023 09:22  Patient On (Oxygen Delivery Method): room air      I&O's Detail        PHYSICAL EXAM:  Vital Signs Last 24 Hrs  T(C): 36.6 (13 Jan 2023 09:22), Max: 37.1 (12 Jan 2023 17:37)  T(F): 97.9 (13 Jan 2023 09:22), Max: 98.7 (12 Jan 2023 17:37)  HR: 70 (13 Jan 2023 09:22) (66 - 82)  BP: 118/64 (13 Jan 2023 09:22) (100/63 - 130/87)  BP(mean): --  RR: 18 (13 Jan 2023 09:22) (18 - 18)  SpO2: 100% (13 Jan 2023 09:22) (94% - 100%)    Parameters below as of 13 Jan 2023 09:22  Patient On (Oxygen Delivery Method): room air      CONSTITUTIONAL: NAD,   ENMT: left facial swelling, indurated, oral pharynx: left drain noted, left upper teeth extracted with clot, right upper teeth extracted with clot, 1 lower tooth extracted with clot  RESPIRATORY: Normal respiratory effort; lungs are clear to auscultation bilaterally  CARDIOVASCULAR: Regular rate and rhythm, normal S1 and S2, no murmur/rub/gallop; No lower extremity edema;  ABDOMEN: Nontender to palpation, normoactive bowel sounds, no rebound/guarding; No hepatosplenomegaly  EXT: no edema b/l  NEUROLOGY: following commands  SKIN: No rashes; no palpable lesions      LABS:                        9.2    7.53  )-----------( 235      ( 13 Jan 2023 06:18 )             27.9     13 Jan 2023 06:18    138    |  105    |  10     ----------------------------<  102    3.3     |  26     |  0.95     Ca    7.9        13 Jan 2023 06:18    TPro  5.5    /  Alb  2.9    /  TBili  <0.2   /  DBili  x      /  AST  14     /  ALT  8      /  AlkPhos  59     13 Jan 2023 06:18    PT/INR - ( 13 Jan 2023 06:18 )   PT: 11.0 sec;   INR: 0.95 ratio         PTT - ( 12 Jan 2023 06:15 )  PTT:33.0 sec  CAPILLARY BLOOD GLUCOSE        BLOOD CULTURE  01-12 @ 10:00   Few Streptococcus mitis/oralis group "Susceptibilities not performed"  Few Candida tropicalis "Susceptibilities not performed"  --  --  01-10 @ 19:55   No growth to date.  --  --  01-10 @ 19:30   No growth to date.  --  --    RADIOLOGY & ADDITIONAL TESTS:    Imaging Personally Reviewed:  [ ] YES     Consultant(s) Notes Reviewed:      Care Discussed with Consultants/Other Providers:

## 2023-01-14 LAB
ANION GAP SERPL CALC-SCNC: 7 MMOL/L — SIGNIFICANT CHANGE UP (ref 7–14)
BUN SERPL-MCNC: 12 MG/DL — SIGNIFICANT CHANGE UP (ref 7–23)
CALCIUM SERPL-MCNC: 8.2 MG/DL — LOW (ref 8.4–10.5)
CHLORIDE SERPL-SCNC: 105 MMOL/L — SIGNIFICANT CHANGE UP (ref 98–107)
CO2 SERPL-SCNC: 28 MMOL/L — SIGNIFICANT CHANGE UP (ref 22–31)
CREAT SERPL-MCNC: 0.98 MG/DL — SIGNIFICANT CHANGE UP (ref 0.5–1.3)
EGFR: 71 ML/MIN/1.73M2 — SIGNIFICANT CHANGE UP
GLUCOSE SERPL-MCNC: 80 MG/DL — SIGNIFICANT CHANGE UP (ref 70–99)
HCT VFR BLD CALC: 28.8 % — LOW (ref 34.5–45)
HGB BLD-MCNC: 9.1 G/DL — LOW (ref 11.5–15.5)
MAGNESIUM SERPL-MCNC: 2.2 MG/DL — SIGNIFICANT CHANGE UP (ref 1.6–2.6)
MCHC RBC-ENTMCNC: 31.6 GM/DL — LOW (ref 32–36)
MCHC RBC-ENTMCNC: 35.8 PG — HIGH (ref 27–34)
MCV RBC AUTO: 113.4 FL — HIGH (ref 80–100)
NRBC # BLD: 0 /100 WBCS — SIGNIFICANT CHANGE UP (ref 0–0)
NRBC # FLD: 0 K/UL — SIGNIFICANT CHANGE UP (ref 0–0)
PHOSPHATE SERPL-MCNC: 3.8 MG/DL — SIGNIFICANT CHANGE UP (ref 2.5–4.5)
PLATELET # BLD AUTO: 207 K/UL — SIGNIFICANT CHANGE UP (ref 150–400)
POTASSIUM SERPL-MCNC: 4.2 MMOL/L — SIGNIFICANT CHANGE UP (ref 3.5–5.3)
POTASSIUM SERPL-SCNC: 4.2 MMOL/L — SIGNIFICANT CHANGE UP (ref 3.5–5.3)
RBC # BLD: 2.54 M/UL — LOW (ref 3.8–5.2)
RBC # FLD: 12.8 % — SIGNIFICANT CHANGE UP (ref 10.3–14.5)
SODIUM SERPL-SCNC: 140 MMOL/L — SIGNIFICANT CHANGE UP (ref 135–145)
WBC # BLD: 5.74 K/UL — SIGNIFICANT CHANGE UP (ref 3.8–10.5)
WBC # FLD AUTO: 5.74 K/UL — SIGNIFICANT CHANGE UP (ref 3.8–10.5)

## 2023-01-14 PROCEDURE — 99232 SBSQ HOSP IP/OBS MODERATE 35: CPT

## 2023-01-14 RX ORDER — CALCIUM GLUCONATE 100 MG/ML
1 VIAL (ML) INTRAVENOUS ONCE
Refills: 0 | Status: COMPLETED | OUTPATIENT
Start: 2023-01-14 | End: 2023-01-14

## 2023-01-14 RX ORDER — OXYCODONE HYDROCHLORIDE 5 MG/1
10 TABLET ORAL EVERY 4 HOURS
Refills: 0 | Status: DISCONTINUED | OUTPATIENT
Start: 2023-01-14 | End: 2023-01-19

## 2023-01-14 RX ORDER — KETOROLAC TROMETHAMINE 30 MG/ML
15 SYRINGE (ML) INJECTION EVERY 8 HOURS
Refills: 0 | Status: DISCONTINUED | OUTPATIENT
Start: 2023-01-14 | End: 2023-01-16

## 2023-01-14 RX ORDER — KETOROLAC TROMETHAMINE 30 MG/ML
15 SYRINGE (ML) INJECTION EVERY 8 HOURS
Refills: 0 | Status: DISCONTINUED | OUTPATIENT
Start: 2023-01-14 | End: 2023-01-14

## 2023-01-14 RX ORDER — MORPHINE SULFATE 50 MG/1
2 CAPSULE, EXTENDED RELEASE ORAL EVERY 6 HOURS
Refills: 0 | Status: DISCONTINUED | OUTPATIENT
Start: 2023-01-14 | End: 2023-01-15

## 2023-01-14 RX ADMIN — Medication 15 MILLIGRAM(S): at 13:12

## 2023-01-14 RX ADMIN — Medication 100 GRAM(S): at 21:09

## 2023-01-14 RX ADMIN — AMPICILLIN SODIUM AND SULBACTAM SODIUM 200 GRAM(S): 250; 125 INJECTION, POWDER, FOR SUSPENSION INTRAMUSCULAR; INTRAVENOUS at 21:50

## 2023-01-14 RX ADMIN — CHLORHEXIDINE GLUCONATE 15 MILLILITER(S): 213 SOLUTION TOPICAL at 05:10

## 2023-01-14 RX ADMIN — SERTRALINE 50 MILLIGRAM(S): 25 TABLET, FILM COATED ORAL at 21:10

## 2023-01-14 RX ADMIN — Medication 15 MILLIGRAM(S): at 10:01

## 2023-01-14 RX ADMIN — CHLORHEXIDINE GLUCONATE 15 MILLILITER(S): 213 SOLUTION TOPICAL at 17:55

## 2023-01-14 RX ADMIN — OXYCODONE HYDROCHLORIDE 10 MILLIGRAM(S): 5 TABLET ORAL at 19:57

## 2023-01-14 RX ADMIN — MORPHINE SULFATE 2 MILLIGRAM(S): 50 CAPSULE, EXTENDED RELEASE ORAL at 12:02

## 2023-01-14 RX ADMIN — Medication 90 MICROGRAM(S): at 05:35

## 2023-01-14 RX ADMIN — GABAPENTIN 200 MILLIGRAM(S): 400 CAPSULE ORAL at 21:09

## 2023-01-14 RX ADMIN — OXYCODONE HYDROCHLORIDE 10 MILLIGRAM(S): 5 TABLET ORAL at 16:01

## 2023-01-14 RX ADMIN — AMPICILLIN SODIUM AND SULBACTAM SODIUM 200 GRAM(S): 250; 125 INJECTION, POWDER, FOR SUSPENSION INTRAMUSCULAR; INTRAVENOUS at 03:15

## 2023-01-14 RX ADMIN — AMPICILLIN SODIUM AND SULBACTAM SODIUM 200 GRAM(S): 250; 125 INJECTION, POWDER, FOR SUSPENSION INTRAMUSCULAR; INTRAVENOUS at 09:31

## 2023-01-14 RX ADMIN — MORPHINE SULFATE 2 MILLIGRAM(S): 50 CAPSULE, EXTENDED RELEASE ORAL at 11:32

## 2023-01-14 RX ADMIN — GABAPENTIN 200 MILLIGRAM(S): 400 CAPSULE ORAL at 13:12

## 2023-01-14 RX ADMIN — MORPHINE SULFATE 4 MILLIGRAM(S): 50 CAPSULE, EXTENDED RELEASE ORAL at 07:27

## 2023-01-14 RX ADMIN — QUETIAPINE FUMARATE 50 MILLIGRAM(S): 200 TABLET, FILM COATED ORAL at 21:10

## 2023-01-14 RX ADMIN — AMPICILLIN SODIUM AND SULBACTAM SODIUM 200 GRAM(S): 250; 125 INJECTION, POWDER, FOR SUSPENSION INTRAMUSCULAR; INTRAVENOUS at 15:31

## 2023-01-14 RX ADMIN — MORPHINE SULFATE 2 MILLIGRAM(S): 50 CAPSULE, EXTENDED RELEASE ORAL at 18:36

## 2023-01-14 RX ADMIN — Medication 15 MILLIGRAM(S): at 02:23

## 2023-01-14 RX ADMIN — MORPHINE SULFATE 4 MILLIGRAM(S): 50 CAPSULE, EXTENDED RELEASE ORAL at 08:20

## 2023-01-14 RX ADMIN — MORPHINE SULFATE 4 MILLIGRAM(S): 50 CAPSULE, EXTENDED RELEASE ORAL at 00:00

## 2023-01-14 RX ADMIN — Medication 15 MILLIGRAM(S): at 09:31

## 2023-01-14 RX ADMIN — Medication 15 MILLIGRAM(S): at 13:42

## 2023-01-14 RX ADMIN — Medication 15 MILLIGRAM(S): at 21:10

## 2023-01-14 RX ADMIN — OXYCODONE HYDROCHLORIDE 10 MILLIGRAM(S): 5 TABLET ORAL at 15:31

## 2023-01-14 RX ADMIN — GABAPENTIN 200 MILLIGRAM(S): 400 CAPSULE ORAL at 05:09

## 2023-01-14 RX ADMIN — FAMOTIDINE 20 MILLIGRAM(S): 10 INJECTION INTRAVENOUS at 11:32

## 2023-01-14 RX ADMIN — MORPHINE SULFATE 2 MILLIGRAM(S): 50 CAPSULE, EXTENDED RELEASE ORAL at 18:09

## 2023-01-14 NOTE — PROGRESS NOTE ADULT - PROBLEM SELECTOR PLAN 1
ENT, dental and OMFS consulted  -s/p I&D of abscess and tooth extraction 1/12  -penrose drain in place  - change to regular and allow Pt discretion  -previous abscess culture grew strep anginosus and Prevotella denticola  -c/w Unasyn empirically f/u cultures, seems like strep  -f/u blood cultures  -c/w Peridex swish and spit BID  -pain control with morphine PRN, Toradol 15mg IV q8h x 2 more days  -start oxy as well prn  -decrease IV morphine dose

## 2023-01-14 NOTE — PROGRESS NOTE ADULT - SUBJECTIVE AND OBJECTIVE BOX
SUBJECTIVE:  Pt seen & examined at bedside. No acute overnight events. Patient reports stable symptoms from yesterday. Still with tight swelling of cheek tracking superiorly to temporal region. Area is still very tender. Also still has trismus  Vital Signs Last 24 Hrs  T(C): 36.6 (14 Jan 2023 09:52), Max: 36.7 (13 Jan 2023 18:00)  T(F): 97.9 (14 Jan 2023 09:52), Max: 98.1 (13 Jan 2023 18:00)  HR: 69 (14 Jan 2023 09:52) (60 - 71)  BP: 105/61 (14 Jan 2023 09:52) (99/61 - 120/69)  BP(mean): --  RR: 18 (14 Jan 2023 09:52) (16 - 18)  SpO2: 99% (14 Jan 2023 09:52) (95% - 99%)    Parameters below as of 14 Jan 2023 09:52  Patient On (Oxygen Delivery Method): room air    PE:  + bulky swelling of left cheek tracking superiorly to temporal region. Tender to palpation.  + intraoral penrose draining massicator space sutured in, no purulence/drainage noted from drain.     A/P: 47F with left facial abscess s/p I&D 1/2 p/w recurrenct L absces 1/10 s/p I&D and dentral extraction 1/12. Swelling/pain stable. Not worse but doesn't appear to be improving. Abscess culture growing strep anginosa and prevotella dent. Blood cultures ngtd.  - recommend repeat ct max/face with contrast  - intraoral penrose drain to stay for now  - continue IV per medicine service, currently on unasyn   - ENT will follow, page with questions

## 2023-01-14 NOTE — PROGRESS NOTE ADULT - SUBJECTIVE AND OBJECTIVE BOX
Aniya Metzger MD   [facility] Medicine  Teams preferred  Cell: [phone]    Patient is a 47y old  Female who presents with a chief complaint of abscess (13 Jan 2023 13:59)      INTERVAL HPI/OVERNIGHT EVENTS: no events overnight. this AM, Pt reports having left sided headache and face feels tighter. does not seem to be draining from drain. afebrile    MEDICATIONS  (STANDING):  ampicillin/sulbactam  IVPB      ampicillin/sulbactam  IVPB 3 Gram(s) IV Intermittent every 6 hours  chlorhexidine 0.12% Liquid 15 milliLiter(s) Swish and Spit two times a day  chlorhexidine 0.12% Liquid 15 milliLiter(s) Swish and Spit two times a day  famotidine    Tablet 20 milliGRAM(s) Oral daily  gabapentin 200 milliGRAM(s) Oral every 8 hours  influenza   Vaccine 0.5 milliLiter(s) IntraMuscular once  ketorolac   Injectable 15 milliGRAM(s) IV Push every 8 hours  levothyroxine Injectable 90 MICROGram(s) IV Push <User Schedule>  QUEtiapine 50 milliGRAM(s) Oral daily  sertraline 50 milliGRAM(s) Oral daily    MEDICATIONS  (PRN):  morphine  - Injectable 2 milliGRAM(s) IV Push every 6 hours PRN Moderate Pain (4 - 6)  oxyCODONE    IR 10 milliGRAM(s) Oral every 4 hours PRN Mild Pain (1 - 3)      Allergies    No Known Allergies    Intolerances        REVIEW OF SYSTEMS:  Please see interval HPI:    Vital Signs Last 24 Hrs  T(C): 36.6 (14 Jan 2023 09:52), Max: 36.7 (13 Jan 2023 18:00)  T(F): 97.9 (14 Jan 2023 09:52), Max: 98.1 (13 Jan 2023 18:00)  HR: 69 (14 Jan 2023 09:52) (60 - 71)  BP: 105/61 (14 Jan 2023 09:52) (99/61 - 120/69)  BP(mean): --  RR: 18 (14 Jan 2023 09:52) (16 - 18)  SpO2: 99% (14 Jan 2023 09:52) (95% - 99%)    Parameters below as of 14 Jan 2023 09:52  Patient On (Oxygen Delivery Method): room air      I&O's Detail        PHYSICAL EXAM:  Vital Signs Last 24 Hrs  T(C): 36.6 (14 Jan 2023 09:52), Max: 36.7 (13 Jan 2023 18:00)  T(F): 97.9 (14 Jan 2023 09:52), Max: 98.1 (13 Jan 2023 18:00)  HR: 69 (14 Jan 2023 09:52) (60 - 71)  BP: 105/61 (14 Jan 2023 09:52) (99/61 - 120/69)  BP(mean): --  RR: 18 (14 Jan 2023 09:52) (16 - 18)  SpO2: 99% (14 Jan 2023 09:52) (95% - 99%)    Parameters below as of 14 Jan 2023 09:52  Patient On (Oxygen Delivery Method): room air      CONSTITUTIONAL: NAD, well-developed, well-groomed  ENMT: Moist oral mucosa, no active bleeding noted. 2 teeth extracted on left upper set, drain in place, 1 lower tooth extracted, right side also tooth extracted, significant left facial swelling noted, by the angle of the mandible no significant change from yesterday  RESPIRATORY: Normal respiratory effort; lungs are clear to auscultation bilaterally  CARDIOVASCULAR: Regular rate and rhythm, normal S1 and S2, no murmur/rub/gallop;   ABDOMEN: Nontender to palpation, normoactive bowel sounds, no rebound/guarding; No hepatosplenomegaly  EXT: no edema b/l  NEUROLOGY: alert, following commands  SKIN: No rashes; no palpable lesions      LABS:                        9.1    5.74  )-----------( 207      ( 14 Jan 2023 05:50 )             28.8     14 Jan 2023 05:50    140    |  105    |  12     ----------------------------<  80     4.2     |  28     |  0.98     Ca    8.2        14 Jan 2023 05:50  Phos  3.8       14 Jan 2023 05:50  Mg     2.20      14 Jan 2023 05:50      PT/INR - ( 13 Jan 2023 06:18 )   PT: 11.0 sec;   INR: 0.95 ratio           CAPILLARY BLOOD GLUCOSE        BLOOD CULTURE  01-12 @ 10:00   Few Streptococcus mitis/oralis group "Susceptibilities not performed"  Few Candida tropicalis "Susceptibilities not performed"  Rare Moni dubliniensis "Susceptibilities not performed"  --  --  01-10 @ 19:55   No growth to date.  --  --  01-10 @ 19:30   No growth to date.  --  --    RADIOLOGY & ADDITIONAL TESTS:    Imaging Personally Reviewed:  [ ] YES     Consultant(s) Notes Reviewed:      Care Discussed with Consultants/Other Providers:

## 2023-01-14 NOTE — PROGRESS NOTE ADULT - ASSESSMENT
47y Female with PMH of depression, anxiety, and thyroid cancer s/p left partial thyroidectomy (1996) and central neck dissection (5/22) with recent admission at [facility] for left facial abscess p/w recurrent abscess, failed output PO abx.

## 2023-01-15 LAB
ANION GAP SERPL CALC-SCNC: 8 MMOL/L — SIGNIFICANT CHANGE UP (ref 7–14)
BUN SERPL-MCNC: 16 MG/DL — SIGNIFICANT CHANGE UP (ref 7–23)
CALCIUM SERPL-MCNC: 8.2 MG/DL — LOW (ref 8.4–10.5)
CHLORIDE SERPL-SCNC: 103 MMOL/L — SIGNIFICANT CHANGE UP (ref 98–107)
CO2 SERPL-SCNC: 29 MMOL/L — SIGNIFICANT CHANGE UP (ref 22–31)
CREAT SERPL-MCNC: 1.03 MG/DL — SIGNIFICANT CHANGE UP (ref 0.5–1.3)
EGFR: 67 ML/MIN/1.73M2 — SIGNIFICANT CHANGE UP
GLUCOSE SERPL-MCNC: 81 MG/DL — SIGNIFICANT CHANGE UP (ref 70–99)
HCT VFR BLD CALC: 26.4 % — LOW (ref 34.5–45)
HGB BLD-MCNC: 8.4 G/DL — LOW (ref 11.5–15.5)
MAGNESIUM SERPL-MCNC: 2 MG/DL — SIGNIFICANT CHANGE UP (ref 1.6–2.6)
MCHC RBC-ENTMCNC: 31.8 GM/DL — LOW (ref 32–36)
MCHC RBC-ENTMCNC: 35.4 PG — HIGH (ref 27–34)
MCV RBC AUTO: 111.4 FL — HIGH (ref 80–100)
NRBC # BLD: 0 /100 WBCS — SIGNIFICANT CHANGE UP (ref 0–0)
NRBC # FLD: 0 K/UL — SIGNIFICANT CHANGE UP (ref 0–0)
PHOSPHATE SERPL-MCNC: 4.9 MG/DL — HIGH (ref 2.5–4.5)
PLATELET # BLD AUTO: 201 K/UL — SIGNIFICANT CHANGE UP (ref 150–400)
POTASSIUM SERPL-MCNC: 4 MMOL/L — SIGNIFICANT CHANGE UP (ref 3.5–5.3)
POTASSIUM SERPL-SCNC: 4 MMOL/L — SIGNIFICANT CHANGE UP (ref 3.5–5.3)
RBC # BLD: 2.37 M/UL — LOW (ref 3.8–5.2)
RBC # FLD: 12.6 % — SIGNIFICANT CHANGE UP (ref 10.3–14.5)
SODIUM SERPL-SCNC: 140 MMOL/L — SIGNIFICANT CHANGE UP (ref 135–145)
WBC # BLD: 6.55 K/UL — SIGNIFICANT CHANGE UP (ref 3.8–10.5)
WBC # FLD AUTO: 6.55 K/UL — SIGNIFICANT CHANGE UP (ref 3.8–10.5)

## 2023-01-15 PROCEDURE — 70487 CT MAXILLOFACIAL W/DYE: CPT | Mod: 26

## 2023-01-15 PROCEDURE — 99233 SBSQ HOSP IP/OBS HIGH 50: CPT

## 2023-01-15 RX ORDER — MORPHINE SULFATE 50 MG/1
4 CAPSULE, EXTENDED RELEASE ORAL EVERY 6 HOURS
Refills: 0 | Status: DISCONTINUED | OUTPATIENT
Start: 2023-01-15 | End: 2023-01-17

## 2023-01-15 RX ORDER — SENNA PLUS 8.6 MG/1
2 TABLET ORAL AT BEDTIME
Refills: 0 | Status: DISCONTINUED | OUTPATIENT
Start: 2023-01-15 | End: 2023-02-01

## 2023-01-15 RX ORDER — POLYETHYLENE GLYCOL 3350 17 G/17G
17 POWDER, FOR SOLUTION ORAL DAILY
Refills: 0 | Status: DISCONTINUED | OUTPATIENT
Start: 2023-01-15 | End: 2023-02-01

## 2023-01-15 RX ADMIN — MORPHINE SULFATE 4 MILLIGRAM(S): 50 CAPSULE, EXTENDED RELEASE ORAL at 20:23

## 2023-01-15 RX ADMIN — OXYCODONE HYDROCHLORIDE 10 MILLIGRAM(S): 5 TABLET ORAL at 21:59

## 2023-01-15 RX ADMIN — Medication 15 MILLIGRAM(S): at 06:31

## 2023-01-15 RX ADMIN — MORPHINE SULFATE 2 MILLIGRAM(S): 50 CAPSULE, EXTENDED RELEASE ORAL at 07:40

## 2023-01-15 RX ADMIN — Medication 15 MILLIGRAM(S): at 15:42

## 2023-01-15 RX ADMIN — Medication 90 MICROGRAM(S): at 06:31

## 2023-01-15 RX ADMIN — OXYCODONE HYDROCHLORIDE 10 MILLIGRAM(S): 5 TABLET ORAL at 22:29

## 2023-01-15 RX ADMIN — POLYETHYLENE GLYCOL 3350 17 GRAM(S): 17 POWDER, FOR SOLUTION ORAL at 15:43

## 2023-01-15 RX ADMIN — SERTRALINE 50 MILLIGRAM(S): 25 TABLET, FILM COATED ORAL at 21:46

## 2023-01-15 RX ADMIN — GABAPENTIN 200 MILLIGRAM(S): 400 CAPSULE ORAL at 06:32

## 2023-01-15 RX ADMIN — AMPICILLIN SODIUM AND SULBACTAM SODIUM 200 GRAM(S): 250; 125 INJECTION, POWDER, FOR SUSPENSION INTRAMUSCULAR; INTRAVENOUS at 17:37

## 2023-01-15 RX ADMIN — GABAPENTIN 200 MILLIGRAM(S): 400 CAPSULE ORAL at 21:46

## 2023-01-15 RX ADMIN — OXYCODONE HYDROCHLORIDE 10 MILLIGRAM(S): 5 TABLET ORAL at 18:50

## 2023-01-15 RX ADMIN — OXYCODONE HYDROCHLORIDE 10 MILLIGRAM(S): 5 TABLET ORAL at 17:59

## 2023-01-15 RX ADMIN — AMPICILLIN SODIUM AND SULBACTAM SODIUM 200 GRAM(S): 250; 125 INJECTION, POWDER, FOR SUSPENSION INTRAMUSCULAR; INTRAVENOUS at 22:56

## 2023-01-15 RX ADMIN — QUETIAPINE FUMARATE 50 MILLIGRAM(S): 200 TABLET, FILM COATED ORAL at 21:46

## 2023-01-15 RX ADMIN — AMPICILLIN SODIUM AND SULBACTAM SODIUM 200 GRAM(S): 250; 125 INJECTION, POWDER, FOR SUSPENSION INTRAMUSCULAR; INTRAVENOUS at 11:04

## 2023-01-15 RX ADMIN — MORPHINE SULFATE 2 MILLIGRAM(S): 50 CAPSULE, EXTENDED RELEASE ORAL at 14:20

## 2023-01-15 RX ADMIN — MORPHINE SULFATE 4 MILLIGRAM(S): 50 CAPSULE, EXTENDED RELEASE ORAL at 19:53

## 2023-01-15 RX ADMIN — MORPHINE SULFATE 2 MILLIGRAM(S): 50 CAPSULE, EXTENDED RELEASE ORAL at 06:48

## 2023-01-15 RX ADMIN — Medication 15 MILLIGRAM(S): at 22:56

## 2023-01-15 RX ADMIN — MORPHINE SULFATE 2 MILLIGRAM(S): 50 CAPSULE, EXTENDED RELEASE ORAL at 13:51

## 2023-01-15 RX ADMIN — OXYCODONE HYDROCHLORIDE 10 MILLIGRAM(S): 5 TABLET ORAL at 11:04

## 2023-01-15 RX ADMIN — AMPICILLIN SODIUM AND SULBACTAM SODIUM 200 GRAM(S): 250; 125 INJECTION, POWDER, FOR SUSPENSION INTRAMUSCULAR; INTRAVENOUS at 03:55

## 2023-01-15 RX ADMIN — CHLORHEXIDINE GLUCONATE 15 MILLILITER(S): 213 SOLUTION TOPICAL at 17:37

## 2023-01-15 RX ADMIN — CHLORHEXIDINE GLUCONATE 15 MILLILITER(S): 213 SOLUTION TOPICAL at 06:31

## 2023-01-15 RX ADMIN — OXYCODONE HYDROCHLORIDE 10 MILLIGRAM(S): 5 TABLET ORAL at 12:04

## 2023-01-15 RX ADMIN — Medication 15 MILLIGRAM(S): at 23:11

## 2023-01-15 RX ADMIN — FAMOTIDINE 20 MILLIGRAM(S): 10 INJECTION INTRAVENOUS at 12:17

## 2023-01-15 RX ADMIN — GABAPENTIN 200 MILLIGRAM(S): 400 CAPSULE ORAL at 14:59

## 2023-01-15 NOTE — PROGRESS NOTE ADULT - PROBLEM SELECTOR PLAN 1
ENT, dental and OMFS consulted  -s/p I&D of abscess and tooth extraction 1/12  -CT max facial ordered, will f/u  -penrose drain in place  - change to regular and allow Pt discretion  -previous abscess culture grew strep anginosus and Prevotella denticola  -c/w Unasyn empirically f/u cultures, seems like strep  -f/u blood cultures  -c/w Peridex swish and spit BID  -pain control with morphine PRN, Toradol 15mg IV q8h x 2 more days  -start oxy as well prn  -increase IV morphine again

## 2023-01-15 NOTE — PROGRESS NOTE ADULT - SUBJECTIVE AND OBJECTIVE BOX
Aniya Metzger MD   [facility] Medicine  Teams preferred  Cell: [phone]    Patient is a 47y old  Female who presents with a chief complaint of abscess (14 Jan 2023 15:29)      INTERVAL HPI/OVERNIGHT EVENTS: pt reports rough night. pain uncontrolled. morphine gives relief but lower dose was suboptimal    MEDICATIONS  (STANDING):  ampicillin/sulbactam  IVPB      ampicillin/sulbactam  IVPB 3 Gram(s) IV Intermittent every 6 hours  chlorhexidine 0.12% Liquid 15 milliLiter(s) Swish and Spit two times a day  chlorhexidine 0.12% Liquid 15 milliLiter(s) Swish and Spit two times a day  famotidine    Tablet 20 milliGRAM(s) Oral daily  gabapentin 200 milliGRAM(s) Oral every 8 hours  influenza   Vaccine 0.5 milliLiter(s) IntraMuscular once  ketorolac   Injectable 15 milliGRAM(s) IV Push every 8 hours  levothyroxine Injectable 90 MICROGram(s) IV Push <User Schedule>  polyethylene glycol 3350 17 Gram(s) Oral daily  QUEtiapine 50 milliGRAM(s) Oral daily  senna 2 Tablet(s) Oral at bedtime  sertraline 50 milliGRAM(s) Oral daily    MEDICATIONS  (PRN):  morphine  - Injectable 4 milliGRAM(s) IV Push every 6 hours PRN Severe Pain (7 - 10)  oxyCODONE    IR 10 milliGRAM(s) Oral every 4 hours PRN Mild Pain (1 - 3)      Allergies    No Known Allergies    Intolerances        REVIEW OF SYSTEMS:  Please see interval HPI:    Vital Signs Last 24 Hrs  T(C): 36.8 (15 Chaz 2023 09:38), Max: 36.8 (15 Chaz 2023 09:38)  T(F): 98.2 (15 Chaz 2023 09:38), Max: 98.2 (15 Chaz 2023 09:38)  HR: 70 (15 Chaz 2023 13:46) (61 - 77)  BP: 114/79 (15 Chaz 2023 13:46) (105/71 - 136/70)  BP(mean): --  RR: 17 (15 Chaz 2023 13:46) (17 - 18)  SpO2: 100% (15 Chaz 2023 13:46) (94% - 100%)    Parameters below as of 15 Chaz 2023 13:46  Patient On (Oxygen Delivery Method): room air      I&O's Detail        PHYSICAL EXAM:  Vital Signs Last 24 Hrs  T(C): 36.8 (15 Chaz 2023 09:38), Max: 36.8 (15 Chaz 2023 09:38)  T(F): 98.2 (15 Chaz 2023 09:38), Max: 98.2 (15 Chaz 2023 09:38)  HR: 70 (15 Chaz 2023 13:46) (61 - 77)  BP: 114/79 (15 Chaz 2023 13:46) (105/71 - 136/70)  BP(mean): --  RR: 17 (15 Chaz 2023 13:46) (17 - 18)  SpO2: 100% (15 Chaz 2023 13:46) (94% - 100%)    Parameters below as of 15 Chaz 2023 13:46  Patient On (Oxygen Delivery Method): room air        CONSTITUTIONAL: NAD, well-developed, well-groomed  ENMT: Moist oral mucosa, no active bleeding noted. 2 teeth extracted on left upper set, drain in place, 1 lower tooth extracted, right side also tooth extracted, significant left facial swelling noted, by the angle of the mandible tracking upwards now with bruising  RESPIRATORY: Normal respiratory effort; lungs are clear to auscultation bilaterally  CARDIOVASCULAR: Regular rate and rhythm, normal S1 and S2, no murmur/rub/gallop;   ABDOMEN: Nontender to palpation, normoactive bowel sounds, no rebound/guarding; No hepatosplenomegaly  EXT: no edema b/l  NEUROLOGY: alert, following commands  SKIN: No rashes; no palpable lesions      LABS:                        8.4    6.55  )-----------( 201      ( 15 Chaz 2023 05:25 )             26.4     15 Chaz 2023 05:25    140    |  103    |  16     ----------------------------<  81     4.0     |  29     |  1.03     Ca    8.2        15 Chaz 2023 05:25  Phos  4.9       15 Chaz 2023 05:25  Mg     2.00      15 Chaz 2023 05:25        CAPILLARY BLOOD GLUCOSE        BLOOD CULTURE  01-12 @ 10:00   Few Streptococcus mitis/oralis group "Susceptibilities not performed"  Few Candida tropicalis "Susceptibilities not performed"  Rare Moni dubliniensis "Susceptibilities not performed"  --  --    RADIOLOGY & ADDITIONAL TESTS:    Imaging Personally Reviewed:  [ ] YES     Consultant(s) Notes Reviewed:      Care Discussed with Consultants/Other Providers:

## 2023-01-15 NOTE — PROGRESS NOTE ADULT - SUBJECTIVE AND OBJECTIVE BOX
SUBJECTIVE:  Pt seen & examined at bedside. No acute overnight events. Patient reports some increased pain from yesterday. Still with tight swelling of cheek tracking superiorly to temporal region. Area is still very tender. Also still has trismus. CT completed but pending read. afebrile.     Vital Signs Last 24 Hrs  T(C): 36.7 (15 Chaz 2023 18:17), Max: 36.8 (15 Chaz 2023 09:38)  T(F): 98.1 (15 Chaz 2023 18:17), Max: 98.2 (15 Chaz 2023 09:38)  HR: 79 (15 Chaz 2023 19:40) (61 - 79)  BP: 119/75 (15 Chaz 2023 19:40) (105/71 - 136/70)  BP(mean): --  RR: 18 (15 Chaz 2023 19:40) (17 - 18)  SpO2: 100% (15 Chaz 2023 19:40) (94% - 100%)  PE:  + bulky swelling of left cheek tracking superiorly to temporal region. Tender to palpation.   + intraoral penrose draining massicator space sutured in, no purulence/drainage noted from drain. thick debris removed from penrose    A/P: 47F with left facial abscess s/p I&D 1/2 p/w recurrenct L absces 1/10 s/p I&D and dentral extraction 1/12. Swelling/pain stable. Not worse but doesn't appear to be improving. Abscess culture growing strep anginosa and prevotella dent. Blood cultures ngtd.  - f/u CT final read  - will discuss with attending possible RTOR  - intraoral penrose drain to stay for now  - continue IV per medicine service, currently on unasyn   - ENT will follow, page with questions

## 2023-01-15 NOTE — PROGRESS NOTE ADULT - ASSESSMENT
47y Female with PMH of depression, anxiety, and thyroid cancer s/p left partial thyroidectomy (1996) and central neck dissection (5/22) with recent admission at [facility] for left facial abscess p/w recurrent abscess, failed output PO abx.

## 2023-01-16 LAB
ANION GAP SERPL CALC-SCNC: 9 MMOL/L — SIGNIFICANT CHANGE UP (ref 7–14)
BUN SERPL-MCNC: 16 MG/DL — SIGNIFICANT CHANGE UP (ref 7–23)
CALCIUM SERPL-MCNC: 8.7 MG/DL — SIGNIFICANT CHANGE UP (ref 8.4–10.5)
CHLORIDE SERPL-SCNC: 99 MMOL/L — SIGNIFICANT CHANGE UP (ref 98–107)
CO2 SERPL-SCNC: 29 MMOL/L — SIGNIFICANT CHANGE UP (ref 22–31)
CREAT SERPL-MCNC: 0.96 MG/DL — SIGNIFICANT CHANGE UP (ref 0.5–1.3)
CULTURE RESULTS: SIGNIFICANT CHANGE UP
CULTURE RESULTS: SIGNIFICANT CHANGE UP
EGFR: 73 ML/MIN/1.73M2 — SIGNIFICANT CHANGE UP
GLUCOSE SERPL-MCNC: 85 MG/DL — SIGNIFICANT CHANGE UP (ref 70–99)
HCT VFR BLD CALC: 29.9 % — LOW (ref 34.5–45)
HGB BLD-MCNC: 9.5 G/DL — LOW (ref 11.5–15.5)
MAGNESIUM SERPL-MCNC: 2.3 MG/DL — SIGNIFICANT CHANGE UP (ref 1.6–2.6)
MCHC RBC-ENTMCNC: 31.8 GM/DL — LOW (ref 32–36)
MCHC RBC-ENTMCNC: 35.8 PG — HIGH (ref 27–34)
MCV RBC AUTO: 112.8 FL — HIGH (ref 80–100)
NRBC # BLD: 0 /100 WBCS — SIGNIFICANT CHANGE UP (ref 0–0)
NRBC # FLD: 0 K/UL — SIGNIFICANT CHANGE UP (ref 0–0)
PHOSPHATE SERPL-MCNC: 5.8 MG/DL — HIGH (ref 2.5–4.5)
PLATELET # BLD AUTO: 228 K/UL — SIGNIFICANT CHANGE UP (ref 150–400)
POTASSIUM SERPL-MCNC: 4.1 MMOL/L — SIGNIFICANT CHANGE UP (ref 3.5–5.3)
POTASSIUM SERPL-SCNC: 4.1 MMOL/L — SIGNIFICANT CHANGE UP (ref 3.5–5.3)
RBC # BLD: 2.65 M/UL — LOW (ref 3.8–5.2)
RBC # FLD: 12.7 % — SIGNIFICANT CHANGE UP (ref 10.3–14.5)
SODIUM SERPL-SCNC: 137 MMOL/L — SIGNIFICANT CHANGE UP (ref 135–145)
SPECIMEN SOURCE: SIGNIFICANT CHANGE UP
SPECIMEN SOURCE: SIGNIFICANT CHANGE UP
WBC # BLD: 7.25 K/UL — SIGNIFICANT CHANGE UP (ref 3.8–10.5)
WBC # FLD AUTO: 7.25 K/UL — SIGNIFICANT CHANGE UP (ref 3.8–10.5)

## 2023-01-16 PROCEDURE — 99233 SBSQ HOSP IP/OBS HIGH 50: CPT

## 2023-01-16 RX ORDER — MORPHINE SULFATE 50 MG/1
2 CAPSULE, EXTENDED RELEASE ORAL EVERY 4 HOURS
Refills: 0 | Status: DISCONTINUED | OUTPATIENT
Start: 2023-01-16 | End: 2023-01-20

## 2023-01-16 RX ORDER — MORPHINE SULFATE 50 MG/1
4 CAPSULE, EXTENDED RELEASE ORAL ONCE
Refills: 0 | Status: DISCONTINUED | OUTPATIENT
Start: 2023-01-16 | End: 2023-01-16

## 2023-01-16 RX ADMIN — CHLORHEXIDINE GLUCONATE 15 MILLILITER(S): 213 SOLUTION TOPICAL at 05:18

## 2023-01-16 RX ADMIN — AMPICILLIN SODIUM AND SULBACTAM SODIUM 200 GRAM(S): 250; 125 INJECTION, POWDER, FOR SUSPENSION INTRAMUSCULAR; INTRAVENOUS at 14:09

## 2023-01-16 RX ADMIN — AMPICILLIN SODIUM AND SULBACTAM SODIUM 200 GRAM(S): 250; 125 INJECTION, POWDER, FOR SUSPENSION INTRAMUSCULAR; INTRAVENOUS at 09:10

## 2023-01-16 RX ADMIN — OXYCODONE HYDROCHLORIDE 10 MILLIGRAM(S): 5 TABLET ORAL at 05:17

## 2023-01-16 RX ADMIN — MORPHINE SULFATE 4 MILLIGRAM(S): 50 CAPSULE, EXTENDED RELEASE ORAL at 01:04

## 2023-01-16 RX ADMIN — GABAPENTIN 200 MILLIGRAM(S): 400 CAPSULE ORAL at 14:08

## 2023-01-16 RX ADMIN — SERTRALINE 50 MILLIGRAM(S): 25 TABLET, FILM COATED ORAL at 21:31

## 2023-01-16 RX ADMIN — GABAPENTIN 200 MILLIGRAM(S): 400 CAPSULE ORAL at 21:31

## 2023-01-16 RX ADMIN — CHLORHEXIDINE GLUCONATE 15 MILLILITER(S): 213 SOLUTION TOPICAL at 17:09

## 2023-01-16 RX ADMIN — AMPICILLIN SODIUM AND SULBACTAM SODIUM 200 GRAM(S): 250; 125 INJECTION, POWDER, FOR SUSPENSION INTRAMUSCULAR; INTRAVENOUS at 21:30

## 2023-01-16 RX ADMIN — OXYCODONE HYDROCHLORIDE 10 MILLIGRAM(S): 5 TABLET ORAL at 13:17

## 2023-01-16 RX ADMIN — QUETIAPINE FUMARATE 50 MILLIGRAM(S): 200 TABLET, FILM COATED ORAL at 21:30

## 2023-01-16 RX ADMIN — POLYETHYLENE GLYCOL 3350 17 GRAM(S): 17 POWDER, FOR SOLUTION ORAL at 17:09

## 2023-01-16 RX ADMIN — OXYCODONE HYDROCHLORIDE 10 MILLIGRAM(S): 5 TABLET ORAL at 16:24

## 2023-01-16 RX ADMIN — MORPHINE SULFATE 4 MILLIGRAM(S): 50 CAPSULE, EXTENDED RELEASE ORAL at 01:19

## 2023-01-16 RX ADMIN — MORPHINE SULFATE 4 MILLIGRAM(S): 50 CAPSULE, EXTENDED RELEASE ORAL at 10:00

## 2023-01-16 RX ADMIN — SENNA PLUS 2 TABLET(S): 8.6 TABLET ORAL at 21:31

## 2023-01-16 RX ADMIN — FAMOTIDINE 20 MILLIGRAM(S): 10 INJECTION INTRAVENOUS at 14:09

## 2023-01-16 RX ADMIN — MORPHINE SULFATE 4 MILLIGRAM(S): 50 CAPSULE, EXTENDED RELEASE ORAL at 20:32

## 2023-01-16 RX ADMIN — OXYCODONE HYDROCHLORIDE 10 MILLIGRAM(S): 5 TABLET ORAL at 17:31

## 2023-01-16 RX ADMIN — Medication 15 MILLIGRAM(S): at 06:01

## 2023-01-16 RX ADMIN — MORPHINE SULFATE 4 MILLIGRAM(S): 50 CAPSULE, EXTENDED RELEASE ORAL at 20:12

## 2023-01-16 RX ADMIN — MORPHINE SULFATE 4 MILLIGRAM(S): 50 CAPSULE, EXTENDED RELEASE ORAL at 14:08

## 2023-01-16 RX ADMIN — Medication 90 MICROGRAM(S): at 05:17

## 2023-01-16 RX ADMIN — OXYCODONE HYDROCHLORIDE 10 MILLIGRAM(S): 5 TABLET ORAL at 05:47

## 2023-01-16 RX ADMIN — GABAPENTIN 200 MILLIGRAM(S): 400 CAPSULE ORAL at 05:18

## 2023-01-16 RX ADMIN — AMPICILLIN SODIUM AND SULBACTAM SODIUM 200 GRAM(S): 250; 125 INJECTION, POWDER, FOR SUSPENSION INTRAMUSCULAR; INTRAVENOUS at 05:17

## 2023-01-16 RX ADMIN — OXYCODONE HYDROCHLORIDE 10 MILLIGRAM(S): 5 TABLET ORAL at 12:17

## 2023-01-16 RX ADMIN — Medication 15 MILLIGRAM(S): at 06:31

## 2023-01-16 RX ADMIN — MORPHINE SULFATE 4 MILLIGRAM(S): 50 CAPSULE, EXTENDED RELEASE ORAL at 08:58

## 2023-01-16 NOTE — PROGRESS NOTE ADULT - ASSESSMENT
47y Female with PMH of depression, anxiety, and thyroid cancer s/p left partial thyroidectomy (1996) and central neck dissection (5/22) with recent admission at [facility] for left facial abscess p/w recurrent abscess, failed output PO abx.

## 2023-01-16 NOTE — PROGRESS NOTE ADULT - SUBJECTIVE AND OBJECTIVE BOX
Aniya Metzger MD   [facility] Medicine  Teams preferred  Cell: [phone]    Patient is a 47y old  Female who presents with a chief complaint of abscess (16 Jan 2023 14:53)      INTERVAL HPI/OVERNIGHT EVENTS: CT scan shows concern for worsening evolving abscess. Pt quite worried about bedside drainage procedure, had 2 previous bedside attempts without results. is anxious to talk to ENT    MEDICATIONS  (STANDING):  ampicillin/sulbactam  IVPB      ampicillin/sulbactam  IVPB 3 Gram(s) IV Intermittent every 6 hours  chlorhexidine 0.12% Liquid 15 milliLiter(s) Swish and Spit two times a day  chlorhexidine 0.12% Liquid 15 milliLiter(s) Swish and Spit two times a day  famotidine    Tablet 20 milliGRAM(s) Oral daily  gabapentin 200 milliGRAM(s) Oral every 8 hours  influenza   Vaccine 0.5 milliLiter(s) IntraMuscular once  levothyroxine Injectable 90 MICROGram(s) IV Push <User Schedule>  polyethylene glycol 3350 17 Gram(s) Oral daily  QUEtiapine 50 milliGRAM(s) Oral daily  senna 2 Tablet(s) Oral at bedtime  sertraline 50 milliGRAM(s) Oral daily    MEDICATIONS  (PRN):  morphine  - Injectable 4 milliGRAM(s) IV Push every 6 hours PRN Severe Pain (7 - 10)  morphine  - Injectable 2 milliGRAM(s) IV Push every 4 hours PRN Moderate Pain (4 - 6)  oxyCODONE    IR 10 milliGRAM(s) Oral every 4 hours PRN Mild Pain (1 - 3)      Allergies    No Known Allergies    Intolerances        REVIEW OF SYSTEMS:  Please see interval HPI:    Vital Signs Last 24 Hrs  T(C): 36.6 (16 Jan 2023 10:00), Max: 36.7 (15 Chaz 2023 18:17)  T(F): 97.9 (16 Jan 2023 10:00), Max: 98.1 (15 Chaz 2023 18:17)  HR: 70 (16 Jan 2023 10:00) (65 - 79)  BP: 121/73 (16 Jan 2023 10:00) (108/59 - 135/63)  BP(mean): --  RR: 18 (16 Jan 2023 10:00) (16 - 18)  SpO2: 100% (16 Jan 2023 10:00) (100% - 100%)    Parameters below as of 16 Jan 2023 10:00  Patient On (Oxygen Delivery Method): room air      I&O's Detail    15 Chaz 2023 07:01  -  16 Jan 2023 07:00  --------------------------------------------------------  IN:    IV PiggyBack: 200 mL    Oral Fluid: 240 mL  Total IN: 440 mL    OUT:    Voided (mL): 700 mL  Total OUT: 700 mL    Total NET: -260 mL            PHYSICAL EXAM:  Vital Signs Last 24 Hrs  T(C): 36.6 (16 Jan 2023 10:00), Max: 36.7 (15 Chaz 2023 18:17)  T(F): 97.9 (16 Jan 2023 10:00), Max: 98.1 (15 Chaz 2023 18:17)  HR: 70 (16 Jan 2023 10:00) (65 - 79)  BP: 121/73 (16 Jan 2023 10:00) (108/59 - 135/63)  BP(mean): --  RR: 18 (16 Jan 2023 10:00) (16 - 18)  SpO2: 100% (16 Jan 2023 10:00) (100% - 100%)    Parameters below as of 16 Jan 2023 10:00  Patient On (Oxygen Delivery Method): room air      CONSTITUTIONAL: NAD, well-developed, well-groomed  ENMT: Moist oral mucosa, no active bleeding noted. 2 teeth extracted on left upper set, drain in place, 1 lower tooth extracted, right side also tooth extracted, significant left facial swelling noted, by the angle of the mandible tracking upwards now with bruising, seems worse than yesterday  RESPIRATORY: Normal respiratory effort; lungs are clear to auscultation bilaterally  CARDIOVASCULAR: Regular rate and rhythm, normal S1 and S2, no murmur/rub/gallop;   ABDOMEN: Nontender to palpation, normoactive bowel sounds, no rebound/guarding; No hepatosplenomegaly  EXT: no edema b/l  NEUROLOGY: alert, following commands  SKIN: No rashes; no palpable lesions      LABS:                        9.5    7.25  )-----------( 228      ( 16 Jan 2023 05:05 )             29.9     16 Jan 2023 05:05    137    |  99     |  16     ----------------------------<  85     4.1     |  29     |  0.96     Ca    8.7        16 Jan 2023 05:05  Phos  5.8       16 Jan 2023 05:05  Mg     2.30      16 Jan 2023 05:05        CAPILLARY BLOOD GLUCOSE        BLOOD CULTURE    RADIOLOGY & ADDITIONAL TESTS:    Imaging Personally Reviewed:  [ ] YES     Consultant(s) Notes Reviewed:      Care Discussed with Consultants/Other Providers:

## 2023-01-16 NOTE — PROGRESS NOTE ADULT - SUBJECTIVE AND OBJECTIVE BOX
SUBJECTIVE:  Pt seen & examined at bedside. No acute overnight events. Feels stable from yesterday, with some increased swelling extending into temporal region.     ICU Vital Signs Last 24 Hrs  T(C): 36.6 (16 Jan 2023 10:00), Max: 36.7 (15 Chaz 2023 18:17)  T(F): 97.9 (16 Jan 2023 10:00), Max: 98.1 (15 Chaz 2023 18:17)  HR: 70 (16 Jan 2023 10:00) (65 - 79)  BP: 121/73 (16 Jan 2023 10:00) (108/59 - 135/63)  BP(mean): --  ABP: --  ABP(mean): --  RR: 18 (16 Jan 2023 10:00) (16 - 18)  SpO2: 100% (16 Jan 2023 10:00) (100% - 100%)    O2 Parameters below as of 16 Jan 2023 10:00  Patient On (Oxygen Delivery Method): room air      PE:  + bulky swelling of left cheek tracking superiorly to temporal region. Tender to palpation.   + intraoral penrose sutured in, no purulence/drainage noted from drain. thick debris removed from penrose    Bedside procedure: Irrigation of intraoral wound  R/B/A discussed. Patient gave verbal consent for bedside procedure. Nurse provided premedication. Patient was positioned appropriately in bed. A 20mL syringe with catheter was used to irrigate approximately 50cc of saline and around the penrose draining the left  space. A curved clamp was then used to open up the tract. Some murky fluid was expressed. Patient tolerated the procedure well.    A/P: 47F with left facial abscess s/p I&D 1/2 p/w recurrenct L absces 1/10 s/p I&D and dentral extraction 1/12. Swelling/pain stable. Not worse but doesn't appear to be improving. Abscess culture growing strep anginosa and prevotella dent. Blood cultures ngtd.  - will discuss with attending possible RTOR  - intraoral penrose drain to stay for now  - continue IV per medicine service, currently on unasyn   - ENT will follow, page with questions

## 2023-01-16 NOTE — PROGRESS NOTE ADULT - PROBLEM SELECTOR PLAN 1
ENT, dental and OMFS consulted  -s/p I&D of abscess and tooth extraction 1/12  -CT max facial shows worsenign abscess  -penrose drain in place  -bedside drainage attempted on 1/16, not successful. will plan for OR with ENT later this week  - change to regular and allow Pt discretion  -previous abscess culture grew strep anginosus and Prevotella denticola  -c/w Unasyn empirically f/u cultures, seems like strep-will likely need to extend course from when drainage is achieved/source control achieved  -f/u blood cultures  -c/w Peridex swish and spit BID  -pain control with morphine PRN, Toradol 15mg IV q8h x 2 more days  -start oxy as well prn  -c/w IV morphine and add breakthrough 01-Jul-2019

## 2023-01-17 ENCOUNTER — TRANSCRIPTION ENCOUNTER (OUTPATIENT)
Age: 48
End: 2023-01-17

## 2023-01-17 LAB
ANION GAP SERPL CALC-SCNC: 11 MMOL/L — SIGNIFICANT CHANGE UP (ref 7–14)
APTT BLD: 32.1 SEC — SIGNIFICANT CHANGE UP (ref 27–36.3)
BLD GP AB SCN SERPL QL: NEGATIVE — SIGNIFICANT CHANGE UP
BUN SERPL-MCNC: 14 MG/DL — SIGNIFICANT CHANGE UP (ref 7–23)
CALCIUM SERPL-MCNC: 8.4 MG/DL — SIGNIFICANT CHANGE UP (ref 8.4–10.5)
CHLORIDE SERPL-SCNC: 99 MMOL/L — SIGNIFICANT CHANGE UP (ref 98–107)
CO2 SERPL-SCNC: 29 MMOL/L — SIGNIFICANT CHANGE UP (ref 22–31)
CREAT SERPL-MCNC: 1.11 MG/DL — SIGNIFICANT CHANGE UP (ref 0.5–1.3)
CULTURE RESULTS: SIGNIFICANT CHANGE UP
EGFR: 61 ML/MIN/1.73M2 — SIGNIFICANT CHANGE UP
GLUCOSE SERPL-MCNC: 81 MG/DL — SIGNIFICANT CHANGE UP (ref 70–99)
HCT VFR BLD CALC: 29.8 % — LOW (ref 34.5–45)
HGB BLD-MCNC: 9.4 G/DL — LOW (ref 11.5–15.5)
INR BLD: 0.99 RATIO — SIGNIFICANT CHANGE UP (ref 0.88–1.16)
MAGNESIUM SERPL-MCNC: 2.6 MG/DL — SIGNIFICANT CHANGE UP (ref 1.6–2.6)
MCHC RBC-ENTMCNC: 31.5 GM/DL — LOW (ref 32–36)
MCHC RBC-ENTMCNC: 35.6 PG — HIGH (ref 27–34)
MCV RBC AUTO: 112.9 FL — HIGH (ref 80–100)
NRBC # BLD: 0 /100 WBCS — SIGNIFICANT CHANGE UP (ref 0–0)
NRBC # FLD: 0 K/UL — SIGNIFICANT CHANGE UP (ref 0–0)
PHOSPHATE SERPL-MCNC: 5.1 MG/DL — HIGH (ref 2.5–4.5)
PLATELET # BLD AUTO: 238 K/UL — SIGNIFICANT CHANGE UP (ref 150–400)
POTASSIUM SERPL-MCNC: 4.1 MMOL/L — SIGNIFICANT CHANGE UP (ref 3.5–5.3)
POTASSIUM SERPL-SCNC: 4.1 MMOL/L — SIGNIFICANT CHANGE UP (ref 3.5–5.3)
PROTHROM AB SERPL-ACNC: 11.5 SEC — SIGNIFICANT CHANGE UP (ref 10.5–13.4)
RBC # BLD: 2.64 M/UL — LOW (ref 3.8–5.2)
RBC # FLD: 12.8 % — SIGNIFICANT CHANGE UP (ref 10.3–14.5)
RH IG SCN BLD-IMP: POSITIVE — SIGNIFICANT CHANGE UP
SARS-COV-2 RNA SPEC QL NAA+PROBE: SIGNIFICANT CHANGE UP
SODIUM SERPL-SCNC: 139 MMOL/L — SIGNIFICANT CHANGE UP (ref 135–145)
SPECIMEN SOURCE: SIGNIFICANT CHANGE UP
WBC # BLD: 6.66 K/UL — SIGNIFICANT CHANGE UP (ref 3.8–10.5)
WBC # FLD AUTO: 6.66 K/UL — SIGNIFICANT CHANGE UP (ref 3.8–10.5)

## 2023-01-17 PROCEDURE — 99233 SBSQ HOSP IP/OBS HIGH 50: CPT

## 2023-01-17 RX ORDER — SODIUM CHLORIDE 9 MG/ML
1000 INJECTION INTRAMUSCULAR; INTRAVENOUS; SUBCUTANEOUS
Refills: 0 | Status: DISCONTINUED | OUTPATIENT
Start: 2023-01-17 | End: 2023-02-01

## 2023-01-17 RX ORDER — MORPHINE SULFATE 50 MG/1
4 CAPSULE, EXTENDED RELEASE ORAL EVERY 4 HOURS
Refills: 0 | Status: DISCONTINUED | OUTPATIENT
Start: 2023-01-17 | End: 2023-01-20

## 2023-01-17 RX ORDER — MORPHINE SULFATE 50 MG/1
2 CAPSULE, EXTENDED RELEASE ORAL ONCE
Refills: 0 | Status: DISCONTINUED | OUTPATIENT
Start: 2023-01-17 | End: 2023-01-17

## 2023-01-17 RX ADMIN — GABAPENTIN 200 MILLIGRAM(S): 400 CAPSULE ORAL at 05:09

## 2023-01-17 RX ADMIN — QUETIAPINE FUMARATE 50 MILLIGRAM(S): 200 TABLET, FILM COATED ORAL at 22:05

## 2023-01-17 RX ADMIN — MORPHINE SULFATE 2 MILLIGRAM(S): 50 CAPSULE, EXTENDED RELEASE ORAL at 13:14

## 2023-01-17 RX ADMIN — Medication 90 MICROGRAM(S): at 06:39

## 2023-01-17 RX ADMIN — MORPHINE SULFATE 4 MILLIGRAM(S): 50 CAPSULE, EXTENDED RELEASE ORAL at 15:02

## 2023-01-17 RX ADMIN — AMPICILLIN SODIUM AND SULBACTAM SODIUM 200 GRAM(S): 250; 125 INJECTION, POWDER, FOR SUSPENSION INTRAMUSCULAR; INTRAVENOUS at 08:54

## 2023-01-17 RX ADMIN — OXYCODONE HYDROCHLORIDE 10 MILLIGRAM(S): 5 TABLET ORAL at 07:51

## 2023-01-17 RX ADMIN — MORPHINE SULFATE 4 MILLIGRAM(S): 50 CAPSULE, EXTENDED RELEASE ORAL at 22:20

## 2023-01-17 RX ADMIN — MORPHINE SULFATE 4 MILLIGRAM(S): 50 CAPSULE, EXTENDED RELEASE ORAL at 02:59

## 2023-01-17 RX ADMIN — POLYETHYLENE GLYCOL 3350 17 GRAM(S): 17 POWDER, FOR SOLUTION ORAL at 11:42

## 2023-01-17 RX ADMIN — MORPHINE SULFATE 2 MILLIGRAM(S): 50 CAPSULE, EXTENDED RELEASE ORAL at 14:15

## 2023-01-17 RX ADMIN — GABAPENTIN 200 MILLIGRAM(S): 400 CAPSULE ORAL at 15:01

## 2023-01-17 RX ADMIN — CHLORHEXIDINE GLUCONATE 15 MILLILITER(S): 213 SOLUTION TOPICAL at 05:09

## 2023-01-17 RX ADMIN — OXYCODONE HYDROCHLORIDE 10 MILLIGRAM(S): 5 TABLET ORAL at 07:04

## 2023-01-17 RX ADMIN — CHLORHEXIDINE GLUCONATE 15 MILLILITER(S): 213 SOLUTION TOPICAL at 17:13

## 2023-01-17 RX ADMIN — SENNA PLUS 2 TABLET(S): 8.6 TABLET ORAL at 22:05

## 2023-01-17 RX ADMIN — MORPHINE SULFATE 4 MILLIGRAM(S): 50 CAPSULE, EXTENDED RELEASE ORAL at 10:30

## 2023-01-17 RX ADMIN — AMPICILLIN SODIUM AND SULBACTAM SODIUM 200 GRAM(S): 250; 125 INJECTION, POWDER, FOR SUSPENSION INTRAMUSCULAR; INTRAVENOUS at 22:03

## 2023-01-17 RX ADMIN — AMPICILLIN SODIUM AND SULBACTAM SODIUM 200 GRAM(S): 250; 125 INJECTION, POWDER, FOR SUSPENSION INTRAMUSCULAR; INTRAVENOUS at 02:57

## 2023-01-17 RX ADMIN — GABAPENTIN 200 MILLIGRAM(S): 400 CAPSULE ORAL at 22:04

## 2023-01-17 RX ADMIN — MORPHINE SULFATE 4 MILLIGRAM(S): 50 CAPSULE, EXTENDED RELEASE ORAL at 22:04

## 2023-01-17 RX ADMIN — MORPHINE SULFATE 4 MILLIGRAM(S): 50 CAPSULE, EXTENDED RELEASE ORAL at 11:15

## 2023-01-17 RX ADMIN — MORPHINE SULFATE 4 MILLIGRAM(S): 50 CAPSULE, EXTENDED RELEASE ORAL at 03:15

## 2023-01-17 RX ADMIN — AMPICILLIN SODIUM AND SULBACTAM SODIUM 200 GRAM(S): 250; 125 INJECTION, POWDER, FOR SUSPENSION INTRAMUSCULAR; INTRAVENOUS at 18:39

## 2023-01-17 RX ADMIN — OXYCODONE HYDROCHLORIDE 10 MILLIGRAM(S): 5 TABLET ORAL at 19:00

## 2023-01-17 RX ADMIN — SERTRALINE 50 MILLIGRAM(S): 25 TABLET, FILM COATED ORAL at 22:04

## 2023-01-17 RX ADMIN — OXYCODONE HYDROCHLORIDE 10 MILLIGRAM(S): 5 TABLET ORAL at 18:07

## 2023-01-17 RX ADMIN — FAMOTIDINE 20 MILLIGRAM(S): 10 INJECTION INTRAVENOUS at 15:02

## 2023-01-17 RX ADMIN — MORPHINE SULFATE 4 MILLIGRAM(S): 50 CAPSULE, EXTENDED RELEASE ORAL at 16:00

## 2023-01-17 NOTE — PROGRESS NOTE ADULT - PROBLEM SELECTOR PLAN 1
ENT, dental and OMFS consulted  -s/p I&D of abscess and tooth extraction 1/12  -CT max facial shows worsenign abscess  -penrose drain in place  -bedside drainage attempted on 1/16, not successful. will plan for OR  tomorrow 1/18, NPO at [facility] for procedure   -previous abscess culture grew strep anginosus and Prevotella denticola  -c/w Unasyn empirically f/u cultures, seems like strep-will likely need to extend course from when drainage is achieved/source control achieved  -f/u blood cultures  -c/w Peridex swish and spit BID  -pain control with morphine PRN, Toradol 15mg IV q8h x 2 more days  -start oxy as well prn  -c/w IV morphine and add breakthrough

## 2023-01-17 NOTE — CHART NOTE - NSCHARTNOTEFT_GEN_A_CORE
Per discussion with ENT, penrose drain was removed at bedside today. ENT recommended to make pt NPO at midnight tonight as pt may be going as add on to OR tomorrow, TBD.

## 2023-01-17 NOTE — PROGRESS NOTE ADULT - SUBJECTIVE AND OBJECTIVE BOX
[facility] Division of Hospital Medicine  [employer] [facility] [employer]  Pager [phone]  Available via MS Teams    SUBJECTIVE / OVERNIGHT EVENTS: Still with significant mouth pain, improved with morphine.   ADDITIONAL REVIEW OF SYSTEMS:    MEDICATIONS  (STANDING):  ampicillin/sulbactam  IVPB      ampicillin/sulbactam  IVPB 3 Gram(s) IV Intermittent every 6 hours  chlorhexidine 0.12% Liquid 15 milliLiter(s) Swish and Spit two times a day  famotidine    Tablet 20 milliGRAM(s) Oral daily  gabapentin 200 milliGRAM(s) Oral every 8 hours  influenza   Vaccine 0.5 milliLiter(s) IntraMuscular once  levothyroxine Injectable 90 MICROGram(s) IV Push <User Schedule>  polyethylene glycol 3350 17 Gram(s) Oral daily  QUEtiapine 50 milliGRAM(s) Oral daily  senna 2 Tablet(s) Oral at bedtime  sertraline 50 milliGRAM(s) Oral daily    MEDICATIONS  (PRN):  morphine  - Injectable 2 milliGRAM(s) IV Push every 4 hours PRN Moderate Pain (4 - 6)  morphine  - Injectable 4 milliGRAM(s) IV Push every 4 hours PRN Severe Pain (7 - 10)  oxyCODONE    IR 10 milliGRAM(s) Oral every 4 hours PRN Mild Pain (1 - 3)      I&O's Summary      PHYSICAL EXAM:  Vital Signs Last 24 Hrs  T(C): 36.6 (17 Jan 2023 09:58), Max: 36.7 (17 Jan 2023 02:55)  T(F): 97.8 (17 Jan 2023 09:58), Max: 98 (17 Jan 2023 02:55)  HR: 62 (17 Jan 2023 09:58) (60 - 75)  BP: 105/80 (17 Jan 2023 09:58) (105/61 - 109/65)  BP(mean): --  RR: 17 (17 Jan 2023 09:58) (16 - 18)  SpO2: 98% (17 Jan 2023 09:58) (98% - 98%)    Parameters below as of 17 Jan 2023 09:58  Patient On (Oxygen Delivery Method): room air      CONSTITUTIONAL: mild distress due to pain   EYES: PERRLA; conjunctiva and sclera clear  ENMT: Moist oral mucosa, significant swelling more on Left than right. Erythema and discoloration noted on skin  NECK: Supple, no palpable masses; no thyromegaly  RESPIRATORY: Normal respiratory effort; lungs are clear to auscultation bilaterally  CARDIOVASCULAR: Regular rate and rhythm, normal S1 and S2, no murmur/rub/gallop; No lower extremity edema; Peripheral pulses are 2+ bilaterally  ABDOMEN: Nontender to palpation, normoactive bowel sounds, no rebound/guarding; No hepatosplenomegaly  MUSCULOSKELETAL:  Normal gait; no clubbing or cyanosis of digits; no joint swelling or tenderness to palpation  PSYCH: A+O to person, place, and time; affect appropriate  NEUROLOGY: CN 2-12 are intact and symmetric; no gross sensory deficits   SKIN: No rashes; no palpable lesions    LABS:                        9.4    6.66  )-----------( 238      ( 17 Jan 2023 04:26 )             29.8     01-17    139  |  99  |  14  ----------------------------<  81  4.1   |  29  |  1.11    Ca    8.4      17 Jan 2023 04:26  Phos  5.1     01-17  Mg     2.60     01-17      PT/INR - ( 17 Jan 2023 07:10 )   PT: 11.5 sec;   INR: 0.99 ratio         PTT - ( 17 Jan 2023 07:10 )  PTT:32.1 sec          COVID-19 PCR: NotDetec (17 Jan 2023 07:30)  COVID-19 PCR: NotDetec (03 Jan 2023 02:00)      RADIOLOGY & ADDITIONAL TESTS:  New Results Reviewed Today:   New Imaging Personally Reviewed Today:  New Electrocardiogram Personally Reviewed Today:  Prior or Outpatient Records Reviewed Today:    COMMUNICATION:  Care Discussed with Consultants/Other Providers and Details of Discussion:  Discussions with Patient/Family:  PCP Communication:

## 2023-01-17 NOTE — PROGRESS NOTE ADULT - ASSESSMENT
47y Female with PMH of depression, anxiety, and thyroid cancer s/p left partial thyroidectomy (1996) and central neck dissection (5/22) with recent admission at [facility] for left facial abscess p/w recurrent abscess, failed output PO abx.

## 2023-01-17 NOTE — PROGRESS NOTE ADULT - SUBJECTIVE AND OBJECTIVE BOX
INTERVAL HX:  Patient seen and examined. Notes minimal improvement. NPO for possible OR today.     Vital Signs Last 24 Hrs  T(C): 36.7 (17 Jan 2023 05:45), Max: 36.7 (17 Jan 2023 02:55)  T(F): 98 (17 Jan 2023 05:45), Max: 98 (17 Jan 2023 02:55)  HR: 60 (17 Jan 2023 05:45) (60 - 75)  BP: 105/65 (17 Jan 2023 05:45) (105/61 - 121/73)  BP(mean): --  RR: 17 (17 Jan 2023 05:45) (16 - 18)  SpO2: 98% (17 Jan 2023 05:45) (95% - 100%)    Parameters below as of 17 Jan 2023 05:45  Patient On (Oxygen Delivery Method): room air          NAD, lying in bed  Neck flat and supple; no induration or collection  + bulky swelling of left cheek tracking superiorly to temporal region. Tender to palpation.   + intraoral penrose sutured in, no purulence/drainage noted from drain. thick debris removed from penrose when probed with q tip.     A/P:   47F with left  space tracking to temporal region s/p I&D 1/2 p/w recurrent L abscess 1/10 s/p I&D and dental extraction 1/12. Penrose remains in place with minimal improvement. Abscess culture growing strep anginosa and prevotella dent. Blood cultures ngtd.    - will discuss with attending possible RTOR, please keep NPO for AM  - intraoral penrose drain to stay for now  - continue IV per medicine service, currently on unasyn   - continue peridex rinses   - ENT will follow, page with questions

## 2023-01-18 LAB
ANION GAP SERPL CALC-SCNC: 10 MMOL/L — SIGNIFICANT CHANGE UP (ref 7–14)
BUN SERPL-MCNC: 12 MG/DL — SIGNIFICANT CHANGE UP (ref 7–23)
CALCIUM SERPL-MCNC: 8.5 MG/DL — SIGNIFICANT CHANGE UP (ref 8.4–10.5)
CHLORIDE SERPL-SCNC: 102 MMOL/L — SIGNIFICANT CHANGE UP (ref 98–107)
CO2 SERPL-SCNC: 27 MMOL/L — SIGNIFICANT CHANGE UP (ref 22–31)
CREAT SERPL-MCNC: 0.88 MG/DL — SIGNIFICANT CHANGE UP (ref 0.5–1.3)
EGFR: 81 ML/MIN/1.73M2 — SIGNIFICANT CHANGE UP
GLUCOSE SERPL-MCNC: 89 MG/DL — SIGNIFICANT CHANGE UP (ref 70–99)
HCT VFR BLD CALC: 32.3 % — LOW (ref 34.5–45)
HGB BLD-MCNC: 10.2 G/DL — LOW (ref 11.5–15.5)
INR BLD: 1.03 RATIO — SIGNIFICANT CHANGE UP (ref 0.88–1.16)
MAGNESIUM SERPL-MCNC: 2.3 MG/DL — SIGNIFICANT CHANGE UP (ref 1.6–2.6)
MCHC RBC-ENTMCNC: 31.6 GM/DL — LOW (ref 32–36)
MCHC RBC-ENTMCNC: 35.8 PG — HIGH (ref 27–34)
MCV RBC AUTO: 113.3 FL — HIGH (ref 80–100)
NRBC # BLD: 0 /100 WBCS — SIGNIFICANT CHANGE UP (ref 0–0)
NRBC # FLD: 0 K/UL — SIGNIFICANT CHANGE UP (ref 0–0)
PHOSPHATE SERPL-MCNC: 4.6 MG/DL — HIGH (ref 2.5–4.5)
PLATELET # BLD AUTO: 257 K/UL — SIGNIFICANT CHANGE UP (ref 150–400)
POTASSIUM SERPL-MCNC: 4.3 MMOL/L — SIGNIFICANT CHANGE UP (ref 3.5–5.3)
POTASSIUM SERPL-SCNC: 4.3 MMOL/L — SIGNIFICANT CHANGE UP (ref 3.5–5.3)
PROTHROM AB SERPL-ACNC: 11.9 SEC — SIGNIFICANT CHANGE UP (ref 10.5–13.4)
RBC # BLD: 2.85 M/UL — LOW (ref 3.8–5.2)
RBC # FLD: 12.7 % — SIGNIFICANT CHANGE UP (ref 10.3–14.5)
SODIUM SERPL-SCNC: 139 MMOL/L — SIGNIFICANT CHANGE UP (ref 135–145)
WBC # BLD: 5.87 K/UL — SIGNIFICANT CHANGE UP (ref 3.8–10.5)
WBC # FLD AUTO: 5.87 K/UL — SIGNIFICANT CHANGE UP (ref 3.8–10.5)

## 2023-01-18 PROCEDURE — 99233 SBSQ HOSP IP/OBS HIGH 50: CPT

## 2023-01-18 PROCEDURE — 40801 DRAINAGE OF MOUTH LESION: CPT | Mod: GC

## 2023-01-18 DEVICE — LIGATING CLIPS WECK HORIZON SMALL-WIDE (RED) 24: Type: IMPLANTABLE DEVICE | Status: FUNCTIONAL

## 2023-01-18 DEVICE — SURGICEL 2 X 14": Type: IMPLANTABLE DEVICE | Status: FUNCTIONAL

## 2023-01-18 DEVICE — LIGATING CLIPS WECK HORIZON MEDIUM (BLUE) 24: Type: IMPLANTABLE DEVICE | Status: FUNCTIONAL

## 2023-01-18 DEVICE — SURGIFLO MATRIX WITH THROMBIN KIT: Type: IMPLANTABLE DEVICE | Status: FUNCTIONAL

## 2023-01-18 RX ORDER — OXYCODONE HYDROCHLORIDE 5 MG/1
5 TABLET ORAL ONCE
Refills: 0 | Status: DISCONTINUED | OUTPATIENT
Start: 2023-01-18 | End: 2023-01-18

## 2023-01-18 RX ORDER — FENTANYL CITRATE 50 UG/ML
25 INJECTION INTRAVENOUS
Refills: 0 | Status: DISCONTINUED | OUTPATIENT
Start: 2023-01-18 | End: 2023-01-18

## 2023-01-18 RX ORDER — FENTANYL CITRATE 50 UG/ML
50 INJECTION INTRAVENOUS
Refills: 0 | Status: DISCONTINUED | OUTPATIENT
Start: 2023-01-18 | End: 2023-01-18

## 2023-01-18 RX ORDER — ONDANSETRON 8 MG/1
4 TABLET, FILM COATED ORAL ONCE
Refills: 0 | Status: DISCONTINUED | OUTPATIENT
Start: 2023-01-18 | End: 2023-01-18

## 2023-01-18 RX ORDER — HYDROMORPHONE HYDROCHLORIDE 2 MG/ML
0.5 INJECTION INTRAMUSCULAR; INTRAVENOUS; SUBCUTANEOUS ONCE
Refills: 0 | Status: DISCONTINUED | OUTPATIENT
Start: 2023-01-18 | End: 2023-01-18

## 2023-01-18 RX ADMIN — MORPHINE SULFATE 2 MILLIGRAM(S): 50 CAPSULE, EXTENDED RELEASE ORAL at 18:20

## 2023-01-18 RX ADMIN — CHLORHEXIDINE GLUCONATE 15 MILLILITER(S): 213 SOLUTION TOPICAL at 18:10

## 2023-01-18 RX ADMIN — OXYCODONE HYDROCHLORIDE 10 MILLIGRAM(S): 5 TABLET ORAL at 16:31

## 2023-01-18 RX ADMIN — MORPHINE SULFATE 4 MILLIGRAM(S): 50 CAPSULE, EXTENDED RELEASE ORAL at 09:09

## 2023-01-18 RX ADMIN — SENNA PLUS 2 TABLET(S): 8.6 TABLET ORAL at 22:58

## 2023-01-18 RX ADMIN — GABAPENTIN 200 MILLIGRAM(S): 400 CAPSULE ORAL at 22:51

## 2023-01-18 RX ADMIN — OXYCODONE HYDROCHLORIDE 5 MILLIGRAM(S): 5 TABLET ORAL at 21:20

## 2023-01-18 RX ADMIN — AMPICILLIN SODIUM AND SULBACTAM SODIUM 200 GRAM(S): 250; 125 INJECTION, POWDER, FOR SUSPENSION INTRAMUSCULAR; INTRAVENOUS at 03:58

## 2023-01-18 RX ADMIN — MORPHINE SULFATE 4 MILLIGRAM(S): 50 CAPSULE, EXTENDED RELEASE ORAL at 13:29

## 2023-01-18 RX ADMIN — OXYCODONE HYDROCHLORIDE 5 MILLIGRAM(S): 5 TABLET ORAL at 21:50

## 2023-01-18 RX ADMIN — HYDROMORPHONE HYDROCHLORIDE 0.5 MILLIGRAM(S): 2 INJECTION INTRAMUSCULAR; INTRAVENOUS; SUBCUTANEOUS at 21:05

## 2023-01-18 RX ADMIN — POLYETHYLENE GLYCOL 3350 17 GRAM(S): 17 POWDER, FOR SOLUTION ORAL at 22:53

## 2023-01-18 RX ADMIN — CHLORHEXIDINE GLUCONATE 15 MILLILITER(S): 213 SOLUTION TOPICAL at 05:48

## 2023-01-18 RX ADMIN — SODIUM CHLORIDE 100 MILLILITER(S): 9 INJECTION INTRAMUSCULAR; INTRAVENOUS; SUBCUTANEOUS at 04:22

## 2023-01-18 RX ADMIN — MORPHINE SULFATE 4 MILLIGRAM(S): 50 CAPSULE, EXTENDED RELEASE ORAL at 23:22

## 2023-01-18 RX ADMIN — AMPICILLIN SODIUM AND SULBACTAM SODIUM 200 GRAM(S): 250; 125 INJECTION, POWDER, FOR SUSPENSION INTRAMUSCULAR; INTRAVENOUS at 16:22

## 2023-01-18 RX ADMIN — MORPHINE SULFATE 4 MILLIGRAM(S): 50 CAPSULE, EXTENDED RELEASE ORAL at 04:35

## 2023-01-18 RX ADMIN — HYDROMORPHONE HYDROCHLORIDE 0.5 MILLIGRAM(S): 2 INJECTION INTRAMUSCULAR; INTRAVENOUS; SUBCUTANEOUS at 21:20

## 2023-01-18 RX ADMIN — GABAPENTIN 200 MILLIGRAM(S): 400 CAPSULE ORAL at 13:29

## 2023-01-18 RX ADMIN — QUETIAPINE FUMARATE 50 MILLIGRAM(S): 200 TABLET, FILM COATED ORAL at 22:52

## 2023-01-18 RX ADMIN — SERTRALINE 50 MILLIGRAM(S): 25 TABLET, FILM COATED ORAL at 22:52

## 2023-01-18 RX ADMIN — Medication 90 MICROGRAM(S): at 06:49

## 2023-01-18 RX ADMIN — AMPICILLIN SODIUM AND SULBACTAM SODIUM 200 GRAM(S): 250; 125 INJECTION, POWDER, FOR SUSPENSION INTRAMUSCULAR; INTRAVENOUS at 09:10

## 2023-01-18 RX ADMIN — FAMOTIDINE 20 MILLIGRAM(S): 10 INJECTION INTRAVENOUS at 13:56

## 2023-01-18 RX ADMIN — GABAPENTIN 200 MILLIGRAM(S): 400 CAPSULE ORAL at 05:48

## 2023-01-18 RX ADMIN — MORPHINE SULFATE 4 MILLIGRAM(S): 50 CAPSULE, EXTENDED RELEASE ORAL at 23:35

## 2023-01-18 RX ADMIN — MORPHINE SULFATE 4 MILLIGRAM(S): 50 CAPSULE, EXTENDED RELEASE ORAL at 04:17

## 2023-01-18 RX ADMIN — AMPICILLIN SODIUM AND SULBACTAM SODIUM 200 GRAM(S): 250; 125 INJECTION, POWDER, FOR SUSPENSION INTRAMUSCULAR; INTRAVENOUS at 22:59

## 2023-01-18 NOTE — PROGRESS NOTE ADULT - ASSESSMENT
47y Female with PMH of depression, anxiety, and thyroid cancer s/p left partial thyroidectomy (1996) and central neck dissection (5/22) with recent admission at [facility] for left facial abscess p/w recurrent abscess, failed output PO abx. Now planned for OR for 1/18with ENT

## 2023-01-18 NOTE — PROGRESS NOTE ADULT - SUBJECTIVE AND OBJECTIVE BOX
[facility] Division of Hospital Medicine  Pachecolily Ana PEOPLES  Pager [phone]  Available via MS Teams    SUBJECTIVE / OVERNIGHT EVENTS: NAEON. Patient still with significant pain, patient     ADDITIONAL REVIEW OF SYSTEMS:    MEDICATIONS  (STANDING):  ampicillin/sulbactam  IVPB      ampicillin/sulbactam  IVPB 3 Gram(s) IV Intermittent every 6 hours  chlorhexidine 0.12% Liquid 15 milliLiter(s) Swish and Spit two times a day  famotidine    Tablet 20 milliGRAM(s) Oral daily  gabapentin 200 milliGRAM(s) Oral every 8 hours  influenza   Vaccine 0.5 milliLiter(s) IntraMuscular once  levothyroxine Injectable 90 MICROGram(s) IV Push <User Schedule>  polyethylene glycol 3350 17 Gram(s) Oral daily  QUEtiapine 50 milliGRAM(s) Oral daily  senna 2 Tablet(s) Oral at bedtime  sertraline 50 milliGRAM(s) Oral daily  sodium chloride 0.9%. 1000 milliLiter(s) (100 mL/Hr) IV Continuous <Continuous>    MEDICATIONS  (PRN):  morphine  - Injectable 2 milliGRAM(s) IV Push every 4 hours PRN Moderate Pain (4 - 6)  morphine  - Injectable 4 milliGRAM(s) IV Push every 4 hours PRN Severe Pain (7 - 10)  oxyCODONE    IR 10 milliGRAM(s) Oral every 4 hours PRN Mild Pain (1 - 3)      I&O's Summary      PHYSICAL EXAM:  Vital Signs Last 24 Hrs  T(C): 36.7 (18 Jan 2023 09:56), Max: 36.9 (17 Jan 2023 18:33)  T(F): 98.1 (18 Jan 2023 09:56), Max: 98.4 (17 Jan 2023 18:33)  HR: 74 (18 Jan 2023 09:56) (68 - 89)  BP: 129/61 (18 Jan 2023 09:56) (108/61 - 129/61)  BP(mean): --  RR: 17 (18 Jan 2023 09:56) (16 - 17)  SpO2: 100% (18 Jan 2023 09:56) (97% - 100%)    Parameters below as of 18 Jan 2023 09:56  Patient On (Oxygen Delivery Method): room air      CONSTITUTIONAL: mild distress due to pain   EYES: PERRLA; conjunctiva and sclera clear  ENMT: Moist oral mucosa, significant swelling more on Left than right. Tender to palpation. Erythema and discoloration noted on skin  NECK: Supple, no palpable masses  RESPIRATORY: Normal respiratory effort; lungs are clear to auscultation bilaterally  CARDIOVASCULAR: Regular rate and rhythm, normal S1 and S2, no murmur/rub/gallop; No lower extremity edema; Peripheral pulses are 2+ bilaterally  ABDOMEN: Nontender to palpation, normoactive bowel sounds, no rebound/guarding; MUSCULOSKELETAL:  Normal gait; no joint swelling or tenderness to palpation  PSYCH: A+O to person, place, and time; affect appropriate  NEUROLOGY: CN 2-12 are intact and symmetric; no gross sensory deficits   SKIN: No rashes; no palpable lesions    LABS:                        10.2   5.87  )-----------( 257      ( 18 Jan 2023 08:05 )             32.3     01-18    139  |  102  |  12  ----------------------------<  89  4.3   |  27  |  0.88    Ca    8.5      18 Jan 2023 08:05  Phos  4.6     01-18  Mg     2.30     01-18      PT/INR - ( 18 Jan 2023 08:05 )   PT: 11.9 sec;   INR: 1.03 ratio         PTT - ( 17 Jan 2023 07:10 )  PTT:32.1 sec          COVID-19 PCR: NotDetec (17 Jan 2023 07:30)  COVID-19 PCR: NotDetec (03 Jan 2023 02:00)          COMMUNICATION:  Care Discussed with Consultants/Other Providers and Details of Discussion: Discussed with ENT, patient planned for OR today for abcess

## 2023-01-18 NOTE — PROGRESS NOTE ADULT - PROBLEM SELECTOR PLAN 1
ENT, dental and OMFS consulted, /p I&D of abscess and tooth extraction 1/12; CT max facial shows worsening abscess  - previous abscess culture grew strep anginosus and Prevotella denticola  - c/w Unasyn, currently on day 7 will likely complete a total 14 day course once abscess drained   - Discussed with ENT resistant that patient is an add on case for this afternoon; will touch base with ENT to ensure abscess removal as need source control before deescalating antibiotics  - Abscess culture growing, Abscess culture showing Strep mitis oralis  - BCX NGTD  - c/w Peridex swish and spit BID  - pain control with morphine 2mg and 4mg q4hrs PRN,   - c/w oxy 10mg PRN

## 2023-01-18 NOTE — PRE-OP CHECKLIST - WEIGHT IN KG
Detail Level: Detailed
Detail Level: Simple
Detail Level: Zone
Detail Level: Generalized
80.7
81.1
80.3

## 2023-01-18 NOTE — BRIEF OPERATIVE NOTE - NSICDXBRIEFPOSTOP_GEN_ALL_CORE_FT
POST-OP DIAGNOSIS:  Facial abscess 12-Jan-2023 10:02:41  Cholo Pham  
POST-OP DIAGNOSIS:  Facial abscess 12-Jan-2023 10:02:41  Cholo Pham

## 2023-01-18 NOTE — BRIEF OPERATIVE NOTE - NSICDXBRIEFPREOP_GEN_ALL_CORE_FT
PRE-OP DIAGNOSIS:  Facial abscess 12-Jan-2023 10:02:12  Cholo Pham  
PRE-OP DIAGNOSIS:  Facial abscess 12-Jan-2023 10:02:12  Cholo Pham

## 2023-01-18 NOTE — BRIEF OPERATIVE NOTE - OPERATION/FINDINGS
-murky fluid drained from left massticator space, tracking up to temporal region  - teeth extracted
Extraction of teeth # 2,3,4, 13, 19
hematoma evacuated
Extraction of teeth # 2,3,4,13,19, placement of dissolvable sutures

## 2023-01-18 NOTE — BRIEF OPERATIVE NOTE - NSICDXBRIEFPROCEDURE_GEN_ALL_CORE_FT
PROCEDURES:  Extraction tooth 12-Jan-2023 10:02:00  Cholo Pham  Incision and drainage, neck 12-Jan-2023 10:02:25  Cholo Pham  
PROCEDURES:  Extraction tooth 12-Jan-2023 10:02:00  Cholo Pham  Incision and drainage, neck 12-Jan-2023 10:02:25  Cholo Pham  
PROCEDURES:  Extraction tooth 12-Jan-2023 10:02:00  Cholo Pham  
PROCEDURES:  Extraction tooth 12-Jan-2023 10:02:00  Cholo Pham

## 2023-01-19 DIAGNOSIS — G43.909 MIGRAINE, UNSPECIFIED, NOT INTRACTABLE, WITHOUT STATUS MIGRAINOSUS: ICD-10-CM

## 2023-01-19 LAB
ANION GAP SERPL CALC-SCNC: 12 MMOL/L — SIGNIFICANT CHANGE UP (ref 7–14)
BUN SERPL-MCNC: 9 MG/DL — SIGNIFICANT CHANGE UP (ref 7–23)
CALCIUM SERPL-MCNC: 8.7 MG/DL — SIGNIFICANT CHANGE UP (ref 8.4–10.5)
CHLORIDE SERPL-SCNC: 102 MMOL/L — SIGNIFICANT CHANGE UP (ref 98–107)
CO2 SERPL-SCNC: 24 MMOL/L — SIGNIFICANT CHANGE UP (ref 22–31)
CREAT SERPL-MCNC: 0.83 MG/DL — SIGNIFICANT CHANGE UP (ref 0.5–1.3)
EGFR: 87 ML/MIN/1.73M2 — SIGNIFICANT CHANGE UP
GLUCOSE SERPL-MCNC: 153 MG/DL — HIGH (ref 70–99)
HCT VFR BLD CALC: 32.2 % — LOW (ref 34.5–45)
HGB BLD-MCNC: 10.3 G/DL — LOW (ref 11.5–15.5)
MAGNESIUM SERPL-MCNC: 2 MG/DL — SIGNIFICANT CHANGE UP (ref 1.6–2.6)
MCHC RBC-ENTMCNC: 32 GM/DL — SIGNIFICANT CHANGE UP (ref 32–36)
MCHC RBC-ENTMCNC: 36 PG — HIGH (ref 27–34)
MCV RBC AUTO: 112.6 FL — HIGH (ref 80–100)
NRBC # BLD: 0 /100 WBCS — SIGNIFICANT CHANGE UP (ref 0–0)
NRBC # FLD: 0 K/UL — SIGNIFICANT CHANGE UP (ref 0–0)
PHOSPHATE SERPL-MCNC: 3.9 MG/DL — SIGNIFICANT CHANGE UP (ref 2.5–4.5)
PLATELET # BLD AUTO: 273 K/UL — SIGNIFICANT CHANGE UP (ref 150–400)
POTASSIUM SERPL-MCNC: 4.2 MMOL/L — SIGNIFICANT CHANGE UP (ref 3.5–5.3)
POTASSIUM SERPL-SCNC: 4.2 MMOL/L — SIGNIFICANT CHANGE UP (ref 3.5–5.3)
RBC # BLD: 2.86 M/UL — LOW (ref 3.8–5.2)
RBC # FLD: 12.6 % — SIGNIFICANT CHANGE UP (ref 10.3–14.5)
SODIUM SERPL-SCNC: 138 MMOL/L — SIGNIFICANT CHANGE UP (ref 135–145)
WBC # BLD: 7.52 K/UL — SIGNIFICANT CHANGE UP (ref 3.8–10.5)
WBC # FLD AUTO: 7.52 K/UL — SIGNIFICANT CHANGE UP (ref 3.8–10.5)

## 2023-01-19 PROCEDURE — 99233 SBSQ HOSP IP/OBS HIGH 50: CPT

## 2023-01-19 RX ORDER — CHLORHEXIDINE GLUCONATE 213 G/1000ML
15 SOLUTION TOPICAL
Refills: 0 | Status: DISCONTINUED | OUTPATIENT
Start: 2023-01-19 | End: 2023-01-26

## 2023-01-19 RX ORDER — AMPICILLIN SODIUM AND SULBACTAM SODIUM 250; 125 MG/ML; MG/ML
3 INJECTION, POWDER, FOR SUSPENSION INTRAMUSCULAR; INTRAVENOUS EVERY 6 HOURS
Refills: 0 | Status: DISCONTINUED | OUTPATIENT
Start: 2023-01-19 | End: 2023-01-26

## 2023-01-19 RX ORDER — OXYCODONE HYDROCHLORIDE 5 MG/1
10 TABLET ORAL EVERY 4 HOURS
Refills: 0 | Status: DISCONTINUED | OUTPATIENT
Start: 2023-01-19 | End: 2023-01-23

## 2023-01-19 RX ORDER — ACETAMINOPHEN 500 MG
650 TABLET ORAL EVERY 6 HOURS
Refills: 0 | Status: DISCONTINUED | OUTPATIENT
Start: 2023-01-19 | End: 2023-01-22

## 2023-01-19 RX ADMIN — GABAPENTIN 200 MILLIGRAM(S): 400 CAPSULE ORAL at 05:34

## 2023-01-19 RX ADMIN — AMPICILLIN SODIUM AND SULBACTAM SODIUM 200 GRAM(S): 250; 125 INJECTION, POWDER, FOR SUSPENSION INTRAMUSCULAR; INTRAVENOUS at 11:56

## 2023-01-19 RX ADMIN — MORPHINE SULFATE 4 MILLIGRAM(S): 50 CAPSULE, EXTENDED RELEASE ORAL at 21:21

## 2023-01-19 RX ADMIN — MORPHINE SULFATE 4 MILLIGRAM(S): 50 CAPSULE, EXTENDED RELEASE ORAL at 21:35

## 2023-01-19 RX ADMIN — AMPICILLIN SODIUM AND SULBACTAM SODIUM 200 GRAM(S): 250; 125 INJECTION, POWDER, FOR SUSPENSION INTRAMUSCULAR; INTRAVENOUS at 18:03

## 2023-01-19 RX ADMIN — SERTRALINE 50 MILLIGRAM(S): 25 TABLET, FILM COATED ORAL at 21:22

## 2023-01-19 RX ADMIN — SENNA PLUS 2 TABLET(S): 8.6 TABLET ORAL at 21:21

## 2023-01-19 RX ADMIN — Medication 650 MILLIGRAM(S): at 14:33

## 2023-01-19 RX ADMIN — MORPHINE SULFATE 2 MILLIGRAM(S): 50 CAPSULE, EXTENDED RELEASE ORAL at 04:26

## 2023-01-19 RX ADMIN — Medication 90 MICROGRAM(S): at 06:25

## 2023-01-19 RX ADMIN — AMPICILLIN SODIUM AND SULBACTAM SODIUM 200 GRAM(S): 250; 125 INJECTION, POWDER, FOR SUSPENSION INTRAMUSCULAR; INTRAVENOUS at 04:49

## 2023-01-19 RX ADMIN — OXYCODONE HYDROCHLORIDE 10 MILLIGRAM(S): 5 TABLET ORAL at 08:40

## 2023-01-19 RX ADMIN — GABAPENTIN 200 MILLIGRAM(S): 400 CAPSULE ORAL at 21:21

## 2023-01-19 RX ADMIN — QUETIAPINE FUMARATE 50 MILLIGRAM(S): 200 TABLET, FILM COATED ORAL at 21:22

## 2023-01-19 RX ADMIN — POLYETHYLENE GLYCOL 3350 17 GRAM(S): 17 POWDER, FOR SOLUTION ORAL at 11:54

## 2023-01-19 RX ADMIN — OXYCODONE HYDROCHLORIDE 10 MILLIGRAM(S): 5 TABLET ORAL at 09:40

## 2023-01-19 RX ADMIN — MORPHINE SULFATE 2 MILLIGRAM(S): 50 CAPSULE, EXTENDED RELEASE ORAL at 12:00

## 2023-01-19 RX ADMIN — Medication 650 MILLIGRAM(S): at 15:33

## 2023-01-19 RX ADMIN — FAMOTIDINE 20 MILLIGRAM(S): 10 INJECTION INTRAVENOUS at 11:56

## 2023-01-19 RX ADMIN — MORPHINE SULFATE 2 MILLIGRAM(S): 50 CAPSULE, EXTENDED RELEASE ORAL at 13:00

## 2023-01-19 RX ADMIN — MORPHINE SULFATE 4 MILLIGRAM(S): 50 CAPSULE, EXTENDED RELEASE ORAL at 17:40

## 2023-01-19 RX ADMIN — MORPHINE SULFATE 2 MILLIGRAM(S): 50 CAPSULE, EXTENDED RELEASE ORAL at 04:10

## 2023-01-19 RX ADMIN — CHLORHEXIDINE GLUCONATE 15 MILLILITER(S): 213 SOLUTION TOPICAL at 05:35

## 2023-01-19 RX ADMIN — MORPHINE SULFATE 4 MILLIGRAM(S): 50 CAPSULE, EXTENDED RELEASE ORAL at 16:45

## 2023-01-19 RX ADMIN — CHLORHEXIDINE GLUCONATE 15 MILLILITER(S): 213 SOLUTION TOPICAL at 18:02

## 2023-01-19 RX ADMIN — GABAPENTIN 200 MILLIGRAM(S): 400 CAPSULE ORAL at 14:35

## 2023-01-19 NOTE — PROGRESS NOTE ADULT - SUBJECTIVE AND OBJECTIVE BOX
[facility]  RHYSpascual Ana PEOPLES  Pager [phone]  Available via MS Teams    SUBJECTIVE / OVERNIGHT EVENTS: tolerated procedure well.  States that had alot of pain after procedure. Patient stating that she has been having new headaches with floaters. No dizziness, nausea or vomiting. No fevers or chills appreciated     ADDITIONAL REVIEW OF SYSTEMS:    MEDICATIONS  (STANDING):  ampicillin/sulbactam  IVPB 3 Gram(s) IV Intermittent every 6 hours  chlorhexidine 0.12% Liquid 15 milliLiter(s) Swish and Spit two times a day  famotidine    Tablet 20 milliGRAM(s) Oral daily  gabapentin 200 milliGRAM(s) Oral every 8 hours  influenza   Vaccine 0.5 milliLiter(s) IntraMuscular once  levothyroxine Injectable 90 MICROGram(s) IV Push <User Schedule>  polyethylene glycol 3350 17 Gram(s) Oral daily  QUEtiapine 50 milliGRAM(s) Oral daily  senna 2 Tablet(s) Oral at bedtime  sertraline 50 milliGRAM(s) Oral daily  sodium chloride 0.9%. 1000 milliLiter(s) (100 mL/Hr) IV Continuous <Continuous>    MEDICATIONS  (PRN):  acetaminophen     Tablet .. 650 milliGRAM(s) Oral every 6 hours PRN Temp greater or equal to 38C (100.4F), Mild Pain (1 - 3)  morphine  - Injectable 2 milliGRAM(s) IV Push every 4 hours PRN Moderate Pain (4 - 6)  morphine  - Injectable 4 milliGRAM(s) IV Push every 4 hours PRN Severe Pain (7 - 10)  oxyCODONE    IR 10 milliGRAM(s) Oral every 4 hours PRN Mild Pain (1 - 3)      I&O's Summary    19 Jan 2023 07:01  -  19 Jan 2023 16:37  --------------------------------------------------------  IN: 400 mL / OUT: 0 mL / NET: 400 mL        PHYSICAL EXAM:  Vital Signs Last 24 Hrs  T(C): 36.6 (19 Jan 2023 14:45), Max: 36.8 (19 Jan 2023 09:41)  T(F): 97.8 (19 Jan 2023 14:45), Max: 98.2 (19 Jan 2023 09:41)  HR: 84 (19 Jan 2023 14:45) (67 - 86)  BP: 117/70 (19 Jan 2023 14:45) (113/72 - 139/88)  BP(mean): 64 (18 Jan 2023 22:00) (64 - 104)  RR: 17 (19 Jan 2023 14:45) (12 - 18)  SpO2: 100% (19 Jan 2023 14:45) (94% - 100%)    Parameters below as of 19 Jan 2023 14:45  Patient On (Oxygen Delivery Method): room air      CONSTITUTIONAL: mild distress due to pain   ENMT: Moist oral mucosa, significant swelling more on Left than right. Tender to palpation. Erythema and discoloration noted on skin  NECK: Supple, no palpable masses  RESPIRATORY: Normal respiratory effort; lungs are clear to auscultation bilaterally  CARDIOVASCULAR: Regular rate and rhythm, normal S1 and S2, no murmur/rub/gallop; No lower extremity edema; Peripheral pulses are 2+ bilaterally  ABDOMEN: Nontender to palpation, normoactive bowel sounds, no rebound/guarding; MUSCULOSKELETAL:  Normal gait; no joint swelling or tenderness to palpation  PSYCH: A+O to person, place, and time; affect appropriate  NEUROLOGY: CN 2-12 are intact and symmetric; no gross sensory deficits   SKIN: No rashes; no palpable lesions    LABS:                        10.3   7.52  )-----------( 273      ( 19 Jan 2023 06:30 )             32.2     01-19    138  |  102  |  9   ----------------------------<  153<H>  4.2   |  24  |  0.83    Ca    8.7      19 Jan 2023 06:30  Phos  3.9     01-19  Mg     2.00     01-19      PT/INR - ( 18 Jan 2023 08:05 )   PT: 11.9 sec;   INR: 1.03 ratio                   COVID-19 PCR: NotDetec (17 Jan 2023 07:30)  COVID-19 PCR: NotDetec (03 Jan 2023 02:00)      RADIOLOGY & ADDITIONAL TESTS:  New Results Reviewed Today:   New Imaging Personally Reviewed Today:  New Electrocardiogram Personally Reviewed Today:  Prior or Outpatient Records Reviewed Today:    COMMUNICATION:  Care Discussed with Consultants/Other Providers and Details of Discussion:  Discussions with Patient/Family:  PCP Communication:

## 2023-01-19 NOTE — PROGRESS NOTE ADULT - ASSESSMENT
47y Female with PMH of depression, anxiety, and thyroid cancer s/p left partial thyroidectomy (1996) and central neck dissection (5/22) with recent admission at [facility] for left facial abscess p/w recurrent abscess, failed output PO abx. S/p OR for with ENT for facial abscess.

## 2023-01-19 NOTE — CHART NOTE - NSCHARTNOTEFT_GEN_A_CORE
Primary team called reports patient c/o floaters in the eyes, which is not new, but worsen in compared to the past.  Primary team ordered CT head w/o contrast and CT maxillofacial with IV contrast to further evaluate  ENT will follow the CT result and assess the patient  Please consult Ophthalmology for the worsening floaters

## 2023-01-19 NOTE — DIETITIAN INITIAL EVALUATION ADULT - PERTINENT MEDS FT
MEDICATIONS  (STANDING):  ampicillin/sulbactam  IVPB 3 Gram(s) IV Intermittent every 6 hours  chlorhexidine 0.12% Liquid 15 milliLiter(s) Swish and Spit two times a day  famotidine    Tablet 20 milliGRAM(s) Oral daily  gabapentin 200 milliGRAM(s) Oral every 8 hours  influenza   Vaccine 0.5 milliLiter(s) IntraMuscular once  levothyroxine Injectable 90 MICROGram(s) IV Push <User Schedule>  polyethylene glycol 3350 17 Gram(s) Oral daily  QUEtiapine 50 milliGRAM(s) Oral daily  senna 2 Tablet(s) Oral at bedtime  sertraline 50 milliGRAM(s) Oral daily  sodium chloride 0.9%. 1000 milliLiter(s) (100 mL/Hr) IV Continuous <Continuous>    MEDICATIONS  (PRN):  acetaminophen     Tablet .. 650 milliGRAM(s) Oral every 6 hours PRN Temp greater or equal to 38C (100.4F), Mild Pain (1 - 3)  morphine  - Injectable 2 milliGRAM(s) IV Push every 4 hours PRN Moderate Pain (4 - 6)  morphine  - Injectable 4 milliGRAM(s) IV Push every 4 hours PRN Severe Pain (7 - 10)  oxyCODONE    IR 10 milliGRAM(s) Oral every 4 hours PRN Mild Pain (1 - 3)

## 2023-01-19 NOTE — CONSULT NOTE ADULT - ASSESSMENT
Assessment and Recommendations:  47y female with a past medical history/ocular history of depression, anxiety, and thyroid cancer s/p left partial thyroidectomy (1996) and central neck dissection (5/22) and new recurrent facial abscesses with drainage consulted for new floater in the left eye, found to have benign retinal exam, floater in the right eye likely debris vs PVD. Patient with VA 20/25 OD, 20/20 OS, IOP within normal limits, EOMI, PERRLA with no rAPD, CVF w/ ?inferior field restriction (see exam above), color plates full. Anterior exam benign. Dilated fundus exam with blond fundus, 3 small areas of RPE atrophy 2 nasal in the right eye, 1 inferior in the left eye.    1) Floater, right eye  - patient with floater in the right eye for about 3 days  - patient denies flashes, burst of floaters, transient vision loss, trauma, black curtain falling over the vision  - dilated exam with 28D indirect ophthalmoscopic exam show no tears, holes, detachment 360  - likely floater as part of vitreous syneresis vs posterior vitreous detachment, both benign conditions  - retinal detachment precautions given to patient; please notify ophthalmology if patient experiences significant burst of floaters, flashing lights, curtain over vision  - will follow up with patient outpatient once discharged    2) Family history of glaucoma  - patient has strong family history of glaucoma  - IOPs today are within normal limits, EOMI  - CVF with possible mild inferior restriction  - vasculature of the eye and retina appear healthy and flat with appropriate perfusion  - CT scans show no abnormalities of orbital apex, no masses, color vision full, nerves with normal size cup no atrophy no pallor  - will follow up with visual field testing and OCT N testing outpatient    Outpatient Follow-up: Patient should follow-up with his/her ophthalmologist or with [facility] Department of Ophthalmology within 1 week of after discharge at:    [address]  [place] [address]  [phone]    Augie Stein MD, PGY2  Also available on [employer] Teams

## 2023-01-19 NOTE — DIETITIAN INITIAL EVALUATION ADULT - CALCULATED TO (G/KG)
Left message on answering machine for patient to call back.  Needs ultrasound 1st.    Shanta Potts RN     68.52

## 2023-01-19 NOTE — PROGRESS NOTE ADULT - PROBLEM SELECTOR PLAN 1
ENT, dental and OMFS consulted, /p I&D of abscess and tooth extraction 1/12; CT max facial shows worsening abscess  - previous abscess culture grew strep anginosus and Prevotella denticola  - c/w Unasyn, currently on day 9 will likely complete a total 14 day course once abscess drained   - s/p OR with ENT on 1/18, procedure note patient with hematoma evacuation   - Abscess culture growing, Abscess culture showing Strep mitis oralis  - BCX NGTD  - c/w Peridex swish and spit BID  - pain control with morphine 2mg and 4mg q4hrs PRN,   - c/w oxy 10mg PRN

## 2023-01-19 NOTE — DIETITIAN INITIAL EVALUATION ADULT - ORAL INTAKE PTA/DIET HISTORY
Pt lives at home with children. They cook together at home. No difficulty obtaining groceries. No specific diet followed PTA. Multivitamin taken PTA.

## 2023-01-19 NOTE — CHART NOTE - NSCHARTNOTEFT_GEN_A_CORE
Medicine PA Note    Spoke to Ophthalmology for a consult for patient having new onset of HA and bilateral eye floaters. Ophthalmology requested a visual acuity. Snellen shows (with corrected lenses) right eye 20/25 and left eye is 20/20. Pt denies any blurred vision, visual changes or sudden visual loss. Only c/o "floaters". Findings discussed with Ophthalmology.    Dick Correa PA-C  x [phone]

## 2023-01-19 NOTE — CONSULT NOTE ADULT - SUBJECTIVE AND OBJECTIVE BOX
[facility] DEPARTMENT OF OPHTHALMOLOGY - INITIAL ADULT CONSULT  -----------------------------------------------------------------------------------------------------------------  Augie Stein MD, PGY2  Available on [employer] Teams  -----------------------------------------------------------------------------------------------------------------    HPI:  47y Female with PMH of depression, anxiety, and thyroid cancer s/p left partial thyroidectomy (1996) and central neck dissection (5/22) who presented with recurrent left sided facial swelling and severe pain. Pt was recently admitted at [facility](1/2-1/7) for left facial abscess x 20 days, underwent drainage, placed  on IV zosyn continued on on Augmentin. Notes despite compliance with Augmentin, pain increased and slight increased in swelling since d/c on Saturday (1/7).. Reports being seen by oral surgeon who deemed source was not odontogenic.  CT maxillofacial noteworthy for left transpatial facial abscess decreased relative to previous study, but component superficial to left masseter muscle increased in size. Pt with trismus but reports ability to eat and drink on right side. Seen by ENT but pt refused bedside drainage as previous  time was too painful (11 Jan 2023 05:59)    Patient states that a few days ago, she began seeing a floater in the right eye. States that it happened prior to her most recent surgery. Single floater. No flashes, no curtain, no changes in vision or blurriness, no eye pain, no discharge, no diplopia.     Past Medical History: depression, anxiety, thyroid cancer, multiple abscesses with ENT surgery with drainage  Past Ocular History: denies other than myopia  Drops: none  Allergies: NKDA  Family History: +glaucoma in several family members  Surgical History: as above  Outpatient Ophthalmologist: unsure      Review of Systems:  Constitutional: No fever, chills  Eyes: +floater OD No blurry vision, flashes, FBS, erythema, discharge, double vision, OU  Neuro: No tremors  Cardiovascular: No chest pain, palpitations  Respiratory: No SOB, no cough  GI: No nausea, vomiting, abdominal pain    Vital Signs: T(C): 36.8 (01-19-23 @ 18:16)  T(F): 98.2 (01-19-23 @ 18:16), Max: 98.2 (01-19-23 @ 09:41)  HR: 77 (01-19-23 @ 18:16) (67 - 86)  BP: 120/80 (01-19-23 @ 18:16) (116/69 - 139/88)  RR:  (12 - 17)  SpO2:  (94% - 100%)  Wt(kg): --  AAOx3    Ophthalmology Exam:  Visual acuity (cc): 20/25 OD. 20/20 OS  Pupils: PERRL OU, no APD  Intraocular Pressure:  Ttono 10 OD, 17 OS  Extraocular movements (EOMs): Full OU, no pain, no diplopia.  Confrontational Visual Field (CVF): ?mild inferior restriction OD - vision in inferior fields comes and goes, able to see overall, however on redcap test, appears to have mild restriction. Full OS.   Color Plates: 12/12 OU.    Pen Light Exam (PLE)  External: Normal OU.  Lids/Lashes/Lacrimal Ducts: Flat OU.  Sclera/Conjunctiva: White and quiet OU.  Cornea: Clear OU.  Anterior Chamber: Deep and formed OU.    Iris: Flat OU.  Lens: Clear OU.    Fundus Exam: dilated with 1% tropicamide and 2.5% phenylephrine  Approval obtained from primary team for dilation  Patient aware that pupils can remained dilated for at least 4-6 hours.  Exam performed with 20 D lens    Vitreous: wnl OU  Disc, cup/disc: sharp and pink, 0.3 OU  Macula: wnl OU  Vessels: wnl OU  Periphery: blond fundus OU; RPE circular atrophy 2 locations in far periphery nasally OD, 1 location inferiorly OS    Labs/Imaging:  < from: CT Maxillofacial w/ IV Cont (01.15.23 @ 16:52) >  IMPRESSION:    Interval extraction of multiple maxillaryand mandibular teeth, including   a carious left mandibular molar. Interval placement of a catheter in the   left cheek, draining into the oral cavity. Slightly inferior to this   drainage catheter, in the region of a previously seen abscess, there is   soft tissue measuring 2.4 x 2.3 cm which is overall unchanged in size but   increased in density, suggesting phlegmon versus residual abscess (2:57).    Decreased size of abscess along the lateral aspect of the left mandibular   ramus and posterior body (2:64).    Decreased size of abscess in the left temporalis muscle, now measuring   3.8 x 1.1 x 2.8 cm.    < end of copied text >

## 2023-01-20 ENCOUNTER — TRANSCRIPTION ENCOUNTER (OUTPATIENT)
Age: 48
End: 2023-01-20

## 2023-01-20 LAB
ANION GAP SERPL CALC-SCNC: 8 MMOL/L — SIGNIFICANT CHANGE UP (ref 7–14)
BASOPHILS # BLD AUTO: 0.09 K/UL — SIGNIFICANT CHANGE UP (ref 0–0.2)
BASOPHILS NFR BLD AUTO: 1.4 % — SIGNIFICANT CHANGE UP (ref 0–2)
BUN SERPL-MCNC: 9 MG/DL — SIGNIFICANT CHANGE UP (ref 7–23)
CALCIUM SERPL-MCNC: 8.7 MG/DL — SIGNIFICANT CHANGE UP (ref 8.4–10.5)
CHLORIDE SERPL-SCNC: 102 MMOL/L — SIGNIFICANT CHANGE UP (ref 98–107)
CO2 SERPL-SCNC: 30 MMOL/L — SIGNIFICANT CHANGE UP (ref 22–31)
CREAT SERPL-MCNC: 0.9 MG/DL — SIGNIFICANT CHANGE UP (ref 0.5–1.3)
EGFR: 79 ML/MIN/1.73M2 — SIGNIFICANT CHANGE UP
EOSINOPHIL # BLD AUTO: 0.16 K/UL — SIGNIFICANT CHANGE UP (ref 0–0.5)
EOSINOPHIL NFR BLD AUTO: 2.4 % — SIGNIFICANT CHANGE UP (ref 0–6)
GLUCOSE SERPL-MCNC: 95 MG/DL — SIGNIFICANT CHANGE UP (ref 70–99)
HCT VFR BLD CALC: 31.2 % — LOW (ref 34.5–45)
HGB BLD-MCNC: 9.9 G/DL — LOW (ref 11.5–15.5)
IANC: 4.09 K/UL — SIGNIFICANT CHANGE UP (ref 1.8–7.4)
IMM GRANULOCYTES NFR BLD AUTO: 0.3 % — SIGNIFICANT CHANGE UP (ref 0–0.9)
LYMPHOCYTES # BLD AUTO: 1.81 K/UL — SIGNIFICANT CHANGE UP (ref 1–3.3)
LYMPHOCYTES # BLD AUTO: 27.5 % — SIGNIFICANT CHANGE UP (ref 13–44)
MAGNESIUM SERPL-MCNC: 2 MG/DL — SIGNIFICANT CHANGE UP (ref 1.6–2.6)
MCHC RBC-ENTMCNC: 31.7 GM/DL — LOW (ref 32–36)
MCHC RBC-ENTMCNC: 35.6 PG — HIGH (ref 27–34)
MCV RBC AUTO: 112.2 FL — HIGH (ref 80–100)
MONOCYTES # BLD AUTO: 0.4 K/UL — SIGNIFICANT CHANGE UP (ref 0–0.9)
MONOCYTES NFR BLD AUTO: 6.1 % — SIGNIFICANT CHANGE UP (ref 2–14)
NEUTROPHILS # BLD AUTO: 4.09 K/UL — SIGNIFICANT CHANGE UP (ref 1.8–7.4)
NEUTROPHILS NFR BLD AUTO: 62.3 % — SIGNIFICANT CHANGE UP (ref 43–77)
NRBC # BLD: 0 /100 WBCS — SIGNIFICANT CHANGE UP (ref 0–0)
NRBC # FLD: 0 K/UL — SIGNIFICANT CHANGE UP (ref 0–0)
PLATELET # BLD AUTO: 287 K/UL — SIGNIFICANT CHANGE UP (ref 150–400)
POTASSIUM SERPL-MCNC: 4.1 MMOL/L — SIGNIFICANT CHANGE UP (ref 3.5–5.3)
POTASSIUM SERPL-SCNC: 4.1 MMOL/L — SIGNIFICANT CHANGE UP (ref 3.5–5.3)
RBC # BLD: 2.78 M/UL — LOW (ref 3.8–5.2)
RBC # FLD: 12.6 % — SIGNIFICANT CHANGE UP (ref 10.3–14.5)
SODIUM SERPL-SCNC: 140 MMOL/L — SIGNIFICANT CHANGE UP (ref 135–145)
T4 FREE SERPL-MCNC: 0.7 NG/DL — LOW (ref 0.9–1.8)
WBC # BLD: 6.57 K/UL — SIGNIFICANT CHANGE UP (ref 3.8–10.5)
WBC # FLD AUTO: 6.57 K/UL — SIGNIFICANT CHANGE UP (ref 3.8–10.5)

## 2023-01-20 PROCEDURE — 99232 SBSQ HOSP IP/OBS MODERATE 35: CPT

## 2023-01-20 PROCEDURE — 99233 SBSQ HOSP IP/OBS HIGH 50: CPT

## 2023-01-20 PROCEDURE — 70450 CT HEAD/BRAIN W/O DYE: CPT | Mod: 26

## 2023-01-20 PROCEDURE — 70487 CT MAXILLOFACIAL W/DYE: CPT | Mod: 26

## 2023-01-20 RX ORDER — MORPHINE SULFATE 50 MG/1
2 CAPSULE, EXTENDED RELEASE ORAL EVERY 4 HOURS
Refills: 0 | Status: DISCONTINUED | OUTPATIENT
Start: 2023-01-20 | End: 2023-01-21

## 2023-01-20 RX ORDER — KETOROLAC TROMETHAMINE 30 MG/ML
15 SYRINGE (ML) INJECTION EVERY 6 HOURS
Refills: 0 | Status: DISCONTINUED | OUTPATIENT
Start: 2023-01-20 | End: 2023-01-20

## 2023-01-20 RX ORDER — ENOXAPARIN SODIUM 100 MG/ML
40 INJECTION SUBCUTANEOUS EVERY 24 HOURS
Refills: 0 | Status: DISCONTINUED | OUTPATIENT
Start: 2023-01-20 | End: 2023-01-27

## 2023-01-20 RX ORDER — LEVOTHYROXINE SODIUM 125 MCG
1 TABLET ORAL
Qty: 30 | Refills: 0
Start: 2023-01-20 | End: 2023-02-18

## 2023-01-20 RX ADMIN — GABAPENTIN 200 MILLIGRAM(S): 400 CAPSULE ORAL at 05:16

## 2023-01-20 RX ADMIN — OXYCODONE HYDROCHLORIDE 10 MILLIGRAM(S): 5 TABLET ORAL at 19:34

## 2023-01-20 RX ADMIN — SERTRALINE 50 MILLIGRAM(S): 25 TABLET, FILM COATED ORAL at 22:39

## 2023-01-20 RX ADMIN — MORPHINE SULFATE 2 MILLIGRAM(S): 50 CAPSULE, EXTENDED RELEASE ORAL at 05:50

## 2023-01-20 RX ADMIN — GABAPENTIN 200 MILLIGRAM(S): 400 CAPSULE ORAL at 22:39

## 2023-01-20 RX ADMIN — MORPHINE SULFATE 2 MILLIGRAM(S): 50 CAPSULE, EXTENDED RELEASE ORAL at 05:26

## 2023-01-20 RX ADMIN — FAMOTIDINE 20 MILLIGRAM(S): 10 INJECTION INTRAVENOUS at 12:19

## 2023-01-20 RX ADMIN — MORPHINE SULFATE 2 MILLIGRAM(S): 50 CAPSULE, EXTENDED RELEASE ORAL at 22:39

## 2023-01-20 RX ADMIN — AMPICILLIN SODIUM AND SULBACTAM SODIUM 200 GRAM(S): 250; 125 INJECTION, POWDER, FOR SUSPENSION INTRAMUSCULAR; INTRAVENOUS at 00:10

## 2023-01-20 RX ADMIN — QUETIAPINE FUMARATE 50 MILLIGRAM(S): 200 TABLET, FILM COATED ORAL at 22:39

## 2023-01-20 RX ADMIN — MORPHINE SULFATE 2 MILLIGRAM(S): 50 CAPSULE, EXTENDED RELEASE ORAL at 23:40

## 2023-01-20 RX ADMIN — MORPHINE SULFATE 4 MILLIGRAM(S): 50 CAPSULE, EXTENDED RELEASE ORAL at 17:18

## 2023-01-20 RX ADMIN — CHLORHEXIDINE GLUCONATE 15 MILLILITER(S): 213 SOLUTION TOPICAL at 18:13

## 2023-01-20 RX ADMIN — CHLORHEXIDINE GLUCONATE 15 MILLILITER(S): 213 SOLUTION TOPICAL at 05:16

## 2023-01-20 RX ADMIN — AMPICILLIN SODIUM AND SULBACTAM SODIUM 200 GRAM(S): 250; 125 INJECTION, POWDER, FOR SUSPENSION INTRAMUSCULAR; INTRAVENOUS at 12:19

## 2023-01-20 RX ADMIN — AMPICILLIN SODIUM AND SULBACTAM SODIUM 200 GRAM(S): 250; 125 INJECTION, POWDER, FOR SUSPENSION INTRAMUSCULAR; INTRAVENOUS at 06:46

## 2023-01-20 RX ADMIN — GABAPENTIN 200 MILLIGRAM(S): 400 CAPSULE ORAL at 15:12

## 2023-01-20 RX ADMIN — MORPHINE SULFATE 4 MILLIGRAM(S): 50 CAPSULE, EXTENDED RELEASE ORAL at 16:18

## 2023-01-20 RX ADMIN — ENOXAPARIN SODIUM 40 MILLIGRAM(S): 100 INJECTION SUBCUTANEOUS at 12:27

## 2023-01-20 RX ADMIN — Medication 90 MICROGRAM(S): at 06:46

## 2023-01-20 RX ADMIN — AMPICILLIN SODIUM AND SULBACTAM SODIUM 200 GRAM(S): 250; 125 INJECTION, POWDER, FOR SUSPENSION INTRAMUSCULAR; INTRAVENOUS at 18:13

## 2023-01-20 RX ADMIN — Medication 650 MILLIGRAM(S): at 10:50

## 2023-01-20 RX ADMIN — Medication 650 MILLIGRAM(S): at 09:50

## 2023-01-20 RX ADMIN — OXYCODONE HYDROCHLORIDE 10 MILLIGRAM(S): 5 TABLET ORAL at 18:34

## 2023-01-20 RX ADMIN — SENNA PLUS 2 TABLET(S): 8.6 TABLET ORAL at 22:42

## 2023-01-20 RX ADMIN — MORPHINE SULFATE 4 MILLIGRAM(S): 50 CAPSULE, EXTENDED RELEASE ORAL at 10:50

## 2023-01-20 RX ADMIN — MORPHINE SULFATE 4 MILLIGRAM(S): 50 CAPSULE, EXTENDED RELEASE ORAL at 09:50

## 2023-01-20 NOTE — DISCHARGE NOTE PROVIDER - NSDCMRMEDTOKEN_GEN_ALL_CORE_FT
acetaminophen 325 mg oral tablet: 3 tab(s) orally every 6 hours  gabapentin 100 mg oral tablet: 2 tab(s) orally every 8 hours  levothyroxine 100 mcg (0.1 mg) oral tablet: 1 tab(s) orally once a day  QUEtiapine 50 mg oral tablet: 1 tab(s) orally once a day  sertraline 50 mg oral tablet: 1 tab(s) orally once a day   acetaminophen 325 mg oral tablet: 3 tab(s) orally every 6 hours  amoxicillin-clavulanate 875 mg-125 mg oral tablet: 1 tab(s) orally every 12 hours  famotidine 20 mg oral tablet: 1 tab(s) orally once a day  gabapentin 100 mg oral tablet: 2 tab(s) orally every 8 hours  oxyCODONE 15 mg oral tablet: 1 tab(s) orally every 6 hours, As needed, Severe Pain (7 - 10)  polyethylene glycol 3350 oral powder for reconstitution: 17 gram(s) orally once a day  QUEtiapine 50 mg oral tablet: 1 tab(s) orally once a day  senna leaf extract oral tablet: 2 tab(s) orally once a day (at bedtime)  sertraline 50 mg oral tablet: 1 tab(s) orally once a day   acetaminophen 325 mg oral tablet: 3 tab(s) orally every 6 hours  amoxicillin-clavulanate 875 mg-125 mg oral tablet: 1 tab(s) orally every 12 hours  famotidine 20 mg oral tablet: 1 tab(s) orally once a day  gabapentin 100 mg oral tablet: 2 tab(s) orally every 8 hours  levothyroxine 125 mcg (0.125 mg) oral capsule: 1 cap(s) orally once a day   oxyCODONE 15 mg oral tablet: 1 tab(s) orally every 6 hours, As needed, Severe Pain (7 - 10) MDD:4  polyethylene glycol 3350 oral powder for reconstitution: 17 gram(s) orally once a day  QUEtiapine 50 mg oral tablet: 1 tab(s) orally once a day  senna leaf extract oral tablet: 2 tab(s) orally once a day (at bedtime)  sertraline 50 mg oral tablet: 1 tab(s) orally once a day

## 2023-01-20 NOTE — PROGRESS NOTE ADULT - SUBJECTIVE AND OBJECTIVE BOX
[facility] DEPARTMENT OF OPHTHALMOLOGY  ------------------------------------------------------------------------------  Rickey Childs MD PGY-3  Pager: [phone], also available on teams  ------------------------------------------------------------------------------    Interval History: No acute events overnight. Stable floaters OU, no flashes or curtains     MEDICATIONS  (STANDING):  ampicillin/sulbactam  IVPB 3 Gram(s) IV Intermittent every 6 hours  chlorhexidine 0.12% Liquid 15 milliLiter(s) Swish and Spit two times a day  enoxaparin Injectable 40 milliGRAM(s) SubCutaneous every 24 hours  famotidine    Tablet 20 milliGRAM(s) Oral daily  gabapentin 200 milliGRAM(s) Oral every 8 hours  influenza   Vaccine 0.5 milliLiter(s) IntraMuscular once  levothyroxine Injectable 90 MICROGram(s) IV Push <User Schedule>  polyethylene glycol 3350 17 Gram(s) Oral daily  QUEtiapine 50 milliGRAM(s) Oral daily  senna 2 Tablet(s) Oral at bedtime  sertraline 50 milliGRAM(s) Oral daily  sodium chloride 0.9%. 1000 milliLiter(s) (100 mL/Hr) IV Continuous <Continuous>    MEDICATIONS  (PRN):  acetaminophen     Tablet .. 650 milliGRAM(s) Oral every 6 hours PRN Temp greater or equal to 38C (100.4F), Mild Pain (1 - 3)  morphine  - Injectable 2 milliGRAM(s) IV Push every 4 hours PRN Severe Pain (7 - 10)  oxyCODONE    IR 10 milliGRAM(s) Oral every 4 hours PRN Mild Pain (1 - 3)      VITALS: T(C): 36.7 (01-20-23 @ 18:00)  T(F): 98 (01-20-23 @ 18:00), Max: 98.2 (01-19-23 @ 18:16)  HR: 90 (01-20-23 @ 18:00) (69 - 90)  BP: 135/80 (01-20-23 @ 18:00) (110/67 - 135/80)  RR:  (17 - 18)  SpO2:  (95% - 100%)  Wt(kg): --  General: AAO x 3, appropriate mood and affect    Ophthalmology Exam:  Visual acuity (cc): 20/20 OD. 20/25 OS  Pupils: PERRL OU, no APD  Extraocular movements (EOMs): Full OU, no pain, no diplopia.    Pen Light Exam (PLE)  External: Normal OU.  Lids/Lashes/Lacrimal Ducts: Flat OU.  Sclera/Conjunctiva: White and quiet OU.  Cornea: Clear OU.  Anterior Chamber: Deep and formed OU.    Iris: Flat OU.  Lens: Clear OU.    Fundus Exam: dilated with 1% tropicamide and 2.5% phenylephrine  Approval obtained from primary team for dilation  Patient aware that pupils can remained dilated for at least 4-6 hours.  Exam performed with 20 D lens    Vitreous: Vitreous syneresis OU  Disc, cup/disc: sharp and pink, 0.3 OU  Macula: wnl OU  Vessels: wnl OU  Periphery: blond fundus OU; RPE circular atrophy 2 locations in far periphery nasally OD, 1 location inferiorly OS    Labs/Imaging:  < from: CT Maxillofacial w/ IV Cont (01.15.23 @ 16:52) >  IMPRESSION:    Interval extraction of multiple maxillaryand mandibular teeth, including   a carious left mandibular molar. Interval placement of a catheter in the   left cheek, draining into the oral cavity. Slightly inferior to this   drainage catheter, in the region of a previously seen abscess, there is   soft tissue measuring 2.4 x 2.3 cm which is overall unchanged in size but   increased in density, suggesting phlegmon versus residual abscess (2:57).    Decreased size of abscess along the lateral aspect of the left mandibular   ramus and posterior body (2:64).    Decreased size of abscess in the left temporalis muscle, now measuring   3.8 x 1.1 x 2.8 cm.    < end of copied text >

## 2023-01-20 NOTE — PROGRESS NOTE ADULT - ASSESSMENT
47F with left  space tracking to temporal region s/p I&D 1/2 p/w recurrent L abscess 1/10 s/p I&D and dental extraction 1/12.   RTOR on 1/18 for continued pain and swelling to Left cheek - intraopt no pus expressed.

## 2023-01-20 NOTE — PROGRESS NOTE ADULT - SUBJECTIVE AND OBJECTIVE BOX
ENT ISSUE/POD: ABscess     HPI: Pt seen and examined at bedside this AM. Pt admitted feeling "okay". Pt reported pain still a continued issue. Pt reported left temporal swelling significantly decreased. Pt endorsed left cheek swelling and pain are still significant. Pt in good spirts.          PAST MEDICAL & SURGICAL HISTORY:  Thyroid cancer  1996 with ? left partial thyroidectomy with NO post op chemotherapy or RT and in 2011 with total thyroidectomy with NO post op chemotherapy or RT      Neck mass  2022      H/O iron deficiency anemia  followed by hematology. Has received iron infusions. Last infusion in January 2021      Anxiety and depression      History of hoarseness  2022      Lumbar spinal stenosis  and bulging disc      Pain  cystic lesion anterior to the sacrum --- followed by neurologist      H/O thyroidectomy  1996 partial thyroidectomy and in 2011 total thyroidectomy      History of sleeve gastrectomy  has lost 280 pounds      Excessive vaginal bleeding  hyterectomy Jan 2021 -- received 7 units PRBC        Allergies    No Known Allergies    Intolerances      MEDICATIONS  (STANDING):  ampicillin/sulbactam  IVPB 3 Gram(s) IV Intermittent every 6 hours  chlorhexidine 0.12% Liquid 15 milliLiter(s) Swish and Spit two times a day  famotidine    Tablet 20 milliGRAM(s) Oral daily  gabapentin 200 milliGRAM(s) Oral every 8 hours  influenza   Vaccine 0.5 milliLiter(s) IntraMuscular once  levothyroxine Injectable 90 MICROGram(s) IV Push <User Schedule>  polyethylene glycol 3350 17 Gram(s) Oral daily  QUEtiapine 50 milliGRAM(s) Oral daily  senna 2 Tablet(s) Oral at bedtime  sertraline 50 milliGRAM(s) Oral daily  sodium chloride 0.9%. 1000 milliLiter(s) (100 mL/Hr) IV Continuous <Continuous>    MEDICATIONS  (PRN):  acetaminophen     Tablet .. 650 milliGRAM(s) Oral every 6 hours PRN Temp greater or equal to 38C (100.4F), Mild Pain (1 - 3)  morphine  - Injectable 2 milliGRAM(s) IV Push every 4 hours PRN Moderate Pain (4 - 6)  morphine  - Injectable 4 milliGRAM(s) IV Push every 4 hours PRN Severe Pain (7 - 10)  oxyCODONE    IR 10 milliGRAM(s) Oral every 4 hours PRN Mild Pain (1 - 3)         ROS:   ENT: all negative except as noted in HPI   Pulm: denies SOB, cough, hemoptysis  Neuro: denies numbness/tingling, loss of sensation  Endo: denies heat/cold intolerance, excessive sweating      Vital Signs Last 24 Hrs  T(C): 36.7 (20 Jan 2023 05:21), Max: 36.8 (19 Jan 2023 09:41)  T(F): 98 (20 Jan 2023 05:21), Max: 98.2 (19 Jan 2023 09:41)  HR: 69 (20 Jan 2023 05:21) (69 - 86)  BP: 110/67 (20 Jan 2023 05:21) (110/67 - 125/79)  BP(mean): --  RR: 18 (20 Jan 2023 05:21) (17 - 18)  SpO2: 96% (20 Jan 2023 05:21) (96% - 100%)    Parameters below as of 20 Jan 2023 05:21  Patient On (Oxygen Delivery Method): room air                              9.9    6.57  )-----------( 287      ( 20 Jan 2023 05:29 )             31.2    01-20    140  |  102  |  9   ----------------------------<  95  4.1   |  30  |  0.90    Ca    8.7      20 Jan 2023 05:29  Phos  3.9     01-19  Mg     2.00     01-20         PHYSICAL EXAM:  Gen: NAD, sitting in bed   Skin: No rashes, bruises, or lesions  Head: Normocephalic, Atraumatic  Face: + ecchymosis noted to external left cheek + swelling noted + tenderness to palpation   Eyes: no scleral injection  Ears: Right -  external ear without abnormalities   Left external ear without abnormalities   Nose: Nares bilaterally patent, no discharge  Mouth: No Stridor / Drooling / Trismus.  Mucosa moist, tongue/uvula midline,   Lymphatic: No lymphadenopathy  Resp: breathing easily, no stridor

## 2023-01-20 NOTE — DISCHARGE NOTE PROVIDER - NSDCFUSCHEDAPPT_GEN_ALL_CORE_FT
Elizabeth Jaimes  [facility]  [address]  Scheduled Appointment: 01/26/2023

## 2023-01-20 NOTE — DISCHARGE NOTE PROVIDER - ATTENDING DISCHARGE PHYSICAL EXAMINATION:
CONSTITUTIONAL: NAD, well-developed, well-groomed  EYES: PERRLA; conjunctiva and sclera clear  ENMT: left facial swelling localized around parotid area with dense indurated lesion, no erythema or warmth, no overt drainage, no discoloration  NECK: Supple, no palpable masses; no thyromegaly  RESPIRATORY: Normal respiratory effort; lungs are clear to auscultation bilaterally  CARDIOVASCULAR: Regular rate and rhythm, normal S1 and S2, no murmur/rub/gallop; No lower extremity edema; Peripheral pulses are 2+ bilaterally  ABDOMEN: Nontender to palpation, normoactive bowel sounds, no rebound/guarding; No hepatosplenomegaly  MUSCULOSKELETAL:  Normal gait; no clubbing or cyanosis of digits; no joint swelling or tenderness to palpation  PSYCH: A+O to person, place, and time; affect appropriate  NEUROLOGY: CN 2-12 are intact and symmetric; no gross sensory deficits   SKIN: No rashes; no palpable lesions     CONSTITUTIONAL: NAD, well-developed, well-groomed  EYES: PERRLA; conjunctiva and sclera clear  ENMT: left facial swelling localized improving around parotid area with dense indurated lesion, no erythema or warmth, no overt drainage, no discoloration  NECK: Supple, no palpable masses; no thyromegaly  RESPIRATORY: Normal respiratory effort; lungs are clear to auscultation bilaterally  CARDIOVASCULAR: Regular rate and rhythm, normal S1 and S2, no murmur/rub/gallop; No lower extremity edema; Peripheral pulses are 2+ bilaterally  ABDOMEN: Nontender to palpation, normoactive bowel sounds, no rebound/guarding; No hepatosplenomegaly  MUSCULOSKELETAL:  Normal gait; no clubbing or cyanosis of digits; no joint swelling or tenderness to palpation  PSYCH: A+O to person, place, and time; affect appropriate  NEUROLOGY: CN 2-12 are intact and symmetric; no gross sensory deficits   SKIN: No rashes; no palpable lesions

## 2023-01-20 NOTE — DISCHARGE NOTE PROVIDER - CARE PROVIDER_API CALL
Favian Camacho)  Otolaryngology  [address] [place] [address]  Phone: [phone]  Fax: [phone]  Follow Up Time:     Dr. Elvia Laws,   Medical Clinic   [address] [place] [address]  Phone: [phone]  Fax: (   )    -  Follow Up Time:     [facility],   [address]  [place] [address]  Phone: [phone]  Fax: (   )    -  Follow Up Time:     Tracey Hightower)  Infectious Disease; Internal Medicine  [address] [place] [address]  Phone: [phone]  Fax: [phone]  Follow Up Time:

## 2023-01-20 NOTE — DISCHARGE NOTE PROVIDER - NSDCFUADDAPPT_GEN_ALL_CORE_FT
Follow up with [facility]  [address] [place] [address]. [address]  [place] [address]  [phone]    Follow up with Dr. Favian Camacho of ENT 1 week after discharge    Follow up with your Primary Care Doctor.

## 2023-01-20 NOTE — DISCHARGE NOTE PROVIDER - NSDCCPCAREPLAN_GEN_ALL_CORE_FT
PRINCIPAL DISCHARGE DIAGNOSIS  Diagnosis: Facial abscess  Assessment and Plan of Treatment: You came in for recurrent facial abscess. You had 4 of your teeth removed by the dental team. Your abscess culture grew a common oral bug called streptocoous. You had surgery with ENT and had a hematoma evacuated. Your pain gradually imrpoved and you were discharged with ________. You will be discharged with augmentin PO to complete a 14 day course until _____.   Please be sure to followup with ENT as outpatient to follow up with your abscess and ensure that it does not get larger. If you have fevers or chills at home, please contact your PCP or go to your nearest ED for further evalaution.      SECONDARY DISCHARGE DIAGNOSES  Diagnosis: Hypothyroid  Assessment and Plan of Treatment: You were found to have low thyroid levels and were seen by the endocrine team here in the hospital. You were given IV thyroid hormones. You will be dischared with 125mcg of thyroid hormone to take to maintain your thyroid fuction. Please be sure follow your endocrinologist as outpatient.     PRINCIPAL DISCHARGE DIAGNOSIS  Diagnosis: Facial abscess  Assessment and Plan of Treatment: You came in for recurrent facial abscess. You had teeth removed by the dental team. You had surgery with ENT. You will be discharged with augmentin PO to complete a 14 day course as per Infectious Disease. Please be sure to followup with ENT and ID as outpatient. If you have fevers or chills at home, please contact your PCP or go to your nearest ED for further evalaution.      SECONDARY DISCHARGE DIAGNOSES  Diagnosis: Hypothyroid  Assessment and Plan of Treatment: You were found to have low thyroid levels and were seen by the endocrine team here in the hospital. You were given IV thyroid hormones. You will be dischared with 125mcg of thyroid hormone to take to maintain your thyroid fuction. Please be sure follow your endocrinologist as outpatient.     PRINCIPAL DISCHARGE DIAGNOSIS  Diagnosis: Facial abscess  Assessment and Plan of Treatment: You came in for recurrent facial abscess. You had teeth removed by the dental team. You had surgery with ENT. You will be discharged with augmentin PO to complete a 14 day course as per Infectious Disease. Please be sure to followup with ENT and ID as outpatient. If you have fevers or chills at home, please contact your PCP or go to your nearest ED for further evalaution.   Please follow up with ENT Dr. Camacho in a week after discharge, please call [phone] for an appointment time      SECONDARY DISCHARGE DIAGNOSES  Diagnosis: Hypothyroid  Assessment and Plan of Treatment: You were found to have low thyroid levels and were seen by the endocrine team here in the hospital. You were given IV thyroid hormones. You will be dischared with 125mcg of thyroid hormone to take to maintain your thyroid fuction. Please be sure follow your endocrinologist as outpatient.

## 2023-01-20 NOTE — DISCHARGE NOTE PROVIDER - NSDCADMDATE_GEN_ALL_CORE_FT
For information on Fall & Injury Prevention, visit [email or web address]
11-Jan-2023 03:26

## 2023-01-20 NOTE — DISCHARGE NOTE PROVIDER - HOSPITAL COURSE
47y Female with PMH of depression, anxiety, and thyroid cancer s/p left partial thyroidectomy (1996) and central neck dissection (5/22) with recent admission at [facility] for left facial abscess p/w recurrent abscess, failed output PO abx. S/p OR for with ENT for facial abscess.     on discharge, recommend increasing to weight based LT4 125mcg daily, every morning on an empty stomach at least 30mins prior to food/other meds, 4 hours apart from Ca/Fe/MVI   - repeat free T4 in 1 week if still admitted at that time (1/20/23) to ensure FT4 levels are trending up, otherwise repeat TFTs in 4-6 weeks as outpatient    47y Female with PMH of depression, anxiety, and thyroid cancer s/p left partial thyroidectomy (1996) and central neck dissection (5/22) with recent admission at [facility] for left facial abscess p/w recurrent abscess, failed output PO abx. S/p OR for with ENT for facial abscess. CT head with no extracranial extension.     Facial abscess. ENT, dental and OMFS consulted, s/p I&D of abscess and tooth extraction 1/12; CT max facial shows worsening abscess. Previous abscess culture grew strep anginosus and Prevotella denticola. S/p OR with ENT on 1/18, procedure note patient with hematoma evacuation.Abscess culture showing Strep mitis oralis. BCX NGTD. C/w Peridex swish and spit BID. Transitioned to augmentin on discharge 875 q12hrs x 2 weeks with ENT and ID follow up. C/w pain control - PRN oxycodone, avoiding IV opioids as able, continue to monitor closely. Patient reporting continued facial pain, ENT informed, evaluated, per ENT stable for discharge with close outpatient follow up.    Migraine. Patient with new headaches with floaters, Now resolved. Seen by Ophtho, no overt concerns at this time, if experiences significant burst of floaters, flashing lights, curtain over vision, pt to seek urgent medical care. CTH and CT maxillofacial reviewed, patient with no extracranial tracking, abscess location decreased compared to previous imaging. ENT following. Migraine resolved.     Anxiety. C/w home sertraline and Seroquel.    Hypothyroid. TSH markedly elevated to 36, T3 and T4 also low; been compliant with home levothyroxine. Appreciate inpatient endocrinology recs here. Recommending LT4 125mcg qd PO with close outpatient endo follow up and consideration of US at this time per endo, otherwise can follow up w/ Henrique Endo at [address] [facility], [address], [place], [phone].    Macrocytic anemia. Vitamin B12 and folate levels normal, could be 2/2 hypothyroid. H/H stable. Repeat as outpatient, further outpatient anemia w/u if persists.    Lightheadedness: Transient, self resolved, no syncope or LOC. Orthostats negative.     47y Female with PMH of depression, anxiety, and thyroid cancer s/p left partial thyroidectomy (1996) and central neck dissection (5/22) with recent admission at [facility] for left facial abscess p/w recurrent abscess, failed output PO abx. S/p OR for with ENT for facial abscess. CT head with no extracranial extension.     Facial abscess. ENT, dental and OMFS consulted, s/p I&D of abscess and tooth extraction 1/12; CT max facial shows worsening abscess. Previous abscess culture grew strep anginosus and Prevotella denticola. S/p OR with ENT on 1/18, procedure note patient with hematoma evacuation.Abscess culture showing Strep mitis oralis. BCX NGTD. C/w Peridex swish and spit BID. Transitioned to augmentin on discharge 875 q12hrs x 2 weeks with ENT and ID follow up. C/w pain control - PRN oxycodone, avoiding IV opioids as able, continue to monitor closely. Patient reporting continued facial pain, ENT informed, evaluated, per ENT stable for discharge with close outpatient follow up.    Migraine. Patient with new headaches with floaters, Now resolved. Seen by Ophtho, no overt concerns at this time, if experiences significant burst of floaters, flashing lights, curtain over vision, pt to seek urgent medical care. CTH and CT maxillofacial reviewed, patient with no extracranial tracking, abscess location decreased compared to previous imaging. ENT following. Migraine resolved.     Anxiety. C/w home sertraline and Seroquel.    Hypothyroid. TSH markedly elevated to 36, T3 and T4 also low; been compliant with home levothyroxine. Appreciate inpatient endocrinology recs here. Recommending LT4 125mcg qd PO with close outpatient endo follow up and consideration of US at this time per endo, otherwise can follow up w/ Henrique Endo at [address] [facility], [address], [place], [phone].    Macrocytic anemia. Vitamin B12 and folate levels normal, could be 2/2 hypothyroid. H/H stable. Repeat as outpatient, further outpatient anemia w/u if persists.    Lightheadedness: Transient, self resolved, no syncope or LOC. Orthostats negative.    On 01/30/23, case was discussed with Dr. Youssef, patient is medically cleared and optimized for discharge today. All medications were reviewed with attending, and sent to mutually agreed upon pharmacy.

## 2023-01-20 NOTE — PROGRESS NOTE ADULT - PROBLEM SELECTOR PLAN 1
- Consider acute pain service for continued pain  - ENT to monitor surgical sight for pain/swelling   -continue IV per medicine service   - continue peridex rinses   - ENT will follow, page with questions

## 2023-01-20 NOTE — PROGRESS NOTE ADULT - ASSESSMENT
47y Female with PMH of depression, anxiety, and thyroid cancer s/p left partial thyroidectomy (1996) and central neck dissection (5/22) with recent admission at [facility] for left facial abscess p/w recurrent abscess, failed output PO abx. S/p OR for with ENT for facial abscess. CT head with no extracranial

## 2023-01-20 NOTE — PROGRESS NOTE ADULT - PROBLEM SELECTOR PLAN 1
ENT, dental and OMFS consulted, /p I&D of abscess and tooth extraction 1/12; CT max facial shows worsening abscess  - previous abscess culture grew strep anginosus and Prevotella denticola  - c/w Unasyn, currently on day 10 will likely complete a total 14 day course once abscess drained   - s/p OR with ENT on 1/18, procedure note patient with hematoma evacuation, per ENT want to monitor patient for pain and swelling   - Abscess culture growing, Abscess culture showing Strep mitis oralis  - BCX NGTD  - c/w Peridex swish and spit BID  - pain control with morphine 2mg and 4mg q4hrs PRN,   - c/w oxy 10mg PRN

## 2023-01-20 NOTE — DISCHARGE NOTE PROVIDER - CARE PROVIDERS DIRECT ADDRESSES
[email or web address]

## 2023-01-20 NOTE — PROGRESS NOTE ADULT - SUBJECTIVE AND OBJECTIVE BOX
[facility] Division of Hospital Medicine  Vlad Goldnancy PEOPLES  Pager [phone]  Available via MS Teams    SUBJECTIVE / OVERNIGHT EVENTS: Swelling in left check improved. Still has pain. Headaches are improved today, denies any floaters     ADDITIONAL REVIEW OF SYSTEMS:    MEDICATIONS  (STANDING):  ampicillin/sulbactam  IVPB 3 Gram(s) IV Intermittent every 6 hours  chlorhexidine 0.12% Liquid 15 milliLiter(s) Swish and Spit two times a day  enoxaparin Injectable 40 milliGRAM(s) SubCutaneous every 24 hours  famotidine    Tablet 20 milliGRAM(s) Oral daily  gabapentin 200 milliGRAM(s) Oral every 8 hours  influenza   Vaccine 0.5 milliLiter(s) IntraMuscular once  levothyroxine Injectable 90 MICROGram(s) IV Push <User Schedule>  polyethylene glycol 3350 17 Gram(s) Oral daily  QUEtiapine 50 milliGRAM(s) Oral daily  senna 2 Tablet(s) Oral at bedtime  sertraline 50 milliGRAM(s) Oral daily  sodium chloride 0.9%. 1000 milliLiter(s) (100 mL/Hr) IV Continuous <Continuous>    MEDICATIONS  (PRN):  acetaminophen     Tablet .. 650 milliGRAM(s) Oral every 6 hours PRN Temp greater or equal to 38C (100.4F), Mild Pain (1 - 3)  morphine  - Injectable 4 milliGRAM(s) IV Push every 4 hours PRN Severe Pain (7 - 10)  oxyCODONE    IR 10 milliGRAM(s) Oral every 4 hours PRN Mild Pain (1 - 3)      I&O's Summary    19 Jan 2023 07:01  -  20 Jan 2023 07:00  --------------------------------------------------------  IN: 400 mL / OUT: 0 mL / NET: 400 mL        PHYSICAL EXAM:  Vital Signs Last 24 Hrs  T(C): 36.6 (20 Jan 2023 13:40), Max: 36.8 (19 Jan 2023 18:16)  T(F): 97.9 (20 Jan 2023 13:40), Max: 98.2 (19 Jan 2023 18:16)  HR: 81 (20 Jan 2023 13:40) (69 - 84)  BP: 114/71 (20 Jan 2023 13:40) (110/67 - 127/76)  BP(mean): --  RR: 18 (20 Jan 2023 13:40) (17 - 18)  SpO2: 100% (20 Jan 2023 13:40) (96% - 100%)    Parameters below as of 20 Jan 2023 13:40  Patient On (Oxygen Delivery Method): room air      CONSTITUTIONAL: NAD, well-developed, well-groomed  EYES: PERRLA; conjunctiva and sclera clear  ENMT: Moist oral mucosa, + facial swelling tender to touch, decreased in size  NECK: Supple, no palpable masses; no thyromegaly  RESPIRATORY: Normal respiratory effort; lungs are clear to auscultation bilaterally  CARDIOVASCULAR: Regular rate and rhythm, normal S1 and S2, no murmur/rub/gallop; No lower extremity edema; Peripheral pulses are 2+ bilaterally  ABDOMEN: Nontender to palpation, normoactive bowel sounds, no rebound/guarding; No hepatosplenomegaly  MUSCULOSKELETAL:  Normal gait; no clubbing or cyanosis of digits; no joint swelling or tenderness to palpation  PSYCH: A+O to person, place, and time; affect appropriate  NEUROLOGY: CN 2-12 are intact and symmetric; no gross sensory deficits   SKIN: No rashes; no palpable lesions    LABS:                        9.9    6.57  )-----------( 287      ( 20 Jan 2023 05:29 )             31.2     01-20    140  |  102  |  9   ----------------------------<  95  4.1   |  30  |  0.90    Ca    8.7      20 Jan 2023 05:29  Phos  3.9     01-19  Mg     2.00     01-20                COVID-19 PCR: NotDetec (17 Jan 2023 07:30)  COVID-19 PCR: NotDetec (03 Jan 2023 02:00)      RADIOLOGY & ADDITIONAL TESTS:  New Results Reviewed Today: YEs   New Imaging Personally Reviewed Today: yes; CT head and CT maxillofacial

## 2023-01-20 NOTE — PROGRESS NOTE ADULT - ASSESSMENT
47y female with a past medical history/ocular history of depression, anxiety, and thyroid cancer s/p left partial thyroidectomy (1996) and central neck dissection (5/22) and new recurrent facial abscesses with drainage consulted for new floater in the left eye, found to have benign retinal exam, floater in the right eye.    #Floater, both eyes  - patient denies flashes, burst of floaters, transient vision loss, trauma, black curtain falling over the vision  - dilated exam with no tears, holes, detachment 360  - likely floater as part of vitreous syneresis   - retinal detachment precautions given to patient; please notify ophthalmology if patient experiences significant burst of floaters, flashing lights, curtain over vision  - will follow up with patient outpatient once discharged    #Family history of glaucoma  - IOPs today are within normal limits, EOMI  - will follow up with visual field testing and OCT N testing outpatient    Pt seen and discussed with Bud Nicholson    Outpatient Follow-up: Patient should follow-up with his/her ophthalmologist or with [facility] Department of Ophthalmology within 1 week of after discharge at:    [address]  [place] [address]  [phone]   47y female with a past medical history/ocular history of depression, anxiety, and thyroid cancer s/p left partial thyroidectomy (1996) and central neck dissection (5/22) and new recurrent facial abscesses with drainage consulted for new floater in the left eye, found to have benign retinal exam, floater in the right eye.    #Floater, both eyes  - patient denies flashes, burst of floaters, transient vision loss, trauma, black curtain falling over the vision  - dilated exam with no tears, holes, detachment 360  - likely floater as part of vitreous syneresis   - retinal detachment precautions given to patient; please notify ophthalmology if patient experiences significant burst of floaters, flashing lights, curtain over vision  - will follow up with patient outpatient once discharged    #Family history of glaucoma  - IOPs today are within normal limits, EOMI  - will follow up with visual field testing and OCT N testing outpatient    Pt seen and discussed with Dr. Denny, Bud    Ophthalmology signing off, please re-consult as needed.  Outpatient Follow-up: Patient should follow-up with his/her ophthalmologist or with [facility] Department of Ophthalmology within 1 week of after discharge at:    [address] [facility]. [address]  [place] [address]  [phone]

## 2023-01-20 NOTE — DISCHARGE NOTE PROVIDER - PROVIDER TOKENS
PROVIDER:[TOKEN:[[id number]]],FREE:[LAST:[Dr. Elvia Laws],PHONE:[phone]],FAX:[(   )    -],ADDRESS:[[place] [address]]],FREE:[LAST:[[facility]],PHONE:[phone]],FAX:[(   )    -],ADDRESS:[[address] [place] [address]]],PROVIDER:[TOKEN:[[id number]]]

## 2023-01-21 LAB
ANION GAP SERPL CALC-SCNC: 10 MMOL/L — SIGNIFICANT CHANGE UP (ref 7–14)
BASOPHILS # BLD AUTO: 0.07 K/UL — SIGNIFICANT CHANGE UP (ref 0–0.2)
BASOPHILS NFR BLD AUTO: 1.6 % — SIGNIFICANT CHANGE UP (ref 0–2)
BUN SERPL-MCNC: 10 MG/DL — SIGNIFICANT CHANGE UP (ref 7–23)
CALCIUM SERPL-MCNC: 8.6 MG/DL — SIGNIFICANT CHANGE UP (ref 8.4–10.5)
CHLORIDE SERPL-SCNC: 104 MMOL/L — SIGNIFICANT CHANGE UP (ref 98–107)
CO2 SERPL-SCNC: 26 MMOL/L — SIGNIFICANT CHANGE UP (ref 22–31)
CREAT SERPL-MCNC: 0.8 MG/DL — SIGNIFICANT CHANGE UP (ref 0.5–1.3)
EGFR: 91 ML/MIN/1.73M2 — SIGNIFICANT CHANGE UP
EOSINOPHIL # BLD AUTO: 0.24 K/UL — SIGNIFICANT CHANGE UP (ref 0–0.5)
EOSINOPHIL NFR BLD AUTO: 5.5 % — SIGNIFICANT CHANGE UP (ref 0–6)
GLUCOSE SERPL-MCNC: 90 MG/DL — SIGNIFICANT CHANGE UP (ref 70–99)
HCT VFR BLD CALC: 28.9 % — LOW (ref 34.5–45)
HGB BLD-MCNC: 9.2 G/DL — LOW (ref 11.5–15.5)
IANC: 2.19 K/UL — SIGNIFICANT CHANGE UP (ref 1.8–7.4)
IMM GRANULOCYTES NFR BLD AUTO: 0.2 % — SIGNIFICANT CHANGE UP (ref 0–0.9)
LYMPHOCYTES # BLD AUTO: 1.45 K/UL — SIGNIFICANT CHANGE UP (ref 1–3.3)
LYMPHOCYTES # BLD AUTO: 33.5 % — SIGNIFICANT CHANGE UP (ref 13–44)
MAGNESIUM SERPL-MCNC: 2 MG/DL — SIGNIFICANT CHANGE UP (ref 1.6–2.6)
MCHC RBC-ENTMCNC: 31.8 GM/DL — LOW (ref 32–36)
MCHC RBC-ENTMCNC: 35.5 PG — HIGH (ref 27–34)
MCV RBC AUTO: 111.6 FL — HIGH (ref 80–100)
MONOCYTES # BLD AUTO: 0.37 K/UL — SIGNIFICANT CHANGE UP (ref 0–0.9)
MONOCYTES NFR BLD AUTO: 8.5 % — SIGNIFICANT CHANGE UP (ref 2–14)
NEUTROPHILS # BLD AUTO: 2.19 K/UL — SIGNIFICANT CHANGE UP (ref 1.8–7.4)
NEUTROPHILS NFR BLD AUTO: 50.7 % — SIGNIFICANT CHANGE UP (ref 43–77)
NRBC # BLD: 0 /100 WBCS — SIGNIFICANT CHANGE UP (ref 0–0)
NRBC # FLD: 0 K/UL — SIGNIFICANT CHANGE UP (ref 0–0)
PHOSPHATE SERPL-MCNC: 5.1 MG/DL — HIGH (ref 2.5–4.5)
PLATELET # BLD AUTO: 260 K/UL — SIGNIFICANT CHANGE UP (ref 150–400)
POTASSIUM SERPL-MCNC: 4.1 MMOL/L — SIGNIFICANT CHANGE UP (ref 3.5–5.3)
POTASSIUM SERPL-SCNC: 4.1 MMOL/L — SIGNIFICANT CHANGE UP (ref 3.5–5.3)
RBC # BLD: 2.59 M/UL — LOW (ref 3.8–5.2)
RBC # FLD: 12.8 % — SIGNIFICANT CHANGE UP (ref 10.3–14.5)
SODIUM SERPL-SCNC: 140 MMOL/L — SIGNIFICANT CHANGE UP (ref 135–145)
WBC # BLD: 4.33 K/UL — SIGNIFICANT CHANGE UP (ref 3.8–10.5)
WBC # FLD AUTO: 4.33 K/UL — SIGNIFICANT CHANGE UP (ref 3.8–10.5)

## 2023-01-21 PROCEDURE — 99232 SBSQ HOSP IP/OBS MODERATE 35: CPT

## 2023-01-21 RX ORDER — KETOROLAC TROMETHAMINE 30 MG/ML
15 SYRINGE (ML) INJECTION ONCE
Refills: 0 | Status: DISCONTINUED | OUTPATIENT
Start: 2023-01-21 | End: 2023-01-21

## 2023-01-21 RX ORDER — OXYCODONE HYDROCHLORIDE 5 MG/1
15 TABLET ORAL EVERY 6 HOURS
Refills: 0 | Status: DISCONTINUED | OUTPATIENT
Start: 2023-01-21 | End: 2023-01-23

## 2023-01-21 RX ORDER — MORPHINE SULFATE 50 MG/1
2 CAPSULE, EXTENDED RELEASE ORAL ONCE
Refills: 0 | Status: DISCONTINUED | OUTPATIENT
Start: 2023-01-21 | End: 2023-01-21

## 2023-01-21 RX ADMIN — OXYCODONE HYDROCHLORIDE 15 MILLIGRAM(S): 5 TABLET ORAL at 20:43

## 2023-01-21 RX ADMIN — Medication 15 MILLIGRAM(S): at 08:55

## 2023-01-21 RX ADMIN — SERTRALINE 50 MILLIGRAM(S): 25 TABLET, FILM COATED ORAL at 21:22

## 2023-01-21 RX ADMIN — OXYCODONE HYDROCHLORIDE 10 MILLIGRAM(S): 5 TABLET ORAL at 06:42

## 2023-01-21 RX ADMIN — GABAPENTIN 200 MILLIGRAM(S): 400 CAPSULE ORAL at 13:34

## 2023-01-21 RX ADMIN — Medication 15 MILLIGRAM(S): at 09:10

## 2023-01-21 RX ADMIN — AMPICILLIN SODIUM AND SULBACTAM SODIUM 200 GRAM(S): 250; 125 INJECTION, POWDER, FOR SUSPENSION INTRAMUSCULAR; INTRAVENOUS at 11:39

## 2023-01-21 RX ADMIN — CHLORHEXIDINE GLUCONATE 15 MILLILITER(S): 213 SOLUTION TOPICAL at 06:34

## 2023-01-21 RX ADMIN — ENOXAPARIN SODIUM 40 MILLIGRAM(S): 100 INJECTION SUBCUTANEOUS at 11:39

## 2023-01-21 RX ADMIN — GABAPENTIN 200 MILLIGRAM(S): 400 CAPSULE ORAL at 06:33

## 2023-01-21 RX ADMIN — GABAPENTIN 200 MILLIGRAM(S): 400 CAPSULE ORAL at 21:21

## 2023-01-21 RX ADMIN — OXYCODONE HYDROCHLORIDE 10 MILLIGRAM(S): 5 TABLET ORAL at 07:12

## 2023-01-21 RX ADMIN — OXYCODONE HYDROCHLORIDE 10 MILLIGRAM(S): 5 TABLET ORAL at 12:50

## 2023-01-21 RX ADMIN — AMPICILLIN SODIUM AND SULBACTAM SODIUM 200 GRAM(S): 250; 125 INJECTION, POWDER, FOR SUSPENSION INTRAMUSCULAR; INTRAVENOUS at 18:00

## 2023-01-21 RX ADMIN — MORPHINE SULFATE 2 MILLIGRAM(S): 50 CAPSULE, EXTENDED RELEASE ORAL at 17:10

## 2023-01-21 RX ADMIN — CHLORHEXIDINE GLUCONATE 15 MILLILITER(S): 213 SOLUTION TOPICAL at 18:00

## 2023-01-21 RX ADMIN — QUETIAPINE FUMARATE 50 MILLIGRAM(S): 200 TABLET, FILM COATED ORAL at 21:22

## 2023-01-21 RX ADMIN — OXYCODONE HYDROCHLORIDE 10 MILLIGRAM(S): 5 TABLET ORAL at 11:50

## 2023-01-21 RX ADMIN — OXYCODONE HYDROCHLORIDE 15 MILLIGRAM(S): 5 TABLET ORAL at 21:40

## 2023-01-21 RX ADMIN — AMPICILLIN SODIUM AND SULBACTAM SODIUM 200 GRAM(S): 250; 125 INJECTION, POWDER, FOR SUSPENSION INTRAMUSCULAR; INTRAVENOUS at 06:33

## 2023-01-21 RX ADMIN — AMPICILLIN SODIUM AND SULBACTAM SODIUM 200 GRAM(S): 250; 125 INJECTION, POWDER, FOR SUSPENSION INTRAMUSCULAR; INTRAVENOUS at 00:31

## 2023-01-21 RX ADMIN — Medication 90 MICROGRAM(S): at 06:33

## 2023-01-21 RX ADMIN — FAMOTIDINE 20 MILLIGRAM(S): 10 INJECTION INTRAVENOUS at 11:39

## 2023-01-21 RX ADMIN — MORPHINE SULFATE 2 MILLIGRAM(S): 50 CAPSULE, EXTENDED RELEASE ORAL at 16:55

## 2023-01-21 NOTE — PROGRESS NOTE ADULT - PROBLEM SELECTOR PLAN 1
ENT, dental and OMFS consulted, /p I&D of abscess and tooth extraction 1/12; CT max facial shows worsening abscess  - previous abscess culture grew strep anginosus and Prevotella denticola  - c/w Unasyn, currently on day 10 will likely complete a total 14 day course once abscess drained   - s/p OR with ENT on 1/18, procedure note patient with hematoma evacuation, per ENT want to monitor patient for pain and swelling   - Abscess culture growing, Abscess culture showing Strep mitis oralis  - BCX NGTD  - c/w Peridex swish and spit BID  - pain control with oxy 10 and 15, toradol PRN

## 2023-01-21 NOTE — PROGRESS NOTE ADULT - SUBJECTIVE AND OBJECTIVE BOX
ENT ISSUE/POD: ABscess     HPI: Pt seen and examined at bedside this AM. Pt feels better today. Reports decreased pain and swelling. Eye symptoms resolved. Still has tension like headache and some pain around left TMJ, brooke when yawning.           PAST MEDICAL & SURGICAL HISTORY:  Thyroid cancer  1996 with ? left partial thyroidectomy with NO post op chemotherapy or RT and in 2011 with total thyroidectomy with NO post op chemotherapy or RT      Neck mass  2022      H/O iron deficiency anemia  followed by hematology. Has received iron infusions. Last infusion in January 2021      Anxiety and depression      History of hoarseness  2022      Lumbar spinal stenosis  and bulging disc      Pain  cystic lesion anterior to the sacrum --- followed by neurologist      H/O thyroidectomy  1996 partial thyroidectomy and in 2011 total thyroidectomy      History of sleeve gastrectomy  has lost 280 pounds      Excessive vaginal bleeding  hyterectomy Jan 2021 -- received 7 units PRBC        Allergies    No Known Allergies    Intolerances      MEDICATIONS  (STANDING):  ampicillin/sulbactam  IVPB 3 Gram(s) IV Intermittent every 6 hours  chlorhexidine 0.12% Liquid 15 milliLiter(s) Swish and Spit two times a day  famotidine    Tablet 20 milliGRAM(s) Oral daily  gabapentin 200 milliGRAM(s) Oral every 8 hours  influenza   Vaccine 0.5 milliLiter(s) IntraMuscular once  levothyroxine Injectable 90 MICROGram(s) IV Push <User Schedule>  polyethylene glycol 3350 17 Gram(s) Oral daily  QUEtiapine 50 milliGRAM(s) Oral daily  senna 2 Tablet(s) Oral at bedtime  sertraline 50 milliGRAM(s) Oral daily  sodium chloride 0.9%. 1000 milliLiter(s) (100 mL/Hr) IV Continuous <Continuous>    MEDICATIONS  (PRN):  acetaminophen     Tablet .. 650 milliGRAM(s) Oral every 6 hours PRN Temp greater or equal to 38C (100.4F), Mild Pain (1 - 3)  morphine  - Injectable 2 milliGRAM(s) IV Push every 4 hours PRN Moderate Pain (4 - 6)  morphine  - Injectable 4 milliGRAM(s) IV Push every 4 hours PRN Severe Pain (7 - 10)  oxyCODONE    IR 10 milliGRAM(s) Oral every 4 hours PRN Mild Pain (1 - 3)         ROS:   ENT: all negative except as noted in HPI   Pulm: denies SOB, cough, hemoptysis  Neuro: denies numbness/tingling, loss of sensation  Endo: denies heat/cold intolerance, excessive sweating      Vital Signs Last 24 Hrs  T(C): 36.7 (20 Jan 2023 05:21), Max: 36.8 (19 Jan 2023 09:41)  T(F): 98 (20 Jan 2023 05:21), Max: 98.2 (19 Jan 2023 09:41)  HR: 69 (20 Jan 2023 05:21) (69 - 86)  BP: 110/67 (20 Jan 2023 05:21) (110/67 - 125/79)  BP(mean): --  RR: 18 (20 Jan 2023 05:21) (17 - 18)  SpO2: 96% (20 Jan 2023 05:21) (96% - 100%)    Parameters below as of 20 Jan 2023 05:21  Patient On (Oxygen Delivery Method): room air                              9.9    6.57  )-----------( 287      ( 20 Jan 2023 05:29 )             31.2    01-20    140  |  102  |  9   ----------------------------<  95  4.1   |  30  |  0.90    Ca    8.7      20 Jan 2023 05:29  Phos  3.9     01-19  Mg     2.00     01-20         PHYSICAL EXAM:  Gen: NAD, sitting in bed   Skin: No rashes, bruises, or lesions  Head: Normocephalic, Atraumatic  Face: + ecchymosis noted to external left cheek + swelling noted + tenderness to palpation - improving  Eyes: no scleral injection  Ears: Right -  external ear without abnormalities   Left external ear without abnormalities   Nose: Nares bilaterally patent, no discharge  Mouth: No Stridor / Drooling / Trismus.  Mucosa moist, tongue/uvula midline, - minor murkey fluid expressed from parotid duct  Lymphatic: No lymphadenopathy  Resp: breathing easily, no stridor

## 2023-01-21 NOTE — PROGRESS NOTE ADULT - SUBJECTIVE AND OBJECTIVE BOX
[facility] Division of Hospital Medicine  RHYSpascual Ana PEOPLES  Pager [phone]  Available via MS Teams    SUBJECTIVE / OVERNIGHT EVENTS: Pain still present especially with yawning.  Swelling has improved alot,     ADDITIONAL REVIEW OF SYSTEMS:    MEDICATIONS  (STANDING):  ampicillin/sulbactam  IVPB 3 Gram(s) IV Intermittent every 6 hours  chlorhexidine 0.12% Liquid 15 milliLiter(s) Swish and Spit two times a day  enoxaparin Injectable 40 milliGRAM(s) SubCutaneous every 24 hours  famotidine    Tablet 20 milliGRAM(s) Oral daily  gabapentin 200 milliGRAM(s) Oral every 8 hours  influenza   Vaccine 0.5 milliLiter(s) IntraMuscular once  levothyroxine Injectable 90 MICROGram(s) IV Push <User Schedule>  polyethylene glycol 3350 17 Gram(s) Oral daily  QUEtiapine 50 milliGRAM(s) Oral daily  senna 2 Tablet(s) Oral at bedtime  sertraline 50 milliGRAM(s) Oral daily  sodium chloride 0.9%. 1000 milliLiter(s) (100 mL/Hr) IV Continuous <Continuous>    MEDICATIONS  (PRN):  acetaminophen     Tablet .. 650 milliGRAM(s) Oral every 6 hours PRN Temp greater or equal to 38C (100.4F), Mild Pain (1 - 3)  oxyCODONE    IR 15 milliGRAM(s) Oral every 6 hours PRN Severe Pain (7 - 10)  oxyCODONE    IR 10 milliGRAM(s) Oral every 4 hours PRN Mild Pain (1 - 3)      I&O's Summary      PHYSICAL EXAM:  Vital Signs Last 24 Hrs  T(C): 36.4 (21 Jan 2023 13:58), Max: 36.8 (21 Jan 2023 06:40)  T(F): 97.6 (21 Jan 2023 13:58), Max: 98.3 (21 Jan 2023 06:40)  HR: 72 (21 Jan 2023 13:58) (67 - 90)  BP: 110/65 (21 Jan 2023 13:58) (104/64 - 135/80)  BP(mean): --  RR: 18 (21 Jan 2023 13:58) (17 - 18)  SpO2: 97% (21 Jan 2023 13:58) (95% - 98%)    Parameters below as of 21 Jan 2023 13:58  Patient On (Oxygen Delivery Method): room air      CONSTITUTIONAL: NAD, well-developed, well-groomed  EYES: PERRLA; conjunctiva and sclera clear  ENMT: Moist oral mucosa, + swelling on left side of mouth improved, decreased erythema, no pharyngeal injection or exudates; normal dentition  RESPIRATORY: Normal respiratory effort; lungs are clear to auscultation bilaterally  CARDIOVASCULAR: Regular rate and rhythm, normal S1 and S2, no murmur/rub/gallop; No lower extremity edema; Peripheral pulses are 2+ bilaterally  ABDOMEN: Nontender to palpation, normoactive bowel sounds, no rebound/guarding; No hepatosplenomegaly  MUSCULOSKELETAL:  Normal gait; no clubbing or cyanosis of digits; no joint swelling or tenderness to palpation  PSYCH: A+O to person, place, and time; affect appropriate  NEUROLOGY: CN 2-12 are intact and symmetric; no gross sensory deficits   SKIN: No rashes; no palpable lesions    LABS:                        9.2    4.33  )-----------( 260      ( 21 Jan 2023 05:55 )             28.9     01-21    140  |  104  |  10  ----------------------------<  90  4.1   |  26  |  0.80    Ca    8.6      21 Jan 2023 05:55  Phos  5.1     01-21  Mg     2.00     01-21                COVID-19 PCR: NotDetec (17 Jan 2023 07:30)  COVID-19 PCR: NotDetec (03 Jan 2023 02:00)      RADIOLOGY & ADDITIONAL TESTS:  New Results Reviewed Today:   New Imaging Personally Reviewed Today:  New Electrocardiogram Personally Reviewed Today:  Prior or Outpatient Records Reviewed Today:    COMMUNICATION:  Care Discussed with Consultants/Other Providers and Details of Discussion:  Discussions with Patient/Family:  PCP Communication:

## 2023-01-21 NOTE — PROGRESS NOTE ADULT - ASSESSMENT
47y Female with PMH of depression, anxiety, and thyroid cancer s/p left partial thyroidectomy (1996) and central neck dissection (5/22) with recent admission at [facility] for left facial abscess p/w recurrent abscess, failed output PO abx. S/p OR for with ENT for facial abscess. CT head with no extracranial

## 2023-01-22 LAB
ANION GAP SERPL CALC-SCNC: 11 MMOL/L — SIGNIFICANT CHANGE UP (ref 7–14)
BASOPHILS # BLD AUTO: 0.07 K/UL — SIGNIFICANT CHANGE UP (ref 0–0.2)
BASOPHILS NFR BLD AUTO: 1.8 % — SIGNIFICANT CHANGE UP (ref 0–2)
BUN SERPL-MCNC: 13 MG/DL — SIGNIFICANT CHANGE UP (ref 7–23)
CALCIUM SERPL-MCNC: 8.5 MG/DL — SIGNIFICANT CHANGE UP (ref 8.4–10.5)
CHLORIDE SERPL-SCNC: 105 MMOL/L — SIGNIFICANT CHANGE UP (ref 98–107)
CO2 SERPL-SCNC: 26 MMOL/L — SIGNIFICANT CHANGE UP (ref 22–31)
CREAT SERPL-MCNC: 0.87 MG/DL — SIGNIFICANT CHANGE UP (ref 0.5–1.3)
EGFR: 82 ML/MIN/1.73M2 — SIGNIFICANT CHANGE UP
EOSINOPHIL # BLD AUTO: 0.29 K/UL — SIGNIFICANT CHANGE UP (ref 0–0.5)
EOSINOPHIL NFR BLD AUTO: 7.3 % — HIGH (ref 0–6)
GLUCOSE SERPL-MCNC: 94 MG/DL — SIGNIFICANT CHANGE UP (ref 70–99)
HCT VFR BLD CALC: 29.7 % — LOW (ref 34.5–45)
HGB BLD-MCNC: 9.5 G/DL — LOW (ref 11.5–15.5)
IANC: 1.9 K/UL — SIGNIFICANT CHANGE UP (ref 1.8–7.4)
IMM GRANULOCYTES NFR BLD AUTO: 0.3 % — SIGNIFICANT CHANGE UP (ref 0–0.9)
LYMPHOCYTES # BLD AUTO: 1.25 K/UL — SIGNIFICANT CHANGE UP (ref 1–3.3)
LYMPHOCYTES # BLD AUTO: 31.6 % — SIGNIFICANT CHANGE UP (ref 13–44)
MAGNESIUM SERPL-MCNC: 1.9 MG/DL — SIGNIFICANT CHANGE UP (ref 1.6–2.6)
MCHC RBC-ENTMCNC: 32 GM/DL — SIGNIFICANT CHANGE UP (ref 32–36)
MCHC RBC-ENTMCNC: 35.6 PG — HIGH (ref 27–34)
MCV RBC AUTO: 111.2 FL — HIGH (ref 80–100)
MONOCYTES # BLD AUTO: 0.44 K/UL — SIGNIFICANT CHANGE UP (ref 0–0.9)
MONOCYTES NFR BLD AUTO: 11.1 % — SIGNIFICANT CHANGE UP (ref 2–14)
NEUTROPHILS # BLD AUTO: 1.9 K/UL — SIGNIFICANT CHANGE UP (ref 1.8–7.4)
NEUTROPHILS NFR BLD AUTO: 47.9 % — SIGNIFICANT CHANGE UP (ref 43–77)
NRBC # BLD: 0 /100 WBCS — SIGNIFICANT CHANGE UP (ref 0–0)
NRBC # FLD: 0 K/UL — SIGNIFICANT CHANGE UP (ref 0–0)
PHOSPHATE SERPL-MCNC: 5.2 MG/DL — HIGH (ref 2.5–4.5)
PLATELET # BLD AUTO: 267 K/UL — SIGNIFICANT CHANGE UP (ref 150–400)
POTASSIUM SERPL-MCNC: 4.1 MMOL/L — SIGNIFICANT CHANGE UP (ref 3.5–5.3)
POTASSIUM SERPL-SCNC: 4.1 MMOL/L — SIGNIFICANT CHANGE UP (ref 3.5–5.3)
RBC # BLD: 2.67 M/UL — LOW (ref 3.8–5.2)
RBC # FLD: 12.6 % — SIGNIFICANT CHANGE UP (ref 10.3–14.5)
SODIUM SERPL-SCNC: 142 MMOL/L — SIGNIFICANT CHANGE UP (ref 135–145)
WBC # BLD: 3.96 K/UL — SIGNIFICANT CHANGE UP (ref 3.8–10.5)
WBC # FLD AUTO: 3.96 K/UL — SIGNIFICANT CHANGE UP (ref 3.8–10.5)

## 2023-01-22 PROCEDURE — 99232 SBSQ HOSP IP/OBS MODERATE 35: CPT

## 2023-01-22 RX ORDER — KETOROLAC TROMETHAMINE 30 MG/ML
15 SYRINGE (ML) INJECTION EVERY 6 HOURS
Refills: 0 | Status: DISCONTINUED | OUTPATIENT
Start: 2023-01-22 | End: 2023-01-26

## 2023-01-22 RX ORDER — KETOROLAC TROMETHAMINE 30 MG/ML
15 SYRINGE (ML) INJECTION ONCE
Refills: 0 | Status: DISCONTINUED | OUTPATIENT
Start: 2023-01-22 | End: 2023-01-22

## 2023-01-22 RX ORDER — MORPHINE SULFATE 50 MG/1
2 CAPSULE, EXTENDED RELEASE ORAL ONCE
Refills: 0 | Status: DISCONTINUED | OUTPATIENT
Start: 2023-01-22 | End: 2023-01-22

## 2023-01-22 RX ADMIN — OXYCODONE HYDROCHLORIDE 10 MILLIGRAM(S): 5 TABLET ORAL at 10:51

## 2023-01-22 RX ADMIN — SERTRALINE 50 MILLIGRAM(S): 25 TABLET, FILM COATED ORAL at 21:32

## 2023-01-22 RX ADMIN — OXYCODONE HYDROCHLORIDE 15 MILLIGRAM(S): 5 TABLET ORAL at 06:47

## 2023-01-22 RX ADMIN — AMPICILLIN SODIUM AND SULBACTAM SODIUM 200 GRAM(S): 250; 125 INJECTION, POWDER, FOR SUSPENSION INTRAMUSCULAR; INTRAVENOUS at 00:02

## 2023-01-22 RX ADMIN — AMPICILLIN SODIUM AND SULBACTAM SODIUM 200 GRAM(S): 250; 125 INJECTION, POWDER, FOR SUSPENSION INTRAMUSCULAR; INTRAVENOUS at 06:37

## 2023-01-22 RX ADMIN — MORPHINE SULFATE 2 MILLIGRAM(S): 50 CAPSULE, EXTENDED RELEASE ORAL at 23:00

## 2023-01-22 RX ADMIN — OXYCODONE HYDROCHLORIDE 15 MILLIGRAM(S): 5 TABLET ORAL at 18:00

## 2023-01-22 RX ADMIN — ENOXAPARIN SODIUM 40 MILLIGRAM(S): 100 INJECTION SUBCUTANEOUS at 12:33

## 2023-01-22 RX ADMIN — OXYCODONE HYDROCHLORIDE 10 MILLIGRAM(S): 5 TABLET ORAL at 11:30

## 2023-01-22 RX ADMIN — AMPICILLIN SODIUM AND SULBACTAM SODIUM 200 GRAM(S): 250; 125 INJECTION, POWDER, FOR SUSPENSION INTRAMUSCULAR; INTRAVENOUS at 17:19

## 2023-01-22 RX ADMIN — AMPICILLIN SODIUM AND SULBACTAM SODIUM 200 GRAM(S): 250; 125 INJECTION, POWDER, FOR SUSPENSION INTRAMUSCULAR; INTRAVENOUS at 12:32

## 2023-01-22 RX ADMIN — Medication 15 MILLIGRAM(S): at 01:12

## 2023-01-22 RX ADMIN — FAMOTIDINE 20 MILLIGRAM(S): 10 INJECTION INTRAVENOUS at 12:33

## 2023-01-22 RX ADMIN — CHLORHEXIDINE GLUCONATE 15 MILLILITER(S): 213 SOLUTION TOPICAL at 06:36

## 2023-01-22 RX ADMIN — GABAPENTIN 200 MILLIGRAM(S): 400 CAPSULE ORAL at 12:32

## 2023-01-22 RX ADMIN — OXYCODONE HYDROCHLORIDE 15 MILLIGRAM(S): 5 TABLET ORAL at 17:19

## 2023-01-22 RX ADMIN — CHLORHEXIDINE GLUCONATE 15 MILLILITER(S): 213 SOLUTION TOPICAL at 17:19

## 2023-01-22 RX ADMIN — Medication 15 MILLIGRAM(S): at 01:45

## 2023-01-22 RX ADMIN — GABAPENTIN 200 MILLIGRAM(S): 400 CAPSULE ORAL at 06:35

## 2023-01-22 RX ADMIN — Medication 90 MICROGRAM(S): at 06:41

## 2023-01-22 RX ADMIN — GABAPENTIN 200 MILLIGRAM(S): 400 CAPSULE ORAL at 21:31

## 2023-01-22 RX ADMIN — QUETIAPINE FUMARATE 50 MILLIGRAM(S): 200 TABLET, FILM COATED ORAL at 21:31

## 2023-01-22 RX ADMIN — OXYCODONE HYDROCHLORIDE 15 MILLIGRAM(S): 5 TABLET ORAL at 07:42

## 2023-01-22 RX ADMIN — MORPHINE SULFATE 2 MILLIGRAM(S): 50 CAPSULE, EXTENDED RELEASE ORAL at 22:28

## 2023-01-22 NOTE — PROGRESS NOTE ADULT - ASSESSMENT
47y Female with PMH of depression, anxiety, and thyroid cancer s/p left partial thyroidectomy (1996) and central neck dissection (5/22) with recent admission at [facility] for left facial abscess p/w recurrent abscess, failed output PO abx. S/p OR for with ENT for facial abscess. CT head with no extracranial extension. Still here for pain control

## 2023-01-22 NOTE — PROGRESS NOTE ADULT - PROBLEM SELECTOR PLAN 1
ENT, dental and OMFS consulted, /p I&D of abscess and tooth extraction 1/12; CT max facial shows worsening abscess  - previous abscess culture grew strep anginosus and Prevotella denticola  - c/w Unasyn, currently on day 12 will likely complete a total 14 day course once abscess drained   - s/p OR with ENT on 1/18, procedure note patient with hematoma evacuation, per ENT want to monitor patient for pain and swelling   - Abscess culture growing, Abscess culture showing Strep mitis oralis  - BCX NGTD  - c/w Peridex swish and spit BID  - pain control with oxy 10 and 15, toradol PRN  - patient states that she does not feel comfortable going home today as pain is still present, feels like Toradol helps a lot with inflammation

## 2023-01-22 NOTE — PROGRESS NOTE ADULT - SUBJECTIVE AND OBJECTIVE BOX
ENT ISSUE/POD: Abscess     HPI: Pt seen and examined at bedside this AM. Pt feels better today. Reports decreased pain and swelling. Eye symptoms resolved. Continues to report tension like headache and some pain around left TMJ, brooke when yawning.           PAST MEDICAL & SURGICAL HISTORY:  Thyroid cancer  1996 with ? left partial thyroidectomy with NO post op chemotherapy or RT and in 2011 with total thyroidectomy with NO post op chemotherapy or RT      Neck mass  2022      H/O iron deficiency anemia  followed by hematology. Has received iron infusions. Last infusion in January 2021      Anxiety and depression      History of hoarseness  2022      Lumbar spinal stenosis  and bulging disc      Pain  cystic lesion anterior to the sacrum --- followed by neurologist      H/O thyroidectomy  1996 partial thyroidectomy and in 2011 total thyroidectomy      History of sleeve gastrectomy  has lost 280 pounds      Excessive vaginal bleeding  hyterectomy Jan 2021 -- received 7 units PRBC        Allergies    No Known Allergies    Intolerances      MEDICATIONS  (STANDING):  ampicillin/sulbactam  IVPB 3 Gram(s) IV Intermittent every 6 hours  chlorhexidine 0.12% Liquid 15 milliLiter(s) Swish and Spit two times a day  famotidine    Tablet 20 milliGRAM(s) Oral daily  gabapentin 200 milliGRAM(s) Oral every 8 hours  influenza   Vaccine 0.5 milliLiter(s) IntraMuscular once  levothyroxine Injectable 90 MICROGram(s) IV Push <User Schedule>  polyethylene glycol 3350 17 Gram(s) Oral daily  QUEtiapine 50 milliGRAM(s) Oral daily  senna 2 Tablet(s) Oral at bedtime  sertraline 50 milliGRAM(s) Oral daily  sodium chloride 0.9%. 1000 milliLiter(s) (100 mL/Hr) IV Continuous <Continuous>    MEDICATIONS  (PRN):  acetaminophen     Tablet .. 650 milliGRAM(s) Oral every 6 hours PRN Temp greater or equal to 38C (100.4F), Mild Pain (1 - 3)  morphine  - Injectable 2 milliGRAM(s) IV Push every 4 hours PRN Moderate Pain (4 - 6)  morphine  - Injectable 4 milliGRAM(s) IV Push every 4 hours PRN Severe Pain (7 - 10)  oxyCODONE    IR 10 milliGRAM(s) Oral every 4 hours PRN Mild Pain (1 - 3)         ROS:   ENT: all negative except as noted in HPI   Pulm: denies SOB, cough, hemoptysis  Neuro: denies numbness/tingling, loss of sensation  Endo: denies heat/cold intolerance, excessive sweating      Vital Signs Last 24 Hrs  T(C): 36.5 (22 Jan 2023 09:28), Max: 36.8 (21 Jan 2023 18:00)  T(F): 97.7 (22 Jan 2023 09:28), Max: 98.2 (21 Jan 2023 18:00)  HR: 84 (22 Jan 2023 09:28) (66 - 84)  BP: 107/63 (22 Jan 2023 09:28) (107/63 - 122/78)  BP(mean): --  RR: 18 (22 Jan 2023 09:28) (17 - 18)  SpO2: 99% (22 Jan 2023 09:28) (97% - 100%)    Parameters below as of 22 Jan 2023 09:28  Patient On (Oxygen Delivery Method): room air           PHYSICAL EXAM:  Gen: NAD, sitting in bed   Skin: No rashes, bruises, or lesions  Head: Normocephalic, Atraumatic  Face: + ecchymosis noted to external left cheek + swelling noted + tenderness to palpation - improving  Eyes: no scleral injection  Ears: Right -  external ear without abnormalities   Left external ear without abnormalities   Nose: Nares bilaterally patent, no discharge  Mouth: No Stridor / Drooling / Trismus.  Mucosa moist, tongue/uvula midline, - minor murkey fluid expressed from parotid duct  Lymphatic: No lymphadenopathy  Resp: breathing easily, no stridor

## 2023-01-22 NOTE — PROGRESS NOTE ADULT - SUBJECTIVE AND OBJECTIVE BOX
[facility] Division of Hospital Medicine  Vlad Perez M.D  Pager [phone]  Available via MS Teams    SUBJECTIVE / OVERNIGHT EVENTS: Overnight, patient was noted to have mouth pain redived 1 dose of 2mg IV morphine x1.  Patient states that pain is better with toradol, feels like it helps tremendously     ADDITIONAL REVIEW OF SYSTEMS:    MEDICATIONS  (STANDING):  ampicillin/sulbactam  IVPB 3 Gram(s) IV Intermittent every 6 hours  chlorhexidine 0.12% Liquid 15 milliLiter(s) Swish and Spit two times a day  enoxaparin Injectable 40 milliGRAM(s) SubCutaneous every 24 hours  famotidine    Tablet 20 milliGRAM(s) Oral daily  gabapentin 200 milliGRAM(s) Oral every 8 hours  influenza   Vaccine 0.5 milliLiter(s) IntraMuscular once  levothyroxine Injectable 90 MICROGram(s) IV Push <User Schedule>  polyethylene glycol 3350 17 Gram(s) Oral daily  QUEtiapine 50 milliGRAM(s) Oral daily  senna 2 Tablet(s) Oral at bedtime  sertraline 50 milliGRAM(s) Oral daily  sodium chloride 0.9%. 1000 milliLiter(s) (100 mL/Hr) IV Continuous <Continuous>    MEDICATIONS  (PRN):  acetaminophen     Tablet .. 650 milliGRAM(s) Oral every 6 hours PRN Temp greater or equal to 38C (100.4F), Mild Pain (1 - 3)  oxyCODONE    IR 10 milliGRAM(s) Oral every 4 hours PRN Mild Pain (1 - 3)  oxyCODONE    IR 15 milliGRAM(s) Oral every 6 hours PRN Severe Pain (7 - 10)      I&O's Summary      PHYSICAL EXAM:  Vital Signs Last 24 Hrs  T(C): 36.5 (22 Jan 2023 09:28), Max: 36.8 (21 Jan 2023 18:00)  T(F): 97.7 (22 Jan 2023 09:28), Max: 98.2 (21 Jan 2023 18:00)  HR: 84 (22 Jan 2023 09:28) (66 - 84)  BP: 107/63 (22 Jan 2023 09:28) (107/63 - 122/78)  BP(mean): --  RR: 18 (22 Jan 2023 09:28) (17 - 18)  SpO2: 99% (22 Jan 2023 09:28) (98% - 100%)    Parameters below as of 22 Jan 2023 09:28  Patient On (Oxygen Delivery Method): room air      CONSTITUTIONAL: NAD, well-developed, well-groomed  EYES: PERRLA; conjunctiva and sclera clear  ENMT: Moist oral mucosa, left mouth swelling much improved  NECK: Supple, no palpable masses; no thyromegaly  RESPIRATORY: Normal respiratory effort; lungs are clear to auscultation bilaterally  CARDIOVASCULAR: Regular rate and rhythm, normal S1 and S2, no murmur/rub/gallop  ABDOMEN: Nontender to palpation, normoactive bowel sounds, no rebound/guarding; No hepatosplenomegaly  MUSCULOSKELETAL:  Normal gait; no clubbing or cyanosis of digits; no joint swelling or tenderness to palpation  PSYCH: A+O to person, place, and time; affect appropriate  NEUROLOGY: CN 2-12 are intact and symmetric; no gross sensory deficits   SKIN: No rashes; no palpable lesions    LABS:                        9.5    3.96  )-----------( 267      ( 22 Jan 2023 06:08 )             29.7     01-22    142  |  105  |  13  ----------------------------<  94  4.1   |  26  |  0.87    Ca    8.5      22 Jan 2023 06:08  Phos  5.2     01-22  Mg     1.90     01-22                COVID-19 PCR: NotDetec (17 Jan 2023 07:30)  COVID-19 PCR: NotDetec (03 Jan 2023 02:00)

## 2023-01-23 LAB
ANION GAP SERPL CALC-SCNC: 8 MMOL/L — SIGNIFICANT CHANGE UP (ref 7–14)
BASOPHILS # BLD AUTO: 0.06 K/UL — SIGNIFICANT CHANGE UP (ref 0–0.2)
BASOPHILS NFR BLD AUTO: 1.5 % — SIGNIFICANT CHANGE UP (ref 0–2)
BUN SERPL-MCNC: 13 MG/DL — SIGNIFICANT CHANGE UP (ref 7–23)
CALCIUM SERPL-MCNC: 8.6 MG/DL — SIGNIFICANT CHANGE UP (ref 8.4–10.5)
CHLORIDE SERPL-SCNC: 102 MMOL/L — SIGNIFICANT CHANGE UP (ref 98–107)
CO2 SERPL-SCNC: 29 MMOL/L — SIGNIFICANT CHANGE UP (ref 22–31)
CREAT SERPL-MCNC: 0.95 MG/DL — SIGNIFICANT CHANGE UP (ref 0.5–1.3)
EGFR: 74 ML/MIN/1.73M2 — SIGNIFICANT CHANGE UP
EOSINOPHIL # BLD AUTO: 0.32 K/UL — SIGNIFICANT CHANGE UP (ref 0–0.5)
EOSINOPHIL NFR BLD AUTO: 7.8 % — HIGH (ref 0–6)
GLUCOSE SERPL-MCNC: 70 MG/DL — SIGNIFICANT CHANGE UP (ref 70–99)
HCT VFR BLD CALC: 31.5 % — LOW (ref 34.5–45)
HGB BLD-MCNC: 10 G/DL — LOW (ref 11.5–15.5)
IANC: 2.18 K/UL — SIGNIFICANT CHANGE UP (ref 1.8–7.4)
IMM GRANULOCYTES NFR BLD AUTO: 0.2 % — SIGNIFICANT CHANGE UP (ref 0–0.9)
LYMPHOCYTES # BLD AUTO: 1.16 K/UL — SIGNIFICANT CHANGE UP (ref 1–3.3)
LYMPHOCYTES # BLD AUTO: 28.4 % — SIGNIFICANT CHANGE UP (ref 13–44)
MAGNESIUM SERPL-MCNC: 2 MG/DL — SIGNIFICANT CHANGE UP (ref 1.6–2.6)
MCHC RBC-ENTMCNC: 31.7 GM/DL — LOW (ref 32–36)
MCHC RBC-ENTMCNC: 35.6 PG — HIGH (ref 27–34)
MCV RBC AUTO: 112.1 FL — HIGH (ref 80–100)
MONOCYTES # BLD AUTO: 0.35 K/UL — SIGNIFICANT CHANGE UP (ref 0–0.9)
MONOCYTES NFR BLD AUTO: 8.6 % — SIGNIFICANT CHANGE UP (ref 2–14)
NEUTROPHILS # BLD AUTO: 2.18 K/UL — SIGNIFICANT CHANGE UP (ref 1.8–7.4)
NEUTROPHILS NFR BLD AUTO: 53.5 % — SIGNIFICANT CHANGE UP (ref 43–77)
NRBC # BLD: 0 /100 WBCS — SIGNIFICANT CHANGE UP (ref 0–0)
NRBC # FLD: 0 K/UL — SIGNIFICANT CHANGE UP (ref 0–0)
PHOSPHATE SERPL-MCNC: 4.7 MG/DL — HIGH (ref 2.5–4.5)
PLATELET # BLD AUTO: 310 K/UL — SIGNIFICANT CHANGE UP (ref 150–400)
POTASSIUM SERPL-MCNC: 4.1 MMOL/L — SIGNIFICANT CHANGE UP (ref 3.5–5.3)
POTASSIUM SERPL-SCNC: 4.1 MMOL/L — SIGNIFICANT CHANGE UP (ref 3.5–5.3)
RBC # BLD: 2.81 M/UL — LOW (ref 3.8–5.2)
RBC # FLD: 12.7 % — SIGNIFICANT CHANGE UP (ref 10.3–14.5)
SODIUM SERPL-SCNC: 139 MMOL/L — SIGNIFICANT CHANGE UP (ref 135–145)
WBC # BLD: 4.08 K/UL — SIGNIFICANT CHANGE UP (ref 3.8–10.5)
WBC # FLD AUTO: 4.08 K/UL — SIGNIFICANT CHANGE UP (ref 3.8–10.5)

## 2023-01-23 PROCEDURE — 99232 SBSQ HOSP IP/OBS MODERATE 35: CPT

## 2023-01-23 RX ADMIN — GABAPENTIN 200 MILLIGRAM(S): 400 CAPSULE ORAL at 22:25

## 2023-01-23 RX ADMIN — CHLORHEXIDINE GLUCONATE 15 MILLILITER(S): 213 SOLUTION TOPICAL at 18:31

## 2023-01-23 RX ADMIN — AMPICILLIN SODIUM AND SULBACTAM SODIUM 200 GRAM(S): 250; 125 INJECTION, POWDER, FOR SUSPENSION INTRAMUSCULAR; INTRAVENOUS at 18:31

## 2023-01-23 RX ADMIN — Medication 15 MILLIGRAM(S): at 06:43

## 2023-01-23 RX ADMIN — Medication 15 MILLIGRAM(S): at 05:42

## 2023-01-23 RX ADMIN — GABAPENTIN 200 MILLIGRAM(S): 400 CAPSULE ORAL at 14:42

## 2023-01-23 RX ADMIN — OXYCODONE HYDROCHLORIDE 15 MILLIGRAM(S): 5 TABLET ORAL at 00:42

## 2023-01-23 RX ADMIN — Medication 90 MICROGRAM(S): at 05:15

## 2023-01-23 RX ADMIN — OXYCODONE HYDROCHLORIDE 15 MILLIGRAM(S): 5 TABLET ORAL at 09:31

## 2023-01-23 RX ADMIN — ENOXAPARIN SODIUM 40 MILLIGRAM(S): 100 INJECTION SUBCUTANEOUS at 12:10

## 2023-01-23 RX ADMIN — AMPICILLIN SODIUM AND SULBACTAM SODIUM 200 GRAM(S): 250; 125 INJECTION, POWDER, FOR SUSPENSION INTRAMUSCULAR; INTRAVENOUS at 00:00

## 2023-01-23 RX ADMIN — AMPICILLIN SODIUM AND SULBACTAM SODIUM 200 GRAM(S): 250; 125 INJECTION, POWDER, FOR SUSPENSION INTRAMUSCULAR; INTRAVENOUS at 12:09

## 2023-01-23 RX ADMIN — GABAPENTIN 200 MILLIGRAM(S): 400 CAPSULE ORAL at 05:15

## 2023-01-23 RX ADMIN — AMPICILLIN SODIUM AND SULBACTAM SODIUM 200 GRAM(S): 250; 125 INJECTION, POWDER, FOR SUSPENSION INTRAMUSCULAR; INTRAVENOUS at 05:18

## 2023-01-23 RX ADMIN — OXYCODONE HYDROCHLORIDE 15 MILLIGRAM(S): 5 TABLET ORAL at 10:30

## 2023-01-23 RX ADMIN — OXYCODONE HYDROCHLORIDE 15 MILLIGRAM(S): 5 TABLET ORAL at 01:11

## 2023-01-23 RX ADMIN — QUETIAPINE FUMARATE 50 MILLIGRAM(S): 200 TABLET, FILM COATED ORAL at 22:25

## 2023-01-23 RX ADMIN — FAMOTIDINE 20 MILLIGRAM(S): 10 INJECTION INTRAVENOUS at 12:09

## 2023-01-23 RX ADMIN — CHLORHEXIDINE GLUCONATE 15 MILLILITER(S): 213 SOLUTION TOPICAL at 05:16

## 2023-01-23 RX ADMIN — SERTRALINE 50 MILLIGRAM(S): 25 TABLET, FILM COATED ORAL at 22:26

## 2023-01-23 NOTE — PROGRESS NOTE ADULT - ASSESSMENT
47y Female with PMH of depression, anxiety, and thyroid cancer s/p left partial thyroidectomy (1996) and central neck dissection (5/22) with recent admission at [facility] for left facial abscess p/w recurrent abscess, failed output PO abx. S/p OR for with ENT for facial abscess. CT head with no extracranial extension. Still here for pain control

## 2023-01-23 NOTE — PROGRESS NOTE ADULT - SUBJECTIVE AND OBJECTIVE BOX
Patient is a 47y old  Female who presents with a chief complaint of abscess (23 Jan 2023 07:37)    SUBJECTIVE / OVERNIGHT EVENTS: No acute events. Patient reports still significant facial pain and headache, not significantly improved with oxycodone. States she had taken oral hydrocodone/acetaminophen previously with better effect. No nausea, vomiting, fever, chills, abdominal pain, chest pain, shortness of breath.    MEDICATIONS  (STANDING):  ampicillin/sulbactam  IVPB 3 Gram(s) IV Intermittent every 6 hours  chlorhexidine 0.12% Liquid 15 milliLiter(s) Swish and Spit two times a day  enoxaparin Injectable 40 milliGRAM(s) SubCutaneous every 24 hours  famotidine    Tablet 20 milliGRAM(s) Oral daily  gabapentin 200 milliGRAM(s) Oral every 8 hours  influenza   Vaccine 0.5 milliLiter(s) IntraMuscular once  levothyroxine Injectable 90 MICROGram(s) IV Push <User Schedule>  polyethylene glycol 3350 17 Gram(s) Oral daily  QUEtiapine 50 milliGRAM(s) Oral daily  senna 2 Tablet(s) Oral at bedtime  sertraline 50 milliGRAM(s) Oral daily  sodium chloride 0.9%. 1000 milliLiter(s) (100 mL/Hr) IV Continuous <Continuous>    MEDICATIONS  (PRN):  hydrocodone  7.5 mG/acetaminophen 325 mG Solution 30 milliLiter(s) Oral every 6 hours PRN Severe Pain (7 - 10)  hydrocodone  7.5 mG/acetaminophen 325 mG Solution 15 milliLiter(s) Oral every 4 hours PRN Moderate Pain (4 - 6)  ketorolac   Injectable 15 milliGRAM(s) IV Push every 6 hours PRN Mild Pain (1 - 3)    CAPILLARY BLOOD GLUCOSE    I&O's Summary    PHYSICAL EXAM:  Vital Signs Last 24 Hrs  T(C): 36.6 (23 Jan 2023 14:15), Max: 36.7 (23 Jan 2023 05:01)  T(F): 97.9 (23 Jan 2023 14:15), Max: 98.1 (23 Jan 2023 05:01)  HR: 84 (23 Jan 2023 14:15) (68 - 84)  BP: 133/80 (23 Jan 2023 14:15) (108/66 - 133/80)  BP(mean): --  RR: 18 (23 Jan 2023 14:15) (17 - 18)  SpO2: 97% (23 Jan 2023 14:15) (97% - 100%)    Parameters below as of 23 Jan 2023 14:15  Patient On (Oxygen Delivery Method): room air    CONSTITUTIONAL: NAD, well-developed, well-groomed  EYES: conjunctiva and sclera clear  ENMT: Moist oral mucosa, left facial swelling  RESPIRATORY: Normal respiratory effort; lungs are clear to auscultation bilaterally  CARDIOVASCULAR: Regular rate and rhythm, normal S1 and S2, 1+ b/l lower extremity edema  ABDOMEN: Nontender to palpation, normoactive bowel sounds, no rebound/guarding  PSYCH: calm; affect appropriate  NEUROLOGY: moving all extremities  SKIN: No rashes    LABS:                        10.0   4.08  )-----------( 310      ( 23 Jan 2023 05:12 )             31.5     01-23    139  |  102  |  13  ----------------------------<  70  4.1   |  29  |  0.95    Ca    8.6      23 Jan 2023 05:12  Phos  4.7     01-23  Mg     2.00     01-23    RADIOLOGY & ADDITIONAL TESTS: Reviewed    COORDINATION OF CARE:  Care Discussed with Consultants/Other Providers [Y- Medicine ACP, RN, Pharmacist regarding hydrocodone/acetaminophen dosing]

## 2023-01-23 NOTE — PROGRESS NOTE ADULT - PROBLEM SELECTOR PLAN 1
ENT, dental and OMFS consulted, /p I&D of abscess and tooth extraction 1/12; CT max facial shows worsening abscess  - previous abscess culture grew strep anginosus and Prevotella denticola  - c/w Unasyn, currently on day 13 will likely complete a total 14 day course once abscess drained   - s/p OR with ENT on 1/18, procedure note patient with hematoma evacuation, per ENT want to monitor patient for pain and swelling   - Abscess culture growing, Abscess culture showing Strep mitis oralis  - BCX NGTD  - c/w Peridex swish and spit BID  - patient states pain still not well controlled, stating she had better pain relief with vicodin in the past than oxycodone. Discussed conversion and dosing with inpatient pharmacy, discontinued oxycodone and ordered PRN hydrocodone/acetaminophen (only available in liquid form in hospital per pharmacy), informed RN, follow up pain

## 2023-01-24 LAB
ANION GAP SERPL CALC-SCNC: 9 MMOL/L — SIGNIFICANT CHANGE UP (ref 7–14)
ANISOCYTOSIS BLD QL: SLIGHT — SIGNIFICANT CHANGE UP
BASOPHILS # BLD AUTO: 0.06 K/UL — SIGNIFICANT CHANGE UP (ref 0–0.2)
BASOPHILS NFR BLD AUTO: 1.7 % — SIGNIFICANT CHANGE UP (ref 0–2)
BUN SERPL-MCNC: 14 MG/DL — SIGNIFICANT CHANGE UP (ref 7–23)
CALCIUM SERPL-MCNC: 8.3 MG/DL — LOW (ref 8.4–10.5)
CHLORIDE SERPL-SCNC: 104 MMOL/L — SIGNIFICANT CHANGE UP (ref 98–107)
CO2 SERPL-SCNC: 27 MMOL/L — SIGNIFICANT CHANGE UP (ref 22–31)
CREAT SERPL-MCNC: 0.85 MG/DL — SIGNIFICANT CHANGE UP (ref 0.5–1.3)
EGFR: 84 ML/MIN/1.73M2 — SIGNIFICANT CHANGE UP
EOSINOPHIL # BLD AUTO: 0.16 K/UL — SIGNIFICANT CHANGE UP (ref 0–0.5)
EOSINOPHIL NFR BLD AUTO: 4.4 % — SIGNIFICANT CHANGE UP (ref 0–6)
GIANT PLATELETS BLD QL SMEAR: PRESENT — SIGNIFICANT CHANGE UP
GLUCOSE SERPL-MCNC: 84 MG/DL — SIGNIFICANT CHANGE UP (ref 70–99)
HCT VFR BLD CALC: 29.6 % — LOW (ref 34.5–45)
HGB BLD-MCNC: 9.6 G/DL — LOW (ref 11.5–15.5)
HYPOCHROMIA BLD QL: SLIGHT — SIGNIFICANT CHANGE UP
IANC: 1.71 K/UL — LOW (ref 1.8–7.4)
LYMPHOCYTES # BLD AUTO: 1.06 K/UL — SIGNIFICANT CHANGE UP (ref 1–3.3)
LYMPHOCYTES # BLD AUTO: 28.7 % — SIGNIFICANT CHANGE UP (ref 13–44)
MAGNESIUM SERPL-MCNC: 1.9 MG/DL — SIGNIFICANT CHANGE UP (ref 1.6–2.6)
MCHC RBC-ENTMCNC: 32.4 GM/DL — SIGNIFICANT CHANGE UP (ref 32–36)
MCHC RBC-ENTMCNC: 35.4 PG — HIGH (ref 27–34)
MCV RBC AUTO: 109.2 FL — HIGH (ref 80–100)
MICROCYTES BLD QL: SLIGHT — SIGNIFICANT CHANGE UP
MONOCYTES # BLD AUTO: 0.32 K/UL — SIGNIFICANT CHANGE UP (ref 0–0.9)
MONOCYTES NFR BLD AUTO: 8.7 % — SIGNIFICANT CHANGE UP (ref 2–14)
NEUTROPHILS # BLD AUTO: 1.89 K/UL — SIGNIFICANT CHANGE UP (ref 1.8–7.4)
NEUTROPHILS NFR BLD AUTO: 51.3 % — SIGNIFICANT CHANGE UP (ref 43–77)
OVALOCYTES BLD QL SMEAR: SLIGHT — SIGNIFICANT CHANGE UP
PHOSPHATE SERPL-MCNC: 5.1 MG/DL — HIGH (ref 2.5–4.5)
PLAT MORPH BLD: ABNORMAL
PLATELET # BLD AUTO: 250 K/UL — SIGNIFICANT CHANGE UP (ref 150–400)
PLATELET COUNT - ESTIMATE: NORMAL — SIGNIFICANT CHANGE UP
POIKILOCYTOSIS BLD QL AUTO: SLIGHT — SIGNIFICANT CHANGE UP
POTASSIUM SERPL-MCNC: 4.2 MMOL/L — SIGNIFICANT CHANGE UP (ref 3.5–5.3)
POTASSIUM SERPL-SCNC: 4.2 MMOL/L — SIGNIFICANT CHANGE UP (ref 3.5–5.3)
RBC # BLD: 2.71 M/UL — LOW (ref 3.8–5.2)
RBC # FLD: 12.6 % — SIGNIFICANT CHANGE UP (ref 10.3–14.5)
RBC BLD AUTO: ABNORMAL
SODIUM SERPL-SCNC: 140 MMOL/L — SIGNIFICANT CHANGE UP (ref 135–145)
VARIANT LYMPHS # BLD: 5.2 % — SIGNIFICANT CHANGE UP (ref 0–6)
WBC # BLD: 3.68 K/UL — LOW (ref 3.8–10.5)
WBC # FLD AUTO: 3.68 K/UL — LOW (ref 3.8–10.5)

## 2023-01-24 PROCEDURE — 99222 1ST HOSP IP/OBS MODERATE 55: CPT

## 2023-01-24 PROCEDURE — 99232 SBSQ HOSP IP/OBS MODERATE 35: CPT

## 2023-01-24 RX ORDER — ACETAMINOPHEN 500 MG
1000 TABLET ORAL ONCE
Refills: 0 | Status: COMPLETED | OUTPATIENT
Start: 2023-01-24 | End: 2023-01-24

## 2023-01-24 RX ORDER — OXYCODONE HYDROCHLORIDE 5 MG/1
15 TABLET ORAL EVERY 6 HOURS
Refills: 0 | Status: DISCONTINUED | OUTPATIENT
Start: 2023-01-24 | End: 2023-01-29

## 2023-01-24 RX ORDER — MORPHINE SULFATE 50 MG/1
2 CAPSULE, EXTENDED RELEASE ORAL ONCE
Refills: 0 | Status: DISCONTINUED | OUTPATIENT
Start: 2023-01-24 | End: 2023-01-24

## 2023-01-24 RX ORDER — OXYCODONE HYDROCHLORIDE 5 MG/1
10 TABLET ORAL EVERY 4 HOURS
Refills: 0 | Status: DISCONTINUED | OUTPATIENT
Start: 2023-01-24 | End: 2023-01-29

## 2023-01-24 RX ADMIN — AMPICILLIN SODIUM AND SULBACTAM SODIUM 200 GRAM(S): 250; 125 INJECTION, POWDER, FOR SUSPENSION INTRAMUSCULAR; INTRAVENOUS at 00:13

## 2023-01-24 RX ADMIN — CHLORHEXIDINE GLUCONATE 15 MILLILITER(S): 213 SOLUTION TOPICAL at 06:20

## 2023-01-24 RX ADMIN — OXYCODONE HYDROCHLORIDE 15 MILLIGRAM(S): 5 TABLET ORAL at 15:38

## 2023-01-24 RX ADMIN — Medication 15 MILLIGRAM(S): at 00:23

## 2023-01-24 RX ADMIN — CHLORHEXIDINE GLUCONATE 15 MILLILITER(S): 213 SOLUTION TOPICAL at 18:10

## 2023-01-24 RX ADMIN — FAMOTIDINE 20 MILLIGRAM(S): 10 INJECTION INTRAVENOUS at 12:11

## 2023-01-24 RX ADMIN — OXYCODONE HYDROCHLORIDE 15 MILLIGRAM(S): 5 TABLET ORAL at 14:38

## 2023-01-24 RX ADMIN — AMPICILLIN SODIUM AND SULBACTAM SODIUM 200 GRAM(S): 250; 125 INJECTION, POWDER, FOR SUSPENSION INTRAMUSCULAR; INTRAVENOUS at 18:10

## 2023-01-24 RX ADMIN — GABAPENTIN 200 MILLIGRAM(S): 400 CAPSULE ORAL at 06:19

## 2023-01-24 RX ADMIN — SENNA PLUS 2 TABLET(S): 8.6 TABLET ORAL at 22:20

## 2023-01-24 RX ADMIN — Medication 15 MILLIGRAM(S): at 01:23

## 2023-01-24 RX ADMIN — Medication 15 MILLIGRAM(S): at 20:00

## 2023-01-24 RX ADMIN — AMPICILLIN SODIUM AND SULBACTAM SODIUM 200 GRAM(S): 250; 125 INJECTION, POWDER, FOR SUSPENSION INTRAMUSCULAR; INTRAVENOUS at 06:16

## 2023-01-24 RX ADMIN — MORPHINE SULFATE 2 MILLIGRAM(S): 50 CAPSULE, EXTENDED RELEASE ORAL at 10:00

## 2023-01-24 RX ADMIN — Medication 400 MILLIGRAM(S): at 10:42

## 2023-01-24 RX ADMIN — MORPHINE SULFATE 2 MILLIGRAM(S): 50 CAPSULE, EXTENDED RELEASE ORAL at 22:42

## 2023-01-24 RX ADMIN — Medication 90 MICROGRAM(S): at 06:58

## 2023-01-24 RX ADMIN — GABAPENTIN 200 MILLIGRAM(S): 400 CAPSULE ORAL at 14:33

## 2023-01-24 RX ADMIN — GABAPENTIN 200 MILLIGRAM(S): 400 CAPSULE ORAL at 22:19

## 2023-01-24 RX ADMIN — ENOXAPARIN SODIUM 40 MILLIGRAM(S): 100 INJECTION SUBCUTANEOUS at 12:11

## 2023-01-24 RX ADMIN — AMPICILLIN SODIUM AND SULBACTAM SODIUM 200 GRAM(S): 250; 125 INJECTION, POWDER, FOR SUSPENSION INTRAMUSCULAR; INTRAVENOUS at 23:54

## 2023-01-24 RX ADMIN — QUETIAPINE FUMARATE 50 MILLIGRAM(S): 200 TABLET, FILM COATED ORAL at 22:20

## 2023-01-24 RX ADMIN — Medication 15 MILLIGRAM(S): at 19:14

## 2023-01-24 RX ADMIN — MORPHINE SULFATE 2 MILLIGRAM(S): 50 CAPSULE, EXTENDED RELEASE ORAL at 09:21

## 2023-01-24 RX ADMIN — MORPHINE SULFATE 2 MILLIGRAM(S): 50 CAPSULE, EXTENDED RELEASE ORAL at 23:00

## 2023-01-24 RX ADMIN — SERTRALINE 50 MILLIGRAM(S): 25 TABLET, FILM COATED ORAL at 22:19

## 2023-01-24 RX ADMIN — AMPICILLIN SODIUM AND SULBACTAM SODIUM 200 GRAM(S): 250; 125 INJECTION, POWDER, FOR SUSPENSION INTRAMUSCULAR; INTRAVENOUS at 12:10

## 2023-01-24 NOTE — PROGRESS NOTE ADULT - PROBLEM SELECTOR PLAN 1
ENT, dental and OMFS consulted, /p I&D of abscess and tooth extraction 1/12; CT max facial shows worsening abscess  - previous abscess culture grew strep anginosus and Prevotella denticola  - c/w Unasyn, currently on day 14. Discussed with ENT, recommending ID consultation for antibiotic duration and guidance, ID consulted, recs appreciated  - s/p OR with ENT on 1/18, procedure note patient with hematoma evacuation  - Abscess culture showing Strep mitis oralis  - BCX NGTD  - c/w Peridex swish and spit BID  - C/w pain control - patient stating better pain control with oxycodone over hydrocodone - transitioned back to PRN oxy, continue to monitor closely

## 2023-01-24 NOTE — PROGRESS NOTE ADULT - SUBJECTIVE AND OBJECTIVE BOX
ENT ISSUE/POD: L facial abscess    HPI: Patient seen and examined at bedside this morning with hospitalist. Patient reports she is in a lot of pain and the pain medication did not really helping her. Also she reports the left facial swelling has geting worse with extension to the left temporal and  of her lips in the past 24 hours, and felt her face is more swollen and the lump is harder. Reports only been using hot pack every hours without massage due to pain. Denies fever, chills, SOB, chest pain, and trismus.        PAST MEDICAL & SURGICAL HISTORY:  Thyroid cancer  1996 with ? left partial thyroidectomy with NO post op chemotherapy or RT and in 2011 with total thyroidectomy with NO post op chemotherapy or RT      Neck mass  2022      H/O iron deficiency anemia  followed by hematology. Has received iron infusions. Last infusion in January 2021      Anxiety and depression      History of hoarseness  2022      Lumbar spinal stenosis  and bulging disc      Pain  cystic lesion anterior to the sacrum --- followed by neurologist      H/O thyroidectomy  1996 partial thyroidectomy and in 2011 total thyroidectomy      History of sleeve gastrectomy  has lost 280 pounds      Excessive vaginal bleeding  hyterectomy Jan 2021 -- received 7 units PRBC        Allergies    No Known Allergies    Intolerances      MEDICATIONS  (STANDING):  ampicillin/sulbactam  IVPB 3 Gram(s) IV Intermittent every 6 hours  chlorhexidine 0.12% Liquid 15 milliLiter(s) Swish and Spit two times a day  enoxaparin Injectable 40 milliGRAM(s) SubCutaneous every 24 hours  famotidine    Tablet 20 milliGRAM(s) Oral daily  gabapentin 200 milliGRAM(s) Oral every 8 hours  influenza   Vaccine 0.5 milliLiter(s) IntraMuscular once  levothyroxine Injectable 90 MICROGram(s) IV Push <User Schedule>  polyethylene glycol 3350 17 Gram(s) Oral daily  QUEtiapine 50 milliGRAM(s) Oral daily  senna 2 Tablet(s) Oral at bedtime  sertraline 50 milliGRAM(s) Oral daily  sodium chloride 0.9%. 1000 milliLiter(s) (100 mL/Hr) IV Continuous <Continuous>    MEDICATIONS  (PRN):  hydrocodone  7.5 mG/acetaminophen 325 mG Solution 30 milliLiter(s) Oral every 6 hours PRN Severe Pain (7 - 10)  hydrocodone  7.5 mG/acetaminophen 325 mG Solution 15 milliLiter(s) Oral every 4 hours PRN Moderate Pain (4 - 6)  ketorolac   Injectable 15 milliGRAM(s) IV Push every 6 hours PRN Mild Pain (1 - 3)      Social History: see consult note    Family history: see consult note    ROS:   ENT: all negative except as noted in HPI   Pulm: denies SOB, cough, hemoptysis  Neuro: denies numbness/tingling, loss of sensation  Endo: denies heat/cold intolerance, excessive sweating      Vital Signs Last 24 Hrs  T(C): 36.8 (24 Jan 2023 09:26), Max: 37 (23 Jan 2023 17:50)  T(F): 98.2 (24 Jan 2023 09:26), Max: 98.6 (23 Jan 2023 17:50)  HR: 88 (24 Jan 2023 09:26) (79 - 88)  BP: 126/77 (24 Jan 2023 09:26) (118/85 - 133/80)  BP(mean): --  RR: 17 (24 Jan 2023 09:26) (17 - 18)  SpO2: 98% (24 Jan 2023 09:26) (97% - 100%)    Parameters below as of 24 Jan 2023 09:26  Patient On (Oxygen Delivery Method): room air                              9.6    3.68  )-----------( 250      ( 24 Jan 2023 05:26 )             29.6    01-24    140  |  104  |  14  ----------------------------<  84  4.2   |  27  |  0.85    Ca    8.3<L>      24 Jan 2023 05:26  Phos  5.1     01-24  Mg     1.90     01-24         PHYSICAL EXAM:  Gen: NAD  Skin: No rashes, bruises, or lesions  Head: Normocephalic, Atraumatic  Face: left facial slightly swollen along the angle of mandible and anterior parotid region, no edema or surrounding skin erythema, no fluctuance. No pus in Stensen's duct with massage this morning.   Eyes: no scleral injection  Ears: No mastoid tenderness, erythema, or ear bulging  Nose: Nares bilaterally patent, no discharge  Mouth: No Stridor / Drooling / Trismus.  Mucosa moist, tongue/uvula midline, oropharynx clear  Neck: Flat, supple, no lymphadenopathy, trachea midline, no masses  Lymphatic: No lymphadenopathy  Resp: breathing easily, no stridor  Neuro: facial nerve intact, no facial droop

## 2023-01-24 NOTE — PROGRESS NOTE ADULT - ASSESSMENT
47F with left  space tracking to temporal region s/p I&D 1/2 p/w recurrent L abscess 1/10 s/p I&D and dental extraction 1/12.   RTOR on 1/18 for continued pain and swelling to Left cheek - intraopt no pus expressed but hematoma evacuated. Reports worsening left facial swelling this morning. WBC normal and downtrending, afebrile. Culture shows streptococcus mitis/oralis group, candida tropicalis, and candida dubliniensis. Recent CT done on 1/20 shows focal fluid collection measures 1.7 x 1.8 cm without intracranial extension.

## 2023-01-24 NOTE — PROGRESS NOTE ADULT - ASSESSMENT
47y Female with PMH of depression, anxiety, and thyroid cancer s/p left partial thyroidectomy (1996) and central neck dissection (5/22) with recent admission at [facility] for left facial abscess p/w recurrent abscess, failed output PO abx. S/p OR for with ENT for facial abscess. CT head with no extracranial extension.

## 2023-01-24 NOTE — CONSULT NOTE ADULT - SUBJECTIVE AND OBJECTIVE BOX
HPI:  47y Female with PMH of depression, anxiety, and thyroid cancer s/p left partial thyroidectomy (1996) and central neck dissection (5/22) who presented with recurrent left sided facial swelling and severe pain 1/11/23.    Developed left face swelling anf pain 12/22 initially treated with augmentin; then hospitalized [place] s/p external drain and IV antibiotics.  Transferred [facility] s/p drain and treatment with Unasyn.  Reports swelling has decreased and now more prominent anteriorly.    Thyroid cancer s/p multiple resection  no chemo or RT.    Iron infusions for anemia.        PAST MEDICAL & SURGICAL HISTORY:  Thyroid cancer  1996 with ? left partial thyroidectomy with NO post op chemotherapy or RT and in 2011 with total thyroidectomy with NO post op chemotherapy or RT      Neck mass  2022      H/O iron deficiency anemia  followed by hematology. Has received iron infusions. Last infusion in January 2021      Anxiety and depression      History of hoarseness  2022      Lumbar spinal stenosis  and bulging disc      Pain  cystic lesion anterior to the sacrum --- followed by neurologist      H/O thyroidectomy  1996 partial thyroidectomy and in 2011 total thyroidectomy      History of sleeve gastrectomy  has lost 280 pounds      Excessive vaginal bleeding  hyterectomy Jan 2021 -- received 7 units PRBC          Allergies    No Known Allergies    Intolerances        ANTIMICROBIALS:  ampicillin/sulbactam  IVPB 3 every 6 hours      OTHER MEDS:  chlorhexidine 0.12% Liquid 15 milliLiter(s) Swish and Spit two times a day  enoxaparin Injectable 40 milliGRAM(s) SubCutaneous every 24 hours  famotidine    Tablet 20 milliGRAM(s) Oral daily  gabapentin 200 milliGRAM(s) Oral every 8 hours  influenza   Vaccine 0.5 milliLiter(s) IntraMuscular once  ketorolac   Injectable 15 milliGRAM(s) IV Push every 6 hours PRN  levothyroxine Injectable 90 MICROGram(s) IV Push <User Schedule>  oxyCODONE    IR 10 milliGRAM(s) Oral every 4 hours PRN  oxyCODONE    IR 15 milliGRAM(s) Oral every 6 hours PRN  polyethylene glycol 3350 17 Gram(s) Oral daily  QUEtiapine 50 milliGRAM(s) Oral daily  senna 2 Tablet(s) Oral at bedtime  sertraline 50 milliGRAM(s) Oral daily  sodium chloride 0.9%. 1000 milliLiter(s) IV Continuous <Continuous>      SOCIAL HISTORY:    FAMILY HISTORY:      REVIEW OF SYSTEMS  [  ] ROS unobtainable because:    [x  ] All other systems negative except as noted below:	    Constitutional:  [ ] fever [ ] chills  [ ] weight loss  [ ] weakness  Skin:  [ ] rash [ ] phlebitis	  Eyes: [ ] icterus [ ] pain  [ ] discharge	  ENMT: [ ] sore throat  [ ] thrush [ ] ulcers [ ] exudates   swelling and pain left jaw  Respiratory: [ ] dyspnea [ ] hemoptysis [ ] cough [ ] sputum	  Cardiovascular:  [ ] chest pain [ ] palpitations [ ] edema	  Gastrointestinal:  [ ] nausea [ ] vomiting [ ] diarrhea [ ] constipation [ ] pain	  Genitourinary:  [ ] dysuria [ ] frequency [ ] hematuria [ ] discharge [ ] flank pain  [ ] incontinence  Musculoskeletal:  [ ] myalgias [ ] arthralgias [ ] arthritis  [ ] back pain  Neurological:  [ ] headache [ ] seizures  [ ] confusion/altered mental status  Psychiatric:  [ ] anxiety [ ] depression	  Hematology/Lymphatics:  [ ] lymphadenopathy  Endocrine:  [ ] adrenal [ ] thyroid  Allergic/Immunologic:	 [ ] transplant [ ] seasonal    PHYSICAL EXAM:  Constitutional: Non toxic  Eyes: No icterus.  Oral cavity: caries, swelling over left mandible   Neck: Supple  RS: Chest   CVS: S1, S2   Neuro: Alert, oriented to time/place/person  Cranial nerves 2-12 grossly normal. No focal abnormalities    Drug Dosing Weight  Height (cm): 160 (18 Jan 2023 13:22)  Weight (kg): 80.7 (18 Jan 2023 13:22)  BMI (kg/m2): 31.5 (18 Jan 2023 13:22)  BSA (m2): 1.84 (18 Jan 2023 13:22)    Vital Signs Last 24 Hrs  T(F): 98.3 (01-24-23 @ 14:35), Max: 98.6 (01-23-23 @ 17:50)    Vital Signs Last 24 Hrs  HR: 70 (01-24-23 @ 14:35) (70 - 88)  BP: 104/72 (01-24-23 @ 14:35) (104/72 - 126/77)  RR: 18 (01-24-23 @ 14:35)  SpO2: 100% (01-24-23 @ 14:35) (98% - 100%)  Wt(kg): --                          9.6    3.68  )-----------( 250      ( 24 Jan 2023 05:26 )             29.6       01-24    140  |  104  |  14  ----------------------------<  84  4.2   |  27  |  0.85    Ca    8.3<L>      24 Jan 2023 05:26  Phos  5.1     01-24  Mg     1.90     01-24            MICROBIOLOGY:  .Surgical Swab MASTIATOR SPAE CULTURE  01-12-23   Few Streptococcus mitis/oralis group "Susceptibilities not performed"  Few Candida tropicalis "Susceptibilities not performed"  Rare Moni dubliniensis "Susceptibilities not performed"  --  --      .Blood Blood-Peripheral  01-10-23   No Growth Final  --  --      .Blood Blood-Venous  01-10-23   No Growth Final  --  --      .Abscess Left Face  01-03-23   Numerous Streptococcus anginosus  "Susceptibilities not performed"  Numerous Prevotella denticola  "Susceptibilities not performed"  --  --      .Blood Blood  01-02-23   No Growth Final  --  --      .Blood Blood  01-02-23   No Growth Final  --  --      RADIOLOGY:    rad< from: CT Maxillofacial w/ IV Cont (01.20.23 @ 09:52) >  FINDINGS:  Ventricles and sulci:  Normal.  Intra-axial:  No mass, blood or abnormal attenuation is seen.  Extra-axial:  No mass or collection is seen.  Calvarium:  Normal. Some degree of resolution of extracalvarial   collection in the temporal region when compared with the prior study   1/15/2023. Previously seen packing material in the left facial region has   been removed. There is there is now air fluid and some residual swelling   involving the muscles of mastication on the left appreciated. Focal fluid   collection measures 1.7 x 1.8 cm ( APxTR) (7:73) Again decreased swelling   in this region is appreciated without intracranial extension  Facial Bones:  Normal.  Visualized mandible:  Evidence of molar tooth extraction on the left with   associated overlying soft tissue swelling  Remaining visualized bones:  Sinonasal Cavities: Right maxillary mucosal thickening greater than left  Orbital Contents:  Normal.  Miscellaneous:  None.    IMPRESSION:  Status post removal of packing material from the left facial   soft tissues when compared with the prior study 1/15/2023. There is now   air, some fluid and residual soft tissue swelling identified in this   region with focal fluid collection with measurements given above. No   intracranial extension appreciated..    --- End of Report ---        < end of copied text >  < from: CT Head No Cont (01.20.23 @ 09:52) >  IMPRESSION:  Status post removal of packing material from the left facial   soft tissues when compared with the prior study 1/15/2023. There is now   air, some fluid and residual soft tissue swelling identified in this   region with focal fluid collection with measurements given above. No   intracranial extension appreciated..    --- End of Report ---    < end of copied text >  < from: CT Maxillofacial w/ IV Cont (01.15.23 @ 16:52) >  IMPRESSION:    Interval extraction of multiple maxillaryand mandibular teeth, including   a carious left mandibular molar. Interval placement of a catheter in the   left cheek, draining into the oral cavity. Slightly inferior to this   drainage catheter, in the region of a previously seen abscess, there is   soft tissue measuring 2.4 x 2.3 cm which is overall unchanged in size but   increased in density, suggesting phlegmon versus residual abscess (2:57).    Decreased size of abscess along the lateral aspect of the left mandibular   ramus and posterior body (2:64).    Decreased size of abscess in the left temporalis muscle, now measuring   3.8 x 1.1 x 2.8 cm.    --- End of Report ---    < end of copied text >  < from: CT Maxillofacial w/ IV Cont (01.11.23 @ 01:04) >  ACC: [id number] EXAM:  CT MAXILLOFACIAL  IC                          PROCEDURE DATE:  01/11/2023          INTERPRETATION:  CLINICAL INDICATION: Facial abscess.    TECHNIQUE:  Contrast enhanced maxillofacial CT was performed with thin section axial   images.  Sagittal and coronal reformations were created.  Intravenous contrast: 90 cc Omnipaque 350 administered, 10 cc discarded.    COMPARISON: CT maxillofacial 1/2/2023.    FINDINGS:    When compared to CT on 1/2/2023, there has been decrease in size of large   rim-enhancing fluid collection surrounding the left mandibular body and   ramus, likely arising from the left mandibular second molar, which   extends superiorly into the left temporalis muscle, a portion of which is   deep to the zygomatic arch, also slightly smaller in size. However, there   has been increased organization and size of component superficial to the   left masseter muscle. Overall, inferior mandibular component measures up   to 4.7 x 2.2 cm (604:36) and left temporal component measures up to 4.0 x   1.0 cm (4:40). Two tiny locules of gas are demonstrated within this   multispatial abscess within the left temporalis and left masseter   muscles. There is no deep extension. Additional dental caries noted.    There istrace mucosal thickening in the bilateral maxillary sinuses,   frothy secretions within a right posterior ethmoid air cell, the   remaining visualized paranasal sinuses are grossly clear. There are   minimal left mastoid air cell effusions, the rightmastoid air cells and   bilateral tympanomastoid cavities are grossly clear.    Gross caries are seen within the maxilla and mandible. No large   periapical lucencies are seen.    The osseous structures are intact. No lytic or destructive sclerotic   lesions are seen.    The visualized portions of the brain, orbits, and cervical spines are   unremarkable.    IMPRESSION:    Evolving large left transpatial facial abscess, as described, overall   decreased in size when compared with the previous study, although   component superficial to the left masseter muscle has increased in size.        --- End of Report ---      < end of copied text >  < from: CT Maxillofacial w/ IV Cont (01.02.23 @ 23:52) >  IMPRESSION:  Large abscesscontained within the left sided masseter space measuring   4.8 x 2.7 x 11 cm in size. This extends from the mandibular body to the   left temporalis muscle attachment on the skull.  Preliminary report   provided by MARGARETH AGGARWAL M.D.;[facility] RADIOLOGIST.    --- End of Report ---            NAFISA STAPLES MD; Attending Radiologist  This document has been electronically signed. Chaz  3 2023  9:13AM    < end of copied text >

## 2023-01-24 NOTE — CONSULT NOTE ADULT - ASSESSMENT
48 yo woman with h/o thyroid cancer who developed a large left masseter abscess one month ago  CT 1/2/23 reported 4.8 x 2.7 x 11 cm abscess   s/p drain procedure 1/3 - cultures grew strep anginosus and prevotella denticola   CT 1/11 decrease in size of abscess, dental caries  s/p OR drain procedure 1/12 - culture strep mutans, candida tropicalis, candida dublinsis  CT 1/15 decrease size of muscle abscess, myositis  CT 1/20 1.7 x 1.8 cm collection  no intracranial extension     Abscess in left masseter s/p drainage x 2 and antibiotic treatment with slow improvement  Unasyn appropriate antibiotic for organisms isolated 1/3  Drainage 1/12 with oral organism as well, strep mutans. candida probably colonizer after several weeks of antibiotics.    Would continue Unasyn 3 gm iv q 6 h  Will follow

## 2023-01-24 NOTE — PROGRESS NOTE ADULT - PROBLEM SELECTOR PLAN 1
- Please get ID consult for antibiotic recommendation   - Continue trending WBC, and monitor for sign of infection such as surrounding facial erythema and fever  - Continue IV antibiotics  - Please provide humidified face tent for moisturization   - Continue oral Peridex rinses  - Massage to the affected parotid gland q3-4 hours for 15 minutes while awake  - Warm compresses to the affected parotid gland q3-4 hours for 20 minutes while awake  - Sialagogues (lemon or sour candies) q4 hours while awake  - Pain control per primary team  - Aggressive PO hydration, consider IVF if unable to tolerate PO  - If facial swelling is worsen and new surrounding skin erythema develop, please get STAT CT maxillary facial with IV contrast to further evaluate. No need for any image study needed at this time, will continue monitoring clinically  - Case discussed with Dr. Camacho  - Please page [place] at [phone] with any questions

## 2023-01-24 NOTE — PROGRESS NOTE ADULT - SUBJECTIVE AND OBJECTIVE BOX
Patient is a 47y old  Female who presents with a chief complaint of abscess (24 Jan 2023 10:04)    SUBJECTIVE / OVERNIGHT EVENTS: Reports increased facial pain/swelling this morning. Patient seen with ENT PA. Denies fever, chills, nausea, vomiting, visual changes, chest pain, shortness of breath, rash, bleeding.     MEDICATIONS  (STANDING):  ampicillin/sulbactam  IVPB 3 Gram(s) IV Intermittent every 6 hours  chlorhexidine 0.12% Liquid 15 milliLiter(s) Swish and Spit two times a day  enoxaparin Injectable 40 milliGRAM(s) SubCutaneous every 24 hours  famotidine    Tablet 20 milliGRAM(s) Oral daily  gabapentin 200 milliGRAM(s) Oral every 8 hours  influenza   Vaccine 0.5 milliLiter(s) IntraMuscular once  levothyroxine Injectable 90 MICROGram(s) IV Push <User Schedule>  polyethylene glycol 3350 17 Gram(s) Oral daily  QUEtiapine 50 milliGRAM(s) Oral daily  senna 2 Tablet(s) Oral at bedtime  sertraline 50 milliGRAM(s) Oral daily  sodium chloride 0.9%. 1000 milliLiter(s) (100 mL/Hr) IV Continuous <Continuous>    MEDICATIONS  (PRN):  ketorolac   Injectable 15 milliGRAM(s) IV Push every 6 hours PRN Mild Pain (1 - 3)  oxyCODONE    IR 10 milliGRAM(s) Oral every 4 hours PRN Moderate Pain (4 - 6)  oxyCODONE    IR 15 milliGRAM(s) Oral every 6 hours PRN Severe Pain (7 - 10)    CAPILLARY BLOOD GLUCOSE    I&O's Summary    PHYSICAL EXAM:  Vital Signs Last 24 Hrs  T(C): 36.8 (24 Jan 2023 09:26), Max: 37 (23 Jan 2023 17:50)  T(F): 98.2 (24 Jan 2023 09:26), Max: 98.6 (23 Jan 2023 17:50)  HR: 88 (24 Jan 2023 09:26) (79 - 88)  BP: 126/77 (24 Jan 2023 09:26) (118/85 - 133/80)  BP(mean): --  RR: 17 (24 Jan 2023 09:26) (17 - 18)  SpO2: 98% (24 Jan 2023 09:26) (97% - 100%)    Parameters below as of 24 Jan 2023 09:26  Patient On (Oxygen Delivery Method): room air    CONSTITUTIONAL: NAD, well-developed, well-groomed  EYES: conjunctiva and sclera clear  ENMT: Moist oral mucosa, + left facial swelling   RESPIRATORY: Normal respiratory effort; lungs are clear to auscultation bilaterally  CARDIOVASCULAR: Regular rate and rhythm, normal S1 and S2, trace b/l lower extremity edema  ABDOMEN: Nontender to palpation, normoactive bowel sounds, no rebound/guarding  PSYCH: calm; affect appropriate  NEUROLOGY: moving all extremities  SKIN: No rashes    LABS:                        9.6    3.68  )-----------( 250      ( 24 Jan 2023 05:26 )             29.6     01-24    140  |  104  |  14  ----------------------------<  84  4.2   |  27  |  0.85    Ca    8.3<L>      24 Jan 2023 05:26  Phos  5.1     01-24  Mg     1.90     01-24      RADIOLOGY & ADDITIONAL TESTS: Reviewed    COORDINATION OF CARE:  Care Discussed with Consultants/Other Providers [Y- ENT, RN, Medicine ACP]

## 2023-01-25 LAB
ANION GAP SERPL CALC-SCNC: 8 MMOL/L — SIGNIFICANT CHANGE UP (ref 7–14)
BUN SERPL-MCNC: 17 MG/DL — SIGNIFICANT CHANGE UP (ref 7–23)
CALCIUM SERPL-MCNC: 8.4 MG/DL — SIGNIFICANT CHANGE UP (ref 8.4–10.5)
CHLORIDE SERPL-SCNC: 104 MMOL/L — SIGNIFICANT CHANGE UP (ref 98–107)
CO2 SERPL-SCNC: 27 MMOL/L — SIGNIFICANT CHANGE UP (ref 22–31)
CREAT SERPL-MCNC: 0.98 MG/DL — SIGNIFICANT CHANGE UP (ref 0.5–1.3)
EGFR: 71 ML/MIN/1.73M2 — SIGNIFICANT CHANGE UP
GLUCOSE SERPL-MCNC: 87 MG/DL — SIGNIFICANT CHANGE UP (ref 70–99)
HCT VFR BLD CALC: 29.7 % — LOW (ref 34.5–45)
HGB BLD-MCNC: 9.5 G/DL — LOW (ref 11.5–15.5)
MAGNESIUM SERPL-MCNC: 2.1 MG/DL — SIGNIFICANT CHANGE UP (ref 1.6–2.6)
MCHC RBC-ENTMCNC: 32 GM/DL — SIGNIFICANT CHANGE UP (ref 32–36)
MCHC RBC-ENTMCNC: 35.7 PG — HIGH (ref 27–34)
MCV RBC AUTO: 111.7 FL — HIGH (ref 80–100)
NRBC # BLD: 0 /100 WBCS — SIGNIFICANT CHANGE UP (ref 0–0)
NRBC # FLD: 0 K/UL — SIGNIFICANT CHANGE UP (ref 0–0)
PHOSPHATE SERPL-MCNC: 5.1 MG/DL — HIGH (ref 2.5–4.5)
PLATELET # BLD AUTO: 244 K/UL — SIGNIFICANT CHANGE UP (ref 150–400)
POTASSIUM SERPL-MCNC: 4.3 MMOL/L — SIGNIFICANT CHANGE UP (ref 3.5–5.3)
POTASSIUM SERPL-SCNC: 4.3 MMOL/L — SIGNIFICANT CHANGE UP (ref 3.5–5.3)
RBC # BLD: 2.66 M/UL — LOW (ref 3.8–5.2)
RBC # FLD: 12.6 % — SIGNIFICANT CHANGE UP (ref 10.3–14.5)
SODIUM SERPL-SCNC: 139 MMOL/L — SIGNIFICANT CHANGE UP (ref 135–145)
WBC # BLD: 3.9 K/UL — SIGNIFICANT CHANGE UP (ref 3.8–10.5)
WBC # FLD AUTO: 3.9 K/UL — SIGNIFICANT CHANGE UP (ref 3.8–10.5)

## 2023-01-25 PROCEDURE — 99232 SBSQ HOSP IP/OBS MODERATE 35: CPT

## 2023-01-25 RX ORDER — OXYCODONE HYDROCHLORIDE 5 MG/1
5 TABLET ORAL ONCE
Refills: 0 | Status: DISCONTINUED | OUTPATIENT
Start: 2023-01-25 | End: 2023-01-25

## 2023-01-25 RX ADMIN — GABAPENTIN 200 MILLIGRAM(S): 400 CAPSULE ORAL at 05:47

## 2023-01-25 RX ADMIN — FAMOTIDINE 20 MILLIGRAM(S): 10 INJECTION INTRAVENOUS at 11:26

## 2023-01-25 RX ADMIN — OXYCODONE HYDROCHLORIDE 15 MILLIGRAM(S): 5 TABLET ORAL at 06:55

## 2023-01-25 RX ADMIN — GABAPENTIN 200 MILLIGRAM(S): 400 CAPSULE ORAL at 21:35

## 2023-01-25 RX ADMIN — ENOXAPARIN SODIUM 40 MILLIGRAM(S): 100 INJECTION SUBCUTANEOUS at 12:38

## 2023-01-25 RX ADMIN — OXYCODONE HYDROCHLORIDE 15 MILLIGRAM(S): 5 TABLET ORAL at 17:12

## 2023-01-25 RX ADMIN — OXYCODONE HYDROCHLORIDE 15 MILLIGRAM(S): 5 TABLET ORAL at 07:53

## 2023-01-25 RX ADMIN — CHLORHEXIDINE GLUCONATE 15 MILLILITER(S): 213 SOLUTION TOPICAL at 05:48

## 2023-01-25 RX ADMIN — SERTRALINE 50 MILLIGRAM(S): 25 TABLET, FILM COATED ORAL at 21:35

## 2023-01-25 RX ADMIN — AMPICILLIN SODIUM AND SULBACTAM SODIUM 200 GRAM(S): 250; 125 INJECTION, POWDER, FOR SUSPENSION INTRAMUSCULAR; INTRAVENOUS at 05:48

## 2023-01-25 RX ADMIN — AMPICILLIN SODIUM AND SULBACTAM SODIUM 200 GRAM(S): 250; 125 INJECTION, POWDER, FOR SUSPENSION INTRAMUSCULAR; INTRAVENOUS at 12:38

## 2023-01-25 RX ADMIN — CHLORHEXIDINE GLUCONATE 15 MILLILITER(S): 213 SOLUTION TOPICAL at 17:59

## 2023-01-25 RX ADMIN — QUETIAPINE FUMARATE 50 MILLIGRAM(S): 200 TABLET, FILM COATED ORAL at 21:35

## 2023-01-25 RX ADMIN — OXYCODONE HYDROCHLORIDE 15 MILLIGRAM(S): 5 TABLET ORAL at 18:15

## 2023-01-25 RX ADMIN — GABAPENTIN 200 MILLIGRAM(S): 400 CAPSULE ORAL at 14:12

## 2023-01-25 RX ADMIN — Medication 15 MILLIGRAM(S): at 10:58

## 2023-01-25 RX ADMIN — OXYCODONE HYDROCHLORIDE 5 MILLIGRAM(S): 5 TABLET ORAL at 11:26

## 2023-01-25 RX ADMIN — Medication 90 MICROGRAM(S): at 06:50

## 2023-01-25 RX ADMIN — AMPICILLIN SODIUM AND SULBACTAM SODIUM 200 GRAM(S): 250; 125 INJECTION, POWDER, FOR SUSPENSION INTRAMUSCULAR; INTRAVENOUS at 17:59

## 2023-01-25 RX ADMIN — Medication 15 MILLIGRAM(S): at 11:30

## 2023-01-25 NOTE — PROGRESS NOTE ADULT - PROBLEM SELECTOR PLAN 1
ENT, dental and OMFS consulted, /p I&D of abscess and tooth extraction 1/12; CT max facial shows worsening abscess  - previous abscess culture grew strep anginosus and Prevotella denticola  - c/w Unasyn, ID recs appreciated  - s/p OR with ENT on 1/18, procedure note patient with hematoma evacuation  - Abscess culture showing Strep mitis oralis  - BCX NGTD  - c/w Peridex swish and spit BID  - C/w pain control - PRN oxycodone, avoiding IV opioids as able, continue to monitor closely

## 2023-01-25 NOTE — PROGRESS NOTE ADULT - SUBJECTIVE AND OBJECTIVE BOX
Follow Up:  abscess masseter left    Interval History/ROS:   feels swelling a bit better     Allergies  No Known Allergies        ANTIMICROBIALS:  ampicillin/sulbactam  IVPB 3 every 6 hours      OTHER MEDS:  chlorhexidine 0.12% Liquid 15 milliLiter(s) Swish and Spit two times a day  enoxaparin Injectable 40 milliGRAM(s) SubCutaneous every 24 hours  famotidine    Tablet 20 milliGRAM(s) Oral daily  gabapentin 200 milliGRAM(s) Oral every 8 hours  influenza   Vaccine 0.5 milliLiter(s) IntraMuscular once  ketorolac   Injectable 15 milliGRAM(s) IV Push every 6 hours PRN  levothyroxine Injectable 90 MICROGram(s) IV Push <User Schedule>  oxyCODONE    IR 15 milliGRAM(s) Oral every 6 hours PRN  oxyCODONE    IR 10 milliGRAM(s) Oral every 4 hours PRN  polyethylene glycol 3350 17 Gram(s) Oral daily  QUEtiapine 50 milliGRAM(s) Oral daily  senna 2 Tablet(s) Oral at bedtime  sertraline 50 milliGRAM(s) Oral daily  sodium chloride 0.9%. 1000 milliLiter(s) IV Continuous <Continuous>      Vital Signs Last 24 Hrs  T(C): 36.7 (25 Jan 2023 14:14), Max: 37.1 (24 Jan 2023 18:29)  T(F): 98.1 (25 Jan 2023 14:14), Max: 98.7 (24 Jan 2023 18:29)  HR: 83 (25 Jan 2023 14:14) (70 - 87)  BP: 123/69 (25 Jan 2023 14:14) (107/69 - 123/69)  BP(mean): --  RR: 18 (25 Jan 2023 14:14) (18 - 18)  SpO2: 100% (25 Jan 2023 14:14) (98% - 100%)    Parameters below as of 25 Jan 2023 14:14  Patient On (Oxygen Delivery Method): room air        PHYSICAL EXAM:  Constitutional: Not in acute distress  Eyes: No icterus.  Oral cavity: caries  HEENT:  swelling left cheek decreased   RS: Chest clear   CVS: S1, S2   Abdomen: Soft. No guarding/rigidity/tenderness.  Extremities: Warm. No pedal edema  Skin: No lesions noted  Neuro: Alert, oriented to time/place/person  Cranial nerves 2-12 grossly normal. No focal abnormalities                          9.5    3.90  )-----------( 244      ( 25 Jan 2023 06:13 )             29.7       01-25    139  |  104  |  17  ----------------------------<  87  4.3   |  27  |  0.98    Ca    8.4      25 Jan 2023 06:13  Phos  5.1     01-25  Mg     2.10     01-25      MICROBIOLOGY:    .Surgical Swab MASTIATOR SPAE CULTURE  01-12-23   Few Streptococcus mitis/oralis group "Susceptibilities not performed"  Few Candida tropicalis "Susceptibilities not performed"  Rare Moni dubliniensis "Susceptibilities not performed"  --  --      .Blood Blood-Peripheral  01-10-23   No Growth Final  --  --      .Blood Blood-Venous  01-10-23   No Growth Final  --  --      .Abscess Left Face  01-03-23   Numerous Streptococcus anginosus  "Susceptibilities not performed"  Numerous Prevotella denticola  "Susceptibilities not performed"  --  --      .Blood Blood  01-02-23   No Growth Final  --  --      .Blood Blood  01-02-23   No Growth Final  --  --                RADIOLOGY:

## 2023-01-25 NOTE — SBIRT NOTE ADULT - NSSBIRTALCPOSREINDET_GEN_A_CORE
Patient reported she's a social drinker and denies excessive alcohol uses. She's was receptive to receiving information regarding health risk of excessive alcohol use.  provided resources.

## 2023-01-25 NOTE — PROGRESS NOTE ADULT - PROBLEM SELECTOR PLAN 1
- Continue trending WBC, and monitor for sign of infection such as surrounding facial erythema and fever  - Continue unasyn 3gram Q6 hours per ID  - Please provide humidified face tent for moisturization   - Continue oral Peridex rinses  - Massage to the affected parotid gland q3-4 hours for 15 minutes while awake  - Warm compresses to the affected parotid gland q3-4 hours for 20 minutes while awake  - Pain control per primary team  - Aggressive PO hydration, consider IVF if unable to tolerate PO  - If facial swelling is worsen and new surrounding skin erythema develop, please get STAT CT maxillary facial with IV contrast to further evaluate. No need for any image study needed at this time, will continue monitoring clinically  - Case discussed with Dr. Camacho  - [place] [phone] with any questions.

## 2023-01-25 NOTE — PROGRESS NOTE ADULT - ASSESSMENT
48 yo woman with h/o thyroid cancer who developed a large left masseter abscess one month ago  CT 1/2/23 reported 4.8 x 2.7 x 11 cm abscess   s/p drain procedure 1/3 - cultures grew strep anginosus and prevotella denticola   CT 1/11 decrease in size of abscess, dental caries  s/p OR drain procedure 1/12 - culture strep mutans, candida tropicalis, candida dublinsis  CT 1/15 decrease size of muscle abscess, myositis  CT 1/20 1.7 x 1.8 cm collection  no intracranial extension     Abscess in left masseter s/p drainage x 2 and antibiotic treatment with slow improvement  Unasyn appropriate antibiotic for organisms isolated 1/3  Drainage 1/12 with oral organism as well incuding strep mutans.     candida in culture probably colonizer after several weeks of antibiotics.    Would continue Unasyn 3 gm iv q 6 h  Team planning repeat imaging later in week.

## 2023-01-25 NOTE — PROGRESS NOTE ADULT - SUBJECTIVE AND OBJECTIVE BOX
ENT ISSUE/POD: L facial abscess    HPI: Patient seen and examined at bedside this morning. Patient reports she is headache free for the first time in the past 4 days, reports the swelling and pain has improved slightly in compared to yesterday. No acute event overnight. Reports has been using hot pack every hours with massage. Denies fever, chills, SOB, chest pain, and trismus.      PAST MEDICAL & SURGICAL HISTORY:  Thyroid cancer  1996 with ? left partial thyroidectomy with NO post op chemotherapy or RT and in 2011 with total thyroidectomy with NO post op chemotherapy or RT      Neck mass  2022      H/O iron deficiency anemia  followed by hematology. Has received iron infusions. Last infusion in January 2021      Anxiety and depression      History of hoarseness  2022      Lumbar spinal stenosis  and bulging disc      Pain  cystic lesion anterior to the sacrum --- followed by neurologist      H/O thyroidectomy  1996 partial thyroidectomy and in 2011 total thyroidectomy      History of sleeve gastrectomy  has lost 280 pounds      Excessive vaginal bleeding  hyterectomy Jan 2021 -- received 7 units PRBC        Allergies    No Known Allergies    Intolerances      MEDICATIONS  (STANDING):  ampicillin/sulbactam  IVPB 3 Gram(s) IV Intermittent every 6 hours  chlorhexidine 0.12% Liquid 15 milliLiter(s) Swish and Spit two times a day  enoxaparin Injectable 40 milliGRAM(s) SubCutaneous every 24 hours  famotidine    Tablet 20 milliGRAM(s) Oral daily  gabapentin 200 milliGRAM(s) Oral every 8 hours  influenza   Vaccine 0.5 milliLiter(s) IntraMuscular once  levothyroxine Injectable 90 MICROGram(s) IV Push <User Schedule>  polyethylene glycol 3350 17 Gram(s) Oral daily  QUEtiapine 50 milliGRAM(s) Oral daily  senna 2 Tablet(s) Oral at bedtime  sertraline 50 milliGRAM(s) Oral daily  sodium chloride 0.9%. 1000 milliLiter(s) (100 mL/Hr) IV Continuous <Continuous>    MEDICATIONS  (PRN):  ketorolac   Injectable 15 milliGRAM(s) IV Push every 6 hours PRN Mild Pain (1 - 3)  oxyCODONE    IR 10 milliGRAM(s) Oral every 4 hours PRN Moderate Pain (4 - 6)  oxyCODONE    IR 15 milliGRAM(s) Oral every 6 hours PRN Severe Pain (7 - 10)      Social History: see consult note    Family history: see consult note    ROS:   ENT: all negative except as noted in HPI   Pulm: denies SOB, cough, hemoptysis  Neuro: denies numbness/tingling, loss of sensation  Endo: denies heat/cold intolerance, excessive sweating      Vital Signs Last 24 Hrs  T(C): 36.8 (25 Jan 2023 05:45), Max: 37.1 (24 Jan 2023 18:29)  T(F): 98.2 (25 Jan 2023 05:45), Max: 98.7 (24 Jan 2023 18:29)  HR: 70 (25 Jan 2023 05:45) (70 - 88)  BP: 111/68 (25 Jan 2023 05:45) (104/72 - 126/77)  BP(mean): --  RR: 18 (25 Jan 2023 05:45) (17 - 18)  SpO2: 100% (25 Jan 2023 05:45) (98% - 100%)    Parameters below as of 25 Jan 2023 05:45  Patient On (Oxygen Delivery Method): room air                              9.5    3.90  )-----------( 244      ( 25 Jan 2023 06:13 )             29.7    01-25    139  |  104  |  17  ----------------------------<  87  4.3   |  27  |  0.98    Ca    8.4      25 Jan 2023 06:13  Phos  5.1     01-25  Mg     2.10     01-25         PHYSICAL EXAM:  Gen: NAD  Skin: No rashes, bruises, or lesions  Head: Normocephalic, Atraumatic  Face: left facial slightly swollen along the angle of mandible and anterior parotid region, no edema or surrounding skin erythema, no fluctuance. No pus in Stensen's duct with massage this morning.   Eyes: no scleral injection  Ears: No mastoid tenderness, erythema, or ear bulging  Nose: Nares bilaterally patent, no discharge  Mouth: No Stridor / Drooling / Trismus.  Mucosa moist, tongue/uvula midline, oropharynx clear  Neck: Flat, supple, no lymphadenopathy, trachea midline, no masses  Lymphatic: No lymphadenopathy  Resp: breathing easily, no stridor  Neuro: facial nerve intact, no facial droop

## 2023-01-25 NOTE — PROGRESS NOTE ADULT - ASSESSMENT
47y Female with PMH of depression, anxiety, and thyroid cancer s/p left partial thyroidectomy (1996) and central neck dissection (5/22) with recent admission at [facility] for left facial abscess p/w recurrent abscess, failed output PO abx. S/p OR for with ENT for facial abscess. CT head with no extracranial extension.

## 2023-01-25 NOTE — PROGRESS NOTE ADULT - ASSESSMENT
47F with left  space tracking to temporal region s/p I&D 1/2 p/w recurrent L abscess 1/10 s/p I&D and dental extraction 1/12.   RTOR on 1/18 for continued pain and swelling to Left cheek - intraopt no pus expressed but hematoma evacuated. Reports worsening left facial swelling this morning. WBC normal and downtrending, afebrile. Culture shows streptococcus mitis/oralis group, candida tropicalis, and candida dubliniensis. Recent CT done on 1/20 shows focal fluid collection measures 1.7 x 1.8 cm without intracranial extension. ID recommended continue Unasyn 3gram Q6, the candida tropicalis, and candida dubliniensis are most likely due to colonization. WBC normal and patient is afebrile.

## 2023-01-25 NOTE — PROGRESS NOTE ADULT - SUBJECTIVE AND OBJECTIVE BOX
Patient is a 47y old  Female who presents with a chief complaint of abscess (25 Jan 2023 09:12)    SUBJECTIVE / OVERNIGHT EVENTS: No acute events. Reports has been massaging face as per ENT instruction. Migraine/headache has resolved, but still with facial pain. No nausea, vomiting, fever, chills, abdominal pain, constipation, rash.    MEDICATIONS  (STANDING):  ampicillin/sulbactam  IVPB 3 Gram(s) IV Intermittent every 6 hours  chlorhexidine 0.12% Liquid 15 milliLiter(s) Swish and Spit two times a day  enoxaparin Injectable 40 milliGRAM(s) SubCutaneous every 24 hours  famotidine    Tablet 20 milliGRAM(s) Oral daily  gabapentin 200 milliGRAM(s) Oral every 8 hours  influenza   Vaccine 0.5 milliLiter(s) IntraMuscular once  levothyroxine Injectable 90 MICROGram(s) IV Push <User Schedule>  polyethylene glycol 3350 17 Gram(s) Oral daily  QUEtiapine 50 milliGRAM(s) Oral daily  senna 2 Tablet(s) Oral at bedtime  sertraline 50 milliGRAM(s) Oral daily  sodium chloride 0.9%. 1000 milliLiter(s) (100 mL/Hr) IV Continuous <Continuous>    MEDICATIONS  (PRN):  ketorolac   Injectable 15 milliGRAM(s) IV Push every 6 hours PRN Mild Pain (1 - 3)  oxyCODONE    IR 15 milliGRAM(s) Oral every 6 hours PRN Severe Pain (7 - 10)  oxyCODONE    IR 10 milliGRAM(s) Oral every 4 hours PRN Moderate Pain (4 - 6)    CAPILLARY BLOOD GLUCOSE    I&O's Summary    PHYSICAL EXAM:  Vital Signs Last 24 Hrs  T(C): 36.7 (25 Jan 2023 14:14), Max: 37.1 (24 Jan 2023 18:29)  T(F): 98.1 (25 Jan 2023 14:14), Max: 98.7 (24 Jan 2023 18:29)  HR: 83 (25 Jan 2023 14:14) (70 - 87)  BP: 123/69 (25 Jan 2023 14:14) (107/69 - 123/69)  BP(mean): --  RR: 18 (25 Jan 2023 14:14) (18 - 18)  SpO2: 100% (25 Jan 2023 14:14) (98% - 100%)    Parameters below as of 25 Jan 2023 14:14  Patient On (Oxygen Delivery Method): room air    CONSTITUTIONAL: NAD, well-developed, well-groomed  EYES: conjunctiva and sclera clear  ENMT: Moist oral mucosa, + left facial swelling   RESPIRATORY: Normal respiratory effort; lungs are clear to auscultation bilaterally  CARDIOVASCULAR: Regular rate and rhythm, normal S1 and S2, no significant lower extremity edema  ABDOMEN: Nontender to palpation, normoactive bowel sounds, no rebound/guarding  PSYCH: calm; affect appropriate  NEUROLOGY: moving all extremities  SKIN: No rashes      LABS:                        9.5    3.90  )-----------( 244      ( 25 Jan 2023 06:13 )             29.7     01-25    139  |  104  |  17  ----------------------------<  87  4.3   |  27  |  0.98    Ca    8.4      25 Jan 2023 06:13  Phos  5.1     01-25  Mg     2.10     01-25      RADIOLOGY & ADDITIONAL TESTS: Reviewed    COORDINATION OF CARE:  Care Discussed with Consultants/Other Providers [Y- Medicine ACP, RN]

## 2023-01-26 ENCOUNTER — APPOINTMENT (OUTPATIENT)
Dept: ENDOCRINOLOGY | Facility: CLINIC | Age: 48
End: 2023-01-26

## 2023-01-26 LAB
ANION GAP SERPL CALC-SCNC: 9 MMOL/L — SIGNIFICANT CHANGE UP (ref 7–14)
BUN SERPL-MCNC: 16 MG/DL — SIGNIFICANT CHANGE UP (ref 7–23)
CALCIUM SERPL-MCNC: 8.7 MG/DL — SIGNIFICANT CHANGE UP (ref 8.4–10.5)
CHLORIDE SERPL-SCNC: 101 MMOL/L — SIGNIFICANT CHANGE UP (ref 98–107)
CO2 SERPL-SCNC: 28 MMOL/L — SIGNIFICANT CHANGE UP (ref 22–31)
CREAT SERPL-MCNC: 0.87 MG/DL — SIGNIFICANT CHANGE UP (ref 0.5–1.3)
EGFR: 82 ML/MIN/1.73M2 — SIGNIFICANT CHANGE UP
GLUCOSE SERPL-MCNC: 81 MG/DL — SIGNIFICANT CHANGE UP (ref 70–99)
HCT VFR BLD CALC: 33.4 % — LOW (ref 34.5–45)
HGB BLD-MCNC: 10.7 G/DL — LOW (ref 11.5–15.5)
MAGNESIUM SERPL-MCNC: 2 MG/DL — SIGNIFICANT CHANGE UP (ref 1.6–2.6)
MCHC RBC-ENTMCNC: 32 GM/DL — SIGNIFICANT CHANGE UP (ref 32–36)
MCHC RBC-ENTMCNC: 35.3 PG — HIGH (ref 27–34)
MCV RBC AUTO: 110.2 FL — HIGH (ref 80–100)
NRBC # BLD: 0 /100 WBCS — SIGNIFICANT CHANGE UP (ref 0–0)
NRBC # FLD: 0 K/UL — SIGNIFICANT CHANGE UP (ref 0–0)
PHOSPHATE SERPL-MCNC: 5.2 MG/DL — HIGH (ref 2.5–4.5)
PLATELET # BLD AUTO: 312 K/UL — SIGNIFICANT CHANGE UP (ref 150–400)
POTASSIUM SERPL-MCNC: 4.3 MMOL/L — SIGNIFICANT CHANGE UP (ref 3.5–5.3)
POTASSIUM SERPL-SCNC: 4.3 MMOL/L — SIGNIFICANT CHANGE UP (ref 3.5–5.3)
RBC # BLD: 3.03 M/UL — LOW (ref 3.8–5.2)
RBC # FLD: 12.6 % — SIGNIFICANT CHANGE UP (ref 10.3–14.5)
SODIUM SERPL-SCNC: 138 MMOL/L — SIGNIFICANT CHANGE UP (ref 135–145)
WBC # BLD: 4.76 K/UL — SIGNIFICANT CHANGE UP (ref 3.8–10.5)
WBC # FLD AUTO: 4.76 K/UL — SIGNIFICANT CHANGE UP (ref 3.8–10.5)

## 2023-01-26 PROCEDURE — 99221 1ST HOSP IP/OBS SF/LOW 40: CPT

## 2023-01-26 PROCEDURE — 99232 SBSQ HOSP IP/OBS MODERATE 35: CPT

## 2023-01-26 RX ORDER — AMPICILLIN SODIUM AND SULBACTAM SODIUM 250; 125 MG/ML; MG/ML
3 INJECTION, POWDER, FOR SUSPENSION INTRAMUSCULAR; INTRAVENOUS EVERY 6 HOURS
Refills: 0 | Status: DISCONTINUED | OUTPATIENT
Start: 2023-01-26 | End: 2023-01-27

## 2023-01-26 RX ORDER — CHLORHEXIDINE GLUCONATE 213 G/1000ML
15 SOLUTION TOPICAL
Refills: 0 | Status: DISCONTINUED | OUTPATIENT
Start: 2023-01-26 | End: 2023-02-01

## 2023-01-26 RX ORDER — OXYCODONE HYDROCHLORIDE 5 MG/1
5 TABLET ORAL EVERY 8 HOURS
Refills: 0 | Status: DISCONTINUED | OUTPATIENT
Start: 2023-01-26 | End: 2023-01-29

## 2023-01-26 RX ADMIN — OXYCODONE HYDROCHLORIDE 5 MILLIGRAM(S): 5 TABLET ORAL at 07:16

## 2023-01-26 RX ADMIN — ENOXAPARIN SODIUM 40 MILLIGRAM(S): 100 INJECTION SUBCUTANEOUS at 12:37

## 2023-01-26 RX ADMIN — AMPICILLIN SODIUM AND SULBACTAM SODIUM 200 GRAM(S): 250; 125 INJECTION, POWDER, FOR SUSPENSION INTRAMUSCULAR; INTRAVENOUS at 12:33

## 2023-01-26 RX ADMIN — AMPICILLIN SODIUM AND SULBACTAM SODIUM 200 GRAM(S): 250; 125 INJECTION, POWDER, FOR SUSPENSION INTRAMUSCULAR; INTRAVENOUS at 17:36

## 2023-01-26 RX ADMIN — GABAPENTIN 200 MILLIGRAM(S): 400 CAPSULE ORAL at 15:36

## 2023-01-26 RX ADMIN — AMPICILLIN SODIUM AND SULBACTAM SODIUM 200 GRAM(S): 250; 125 INJECTION, POWDER, FOR SUSPENSION INTRAMUSCULAR; INTRAVENOUS at 23:53

## 2023-01-26 RX ADMIN — Medication 90 MICROGRAM(S): at 05:26

## 2023-01-26 RX ADMIN — AMPICILLIN SODIUM AND SULBACTAM SODIUM 200 GRAM(S): 250; 125 INJECTION, POWDER, FOR SUSPENSION INTRAMUSCULAR; INTRAVENOUS at 05:02

## 2023-01-26 RX ADMIN — OXYCODONE HYDROCHLORIDE 15 MILLIGRAM(S): 5 TABLET ORAL at 18:05

## 2023-01-26 RX ADMIN — OXYCODONE HYDROCHLORIDE 10 MILLIGRAM(S): 5 TABLET ORAL at 22:10

## 2023-01-26 RX ADMIN — GABAPENTIN 200 MILLIGRAM(S): 400 CAPSULE ORAL at 21:01

## 2023-01-26 RX ADMIN — OXYCODONE HYDROCHLORIDE 15 MILLIGRAM(S): 5 TABLET ORAL at 10:43

## 2023-01-26 RX ADMIN — GABAPENTIN 200 MILLIGRAM(S): 400 CAPSULE ORAL at 05:03

## 2023-01-26 RX ADMIN — QUETIAPINE FUMARATE 50 MILLIGRAM(S): 200 TABLET, FILM COATED ORAL at 21:02

## 2023-01-26 RX ADMIN — OXYCODONE HYDROCHLORIDE 5 MILLIGRAM(S): 5 TABLET ORAL at 08:15

## 2023-01-26 RX ADMIN — Medication 15 MILLIGRAM(S): at 05:48

## 2023-01-26 RX ADMIN — FAMOTIDINE 20 MILLIGRAM(S): 10 INJECTION INTRAVENOUS at 12:34

## 2023-01-26 RX ADMIN — OXYCODONE HYDROCHLORIDE 10 MILLIGRAM(S): 5 TABLET ORAL at 23:10

## 2023-01-26 RX ADMIN — SERTRALINE 50 MILLIGRAM(S): 25 TABLET, FILM COATED ORAL at 21:02

## 2023-01-26 RX ADMIN — OXYCODONE HYDROCHLORIDE 15 MILLIGRAM(S): 5 TABLET ORAL at 01:25

## 2023-01-26 RX ADMIN — Medication 15 MILLIGRAM(S): at 04:48

## 2023-01-26 RX ADMIN — CHLORHEXIDINE GLUCONATE 15 MILLILITER(S): 213 SOLUTION TOPICAL at 05:03

## 2023-01-26 RX ADMIN — CHLORHEXIDINE GLUCONATE 15 MILLILITER(S): 213 SOLUTION TOPICAL at 17:36

## 2023-01-26 RX ADMIN — AMPICILLIN SODIUM AND SULBACTAM SODIUM 200 GRAM(S): 250; 125 INJECTION, POWDER, FOR SUSPENSION INTRAMUSCULAR; INTRAVENOUS at 00:27

## 2023-01-26 RX ADMIN — OXYCODONE HYDROCHLORIDE 15 MILLIGRAM(S): 5 TABLET ORAL at 17:35

## 2023-01-26 RX ADMIN — OXYCODONE HYDROCHLORIDE 15 MILLIGRAM(S): 5 TABLET ORAL at 11:13

## 2023-01-26 RX ADMIN — OXYCODONE HYDROCHLORIDE 15 MILLIGRAM(S): 5 TABLET ORAL at 00:27

## 2023-01-26 NOTE — CHART NOTE - NSCHARTNOTESELECT_GEN_ALL_CORE
ACP/Event Note
ENT/Event Note
Endocrinology/Event Note
Event Note
Follow-Up/Nutrition Services
OMFS POC/Event Note
ACP/Event Note
ACP/Event Note
Event Note

## 2023-01-26 NOTE — HISTORY OF PRESENT ILLNESS
[FreeTextEntry1] : Patient is a 47 F with history of papillary thyroid CA s/p L thyroidectomy in 1990s followed by completion of thyroidectomy in 2010 as well as central neck dissection in May 2022 here to establish care for post hypothyroidism. \par \par FH\par SH\par Menstrual history \par \par # Postop hypothyroidism \par Patient with history of Papillary Thyroid CA s/p L. thyroidectomy in 1990s, followed by completion thyroidectomy in 2010 as well central neck dissection in May 2022. Pathology from central neck mass\par excision in May 2022 revealed thyroid tissue with adenomatoid nodules and no evidence of recurrent thyroid cancer per review of path report & outpt surg onc documentation.\par \par Recent TFT 01/12/202 \par TSH 36.67\par FT4 <0.3 low \par TT3 44 low\par \par Patient started in LT4 90 mcg IV while in the hospital and then transitioned to LT4 125 mcg PO at discharge.

## 2023-01-26 NOTE — PROGRESS NOTE ADULT - SUBJECTIVE AND OBJECTIVE BOX
ENT ISSUE/POD: L Facial abscess    HPI: Patient seen and examined at bedside. Reports waking up from middle of the night due to discomfort and pain on her left cheek, felt the lump is firmer than yesterday. Denies drainage, trismus, fever, chills, and SOB.        PAST MEDICAL & SURGICAL HISTORY:  Thyroid cancer  1996 with ? left partial thyroidectomy with NO post op chemotherapy or RT and in 2011 with total thyroidectomy with NO post op chemotherapy or RT      Neck mass  2022      H/O iron deficiency anemia  followed by hematology. Has received iron infusions. Last infusion in January 2021      Anxiety and depression      History of hoarseness  2022      Lumbar spinal stenosis  and bulging disc      Pain  cystic lesion anterior to the sacrum --- followed by neurologist      H/O thyroidectomy  1996 partial thyroidectomy and in 2011 total thyroidectomy      History of sleeve gastrectomy  has lost 280 pounds      Excessive vaginal bleeding  hyterectomy Jan 2021 -- received 7 units PRBC        Allergies    No Known Allergies    Intolerances      MEDICATIONS  (STANDING):  ampicillin/sulbactam  IVPB 3 Gram(s) IV Intermittent every 6 hours  chlorhexidine 0.12% Liquid 15 milliLiter(s) Swish and Spit two times a day  enoxaparin Injectable 40 milliGRAM(s) SubCutaneous every 24 hours  famotidine    Tablet 20 milliGRAM(s) Oral daily  gabapentin 200 milliGRAM(s) Oral every 8 hours  influenza   Vaccine 0.5 milliLiter(s) IntraMuscular once  levothyroxine Injectable 90 MICROGram(s) IV Push <User Schedule>  polyethylene glycol 3350 17 Gram(s) Oral daily  QUEtiapine 50 milliGRAM(s) Oral daily  senna 2 Tablet(s) Oral at bedtime  sertraline 50 milliGRAM(s) Oral daily  sodium chloride 0.9%. 1000 milliLiter(s) (100 mL/Hr) IV Continuous <Continuous>    MEDICATIONS  (PRN):  ketorolac   Injectable 15 milliGRAM(s) IV Push every 6 hours PRN Mild Pain (1 - 3)  oxyCODONE    IR 10 milliGRAM(s) Oral every 4 hours PRN Moderate Pain (4 - 6)  oxyCODONE    IR 15 milliGRAM(s) Oral every 6 hours PRN Severe Pain (7 - 10)  oxyCODONE    IR 5 milliGRAM(s) Oral every 8 hours PRN Mild Pain (1 - 3)      Social History: see consult note    Family history: see consult note    ROS:   ENT: all negative except as noted in HPI   Pulm: denies SOB, cough, hemoptysis  Neuro: denies numbness/tingling, loss of sensation  Endo: denies heat/cold intolerance, excessive sweating      Vital Signs Last 24 Hrs  T(C): 36.7 (26 Jan 2023 05:48), Max: 36.8 (25 Jan 2023 09:56)  T(F): 98 (26 Jan 2023 05:48), Max: 98.3 (25 Jan 2023 09:56)  HR: 89 (26 Jan 2023 05:48) (83 - 89)  BP: 104/84 (26 Jan 2023 05:48) (104/84 - 127/79)  BP(mean): --  RR: 16 (26 Jan 2023 05:48) (16 - 18)  SpO2: 98% (26 Jan 2023 05:48) (98% - 100%)    Parameters below as of 26 Jan 2023 05:48  Patient On (Oxygen Delivery Method): room air                              10.7   4.76  )-----------( 312      ( 26 Jan 2023 05:55 )             33.4    01-26    138  |  101  |  16  ----------------------------<  81  4.3   |  28  |  0.87    Ca    8.7      26 Jan 2023 05:55  Phos  5.2     01-26  Mg     2.00     01-26         PHYSICAL EXAM:  Gen: NAD  Skin: No rashes, bruises, or lesions  Head: Normocephalic, Atraumatic  Face: L firm lump on the cheek with one area of erythematous, likely from pressing. No edema, erythema, or fluctuance. Parotid glands soft without mass  Eyes: no scleral injection  Ears: no mastoid tenderness, erythema, or ear bulging  Nose: Nares bilaterally patent, no discharge  Mouth: No Stridor / Drooling / Trismus.  Mucosa moist, tongue/uvula midline, oropharynx clear  Neck: Flat, supple, no lymphadenopathy, trachea midline, no masses  Lymphatic: No lymphadenopathy  Resp: breathing easily, no stridor  Neuro: facial nerve intact, no facial droop

## 2023-01-26 NOTE — PROGRESS NOTE ADULT - ASSESSMENT
47y Female with PMH of depression, anxiety, and thyroid cancer s/p left partial thyroidectomy (1996) and central neck dissection (5/22) with recent admission at [facility] for left facial abscess p/w recurrent abscess, failed output PO abx. S/p OR for with ENT for facial abscess. CT head with no extracranial extension.

## 2023-01-26 NOTE — PROGRESS NOTE ADULT - ASSESSMENT
47F with left  space tracking to temporal region s/p I&D 1/2 p/w recurrent L abscess 1/10 s/p I&D and dental extraction 1/12.   RTOR on 1/18 for continued pain and swelling to Left cheek - intraopt no pus expressed but hematoma evacuated. Reports worsening left facial swelling this morning. Recent CT done on 1/20 shows focal fluid collection measures 1.7 x 1.8 cm without intracranial extension. ID recommended continue Unasyn 3gram Q6, the candida tropicalis, and candida dubliniensis are most likely due to colonization. WBC normal and patient is afebrile. The left cheek swelling is about the same, but feels more firmer to palpation.

## 2023-01-26 NOTE — PROGRESS NOTE ADULT - PROBLEM SELECTOR PLAN 6
Encourage ambulation; IMPROVE score 0  Dispo: pain control. Encourage ambulation; IMPROVE score 0  Dispo: pain control.    #Lightheadedness: Transient, self resolved, no syncope or LOC. Check orthostatics.

## 2023-01-26 NOTE — PROGRESS NOTE ADULT - SUBJECTIVE AND OBJECTIVE BOX
Patient is a 47y old  Female who presents with a chief complaint of abscess (26 Jan 2023 08:58)    SUBJECTIVE / OVERNIGHT EVENTS: Migraine/headache resolved, but with continued left facial pain, per patient, not significantly improved. No fevers, chills, nausea, vomiting, abdominal pain. Experienced some lightheadedness yesterday which has since resolved -pending orthostatics.     MEDICATIONS  (STANDING):  ampicillin/sulbactam  IVPB 3 Gram(s) IV Intermittent every 6 hours  chlorhexidine 0.12% Liquid 15 milliLiter(s) Swish and Spit two times a day  enoxaparin Injectable 40 milliGRAM(s) SubCutaneous every 24 hours  famotidine    Tablet 20 milliGRAM(s) Oral daily  gabapentin 200 milliGRAM(s) Oral every 8 hours  influenza   Vaccine 0.5 milliLiter(s) IntraMuscular once  levothyroxine Injectable 90 MICROGram(s) IV Push <User Schedule>  polyethylene glycol 3350 17 Gram(s) Oral daily  QUEtiapine 50 milliGRAM(s) Oral daily  senna 2 Tablet(s) Oral at bedtime  sertraline 50 milliGRAM(s) Oral daily  sodium chloride 0.9%. 1000 milliLiter(s) (100 mL/Hr) IV Continuous <Continuous>    MEDICATIONS  (PRN):  ketorolac   Injectable 15 milliGRAM(s) IV Push every 6 hours PRN Mild Pain (1 - 3)  oxyCODONE    IR 5 milliGRAM(s) Oral every 8 hours PRN Mild Pain (1 - 3)  oxyCODONE    IR 10 milliGRAM(s) Oral every 4 hours PRN Moderate Pain (4 - 6)  oxyCODONE    IR 15 milliGRAM(s) Oral every 6 hours PRN Severe Pain (7 - 10)    CAPILLARY BLOOD GLUCOSE    I&O's Summary    PHYSICAL EXAM:  Vital Signs Last 24 Hrs  T(C): 36.5 (26 Jan 2023 09:52), Max: 36.8 (25 Jan 2023 21:24)  T(F): 97.7 (26 Jan 2023 09:52), Max: 98.3 (25 Jan 2023 21:24)  HR: 77 (26 Jan 2023 09:52) (77 - 89)  BP: 110/65 (26 Jan 2023 09:52) (104/84 - 127/79)  BP(mean): --  RR: 18 (26 Jan 2023 09:52) (16 - 18)  SpO2: 98% (26 Jan 2023 09:52) (98% - 100%)    Parameters below as of 26 Jan 2023 09:52  Patient On (Oxygen Delivery Method): room air    CONSTITUTIONAL: NAD, well-developed, well-groomed  EYES: conjunctiva and sclera clear  ENMT: Moist oral mucosa, + left facial swelling   RESPIRATORY: Normal respiratory effort; lungs are clear to auscultation bilaterally  CARDIOVASCULAR: Regular rate and rhythm, normal S1 and S2, trace bilateral lower extremity edema  ABDOMEN: Nontender to palpation, normoactive bowel sounds, no rebound/guarding  PSYCH: calm; affect appropriate  NEUROLOGY: moving all extremities  SKIN: No rashes    LABS:                        10.7   4.76  )-----------( 312      ( 26 Jan 2023 05:55 )             33.4     01-26    138  |  101  |  16  ----------------------------<  81  4.3   |  28  |  0.87    Ca    8.7      26 Jan 2023 05:55  Phos  5.2     01-26  Mg     2.00     01-26    RADIOLOGY & ADDITIONAL TESTS: Reviewed    COORDINATION OF CARE:  Care Discussed with Consultants/Other Providers [Y- Medicine ACP, ENT, RN]

## 2023-01-26 NOTE — CHART NOTE - NSCHARTNOTEFT_GEN_A_CORE
Reason for Follow-Up Assessment: Length of Stay     47F with left  space tracking to temporal region s/p I&D 1/2 p/w recurrent L abscess 1/10 s/p I&D and dental extraction 1/12.   RTOR on 1/18 for continued pain and swelling to Left cheek - intraopt no pus expressed but hematoma evacuated. Reports worsening left facial swelling this morning. Recent CT done on 1/20 shows focal fluid collection measures 1.7 x 1.8 cm without intracranial extension. ID recommended continue Unasyn 3gram Q6, the candida tropicalis, and candida dubliniensis are most likely due to colonization. WBC normal and patient is afebrile.     Source: [ X ] Patient [ ] Family [ ] RN [ X ] Chart      Diet, Regular (01-14-23 @ 08:31)    GI: WDL. per nursing flowsheets, passing flatus    PO intake:  [ ] Poor < 50%  [ ] Fair 50-75% [X ] Good  % [ ] Inconsistent PO intake    Enteral /Parenteral Nutrition:       [ X ] n/a    Anthropometrics: Height (cm): 160 (01-18)  Weight (kg): 80.7 (01-18), 79.8 (01-02)  BMI (kg/m2): 31.5 (01-18)    Questionable inaccurate weights recorded 1/16 - 107.1kg, 1/15 - 106kg    Adm/dosing weight 80.3kg     Edema: No edema per nursing flowsheets.     Pressure Injuries: No pressure ulcers/DTI noted in flowsheets.     _______________ Pertinent Medications_______________  MEDICATIONS  (STANDING):  ampicillin/sulbactam  IVPB 3 Gram(s) IV Intermittent every 6 hours  chlorhexidine 0.12% Liquid 15 milliLiter(s) Swish and Spit two times a day  enoxaparin Injectable 40 milliGRAM(s) SubCutaneous every 24 hours  famotidine    Tablet 20 milliGRAM(s) Oral daily  gabapentin 200 milliGRAM(s) Oral every 8 hours  influenza   Vaccine 0.5 milliLiter(s) IntraMuscular once  levothyroxine Injectable 90 MICROGram(s) IV Push <User Schedule>  polyethylene glycol 3350 17 Gram(s) Oral daily  QUEtiapine 50 milliGRAM(s) Oral daily  senna 2 Tablet(s) Oral at bedtime  sertraline 50 milliGRAM(s) Oral daily  sodium chloride 0.9%. 1000 milliLiter(s) (100 mL/Hr) IV Continuous <Continuous>    MEDICATIONS  (PRN):  ketorolac   Injectable 15 milliGRAM(s) IV Push every 6 hours PRN Mild Pain (1 - 3)  oxyCODONE    IR 10 milliGRAM(s) Oral every 4 hours PRN Moderate Pain (4 - 6)  oxyCODONE    IR 15 milliGRAM(s) Oral every 6 hours PRN Severe Pain (7 - 10)  oxyCODONE    IR 5 milliGRAM(s) Oral every 8 hours PRN Mild Pain (1 - 3)      __________________ Pertinent Labs__________________   01-26 Na138 mmol/L Glu 81 mg/dL K+ 4.3 mmol/L Cr  0.87 mg/dL BUN 16 mg/dL 01-26 Phos 5.2 mg/dL<H>      Estimated Needs:   [X] no change since previous assessment  [ ] recalculated:     Previous Nutrition Diagnosis: No nutrition diagnosis identified at time of initial assessment 1/19/23    Nutrition Diagnosis is [ ] ongoing  [ ] resolved [ ] not applicable     New Nutrition Diagnosis:     Monitoring and Evaluation:     [ x ] Tolerance to diet prescription / adequacy of meal intake  [ x ] Weight trends   [ x ] Pertinent labs    Recommendations:  1) Diet / Supplement Changes: Continue least restrictive diet as tolerated.  2) Obtain and record current weights to best monitor for acute changes in nutritional status.  3) Please consistently document % meal intake in nursing flowsheets.   4) Encourage sufficient hydration.  5) Honor food preferences as requested and available.    Sonja Martines, MS, RDN, CDN  Pager [phone]  Also available on MS Teams Reason for Follow-Up Assessment: Length of Stay     47F with left  space tracking to temporal region s/p I&D 1/2 p/w recurrent L abscess 1/10 s/p I&D and dental extraction 1/12.   RTOR on 1/18 for continued pain and swelling to Left cheek - intraopt no pus expressed but hematoma evacuated. Reports worsening left facial swelling this morning. Recent CT done on 1/20 shows focal fluid collection. ID recommended continue Unasyn, the candida tropicalis, and candida dubliniensis are most likely due to colonization. WBC normal and patient is afebrile.     Patient endorses good appetite at this time, completing meals without issue.  No chewing or swallowing difficulties reported. No GI distress reported i.e. nausea, vomiting, diarrhea. Otherwise, no nutritional concerns.  Offered oral supplement due to c/o mouth pain, however, declines at this time.    Source: [ X ] Patient [ ] Family [ ] RN [ X ] Chart      Diet, Regular (01-14-23 @ 08:31)    GI: WDL. per nursing flowsheets, passing flatus    PO intake:  [ ] Poor < 50%  [ ] Fair 50-75% [X ] Good  % [ ] Inconsistent PO intake    Enteral /Parenteral Nutrition:       [ X ] n/a    Anthropometrics: Height (cm): 160 (01-18)  Weight (kg): 80.7 (01-18), 79.8 (01-02)  BMI (kg/m2): 31.5 (01-18)    Questionable inaccurate weights recorded 1/16 - 107.1kg, 1/15 - 106kg, no new weights to assess    Adm/dosing weight 80.3kg     Edema: No edema per nursing flowsheets.     Pressure Injuries: No pressure ulcers/DTI noted in flowsheets.     _______________ Pertinent Medications_______________  MEDICATIONS  (STANDING):  ampicillin/sulbactam  IVPB 3 Gram(s) IV Intermittent every 6 hours  chlorhexidine 0.12% Liquid 15 milliLiter(s) Swish and Spit two times a day  enoxaparin Injectable 40 milliGRAM(s) SubCutaneous every 24 hours  famotidine    Tablet 20 milliGRAM(s) Oral daily  gabapentin 200 milliGRAM(s) Oral every 8 hours  influenza   Vaccine 0.5 milliLiter(s) IntraMuscular once  levothyroxine Injectable 90 MICROGram(s) IV Push <User Schedule>  polyethylene glycol 3350 17 Gram(s) Oral daily  QUEtiapine 50 milliGRAM(s) Oral daily  senna 2 Tablet(s) Oral at bedtime  sertraline 50 milliGRAM(s) Oral daily  sodium chloride 0.9%. 1000 milliLiter(s) (100 mL/Hr) IV Continuous <Continuous>    MEDICATIONS  (PRN):  ketorolac   Injectable 15 milliGRAM(s) IV Push every 6 hours PRN Mild Pain (1 - 3)  oxyCODONE    IR 10 milliGRAM(s) Oral every 4 hours PRN Moderate Pain (4 - 6)  oxyCODONE    IR 15 milliGRAM(s) Oral every 6 hours PRN Severe Pain (7 - 10)  oxyCODONE    IR 5 milliGRAM(s) Oral every 8 hours PRN Mild Pain (1 - 3)      __________________ Pertinent Labs__________________   01-26 Na138 mmol/L Glu 81 mg/dL K+ 4.3 mmol/L Cr  0.87 mg/dL BUN 16 mg/dL 01-26 Phos 5.2 mg/dL<H>    Estimated Needs:   [X] no change since previous assessment  [ ] recalculated:     Previous Nutrition Diagnosis: No nutrition diagnosis identified at time of initial assessment 1/19/23    Nutrition Diagnosis is [ ] ongoing  [ ] resolved [ ] not applicable     New Nutrition Diagnosis:     Monitoring and Evaluation:     [ x ] Tolerance to diet prescription / adequacy of meal intake  [ x ] Weight trends   [ x ] Pertinent labs    Recommendations:  1) Diet / Supplement Changes: Continue least restrictive diet as tolerated.  2) Obtain and record current weights to best monitor for acute changes in nutritional status.  3) Please consistently document % meal intake in nursing flowsheets.   4) Encourage sufficient hydration.  5) Honor food preferences as requested and available.    Sonja Martines, MS, RDN, CDN  Pager [phone]  Also available on MS Teams

## 2023-01-26 NOTE — PROGRESS NOTE ADULT - ASSESSMENT
48 yo woman with h/o thyroid cancer who developed a large left masseter abscess one month ago  CT 1/2/23 reported 4.8 x 2.7 x 11 cm abscess   s/p drain procedure 1/3 at [facility] - cultures grew strep anginosus and prevotella denticola   CT 1/11 decrease in size of abscess, dental caries  s/p OR for 4 teeth extraction and drain procedure 1/12 - culture strep mutans, candida tropicalis, candida dublinsis  CT 1/15 decrease size of muscle abscess, myositis  CT 1/20 1.7 x 1.8 cm collection  no intracranial extension     Abscess in left masseter s/p drainage x 2 and antibiotic treatment with slow improvement  Unasyn appropriate antibiotic for organisms isolated 1/3  Drainage 1/12 with oral organism as well -strep mutans.     candida in culture probably colonizer after several weeks of antibiotics.    Would continue Unasyn 3 gm iv q 6 h while inhouse  If d/c in next few days can transition to augmentin 875 mg po q 12 x 2 weeks with f/u ENT and ID  given my contact info.  Evaluate pain medications    I will be away--> 2/7  ID service available for questions.

## 2023-01-26 NOTE — PROGRESS NOTE ADULT - PROBLEM SELECTOR PLAN 1
- CT maxillary facial with IV contrast to further evaluate.  - Continue trending WBC, and monitor for sign of infection such as surrounding facial erythema and fever  - Continue unasyn 3gram Q6 hours per ID  - Please provide humidified face tent for moisturization   - Continue oral Peridex rinses  - Massage to the affected parotid gland q3-4 hours for 15 minutes while awake  - Warm compresses to the affected parotid gland q3-4 hours for 20 minutes while awake  - Pain control per primary team  - Aggressive PO hydration, consider IVF if unable to tolerate PO  - Case discussed with Dr. Camacho  - [place] [phone] with any questions. - No CT maxillary facial with IV contrast needed at this time as the filminess most likely due to inflammation and edema which may take time to resolve  - Continue trending WBC, and monitor for sign of infection such as surrounding facial erythema and fever  - Continue unasyn 3gram Q6 hours per ID, consider transition to PO antibiotics  - Please provide humidified face tent for moisturization   - Continue oral Peridex rinses  - Warm compresses to the affected parotid gland q3-4 hours for 20 minutes while awake  - Pain control per primary team  - Aggressive PO hydration, consider IVF if unable to tolerate PO  - Case discussed with Dr. Camacho  - [place] [phone] with any questions. - Would recommend against CT scan at this time as the filminess most likely due to inflammation/edema and residual hematoma which may take time to resolve  - Continue trending WBC, and monitor for sign of infection such as surrounding facial erythema and fever  - Please discuss with ID transitioning to PO abx and time course of abx. Patient is likely able to follow up outpatient at this time  - Please provide humidified face tent for moisturization   - Continue oral Peridex rinses  - Warm compresses to the affected parotid gland q3-4 hours for 20 minutes while awake  - Pain control per primary team  - Aggressive PO hydration, consider IVF if unable to tolerate PO  - Case discussed with Dr. Camacho  - [place] at [phone] with any questions. - Would recommend against CT scan at this time as the filminess most likely due to inflammation/edema and residual hematoma which may take time to resolve  - Continue trending WBC, and monitor for sign of infection such as surrounding facial erythema and fever  - Please discuss with ID transitioning to PO abx and time course of abx. Patient is likely able to follow up outpatient at this time  - Please provide humidified face tent for moisturization   - Continue oral Peridex rinses  - Warm compresses to the affected parotid gland q3-4 hours for 20 minutes while awake  - Pain control per primary team  - Aggressive PO hydration, consider IVF if unable to tolerate PO  - Dispo per primary team  - Case discussed with Dr. Camacho  - [place] at [phone] with any questions. - Would recommend against CT scan at this time as the filminess most likely due to inflammation/edema and residual hematoma which may take time to resolve  - Continue trending WBC, and monitor for sign of infection such as surrounding facial erythema and fever  - Please discuss with ID transitioning to PO abx and time course of abx. Patient is likely able to follow up outpatient at this time  - Please provide humidified face tent for moisturization   - Continue oral Peridex rinses  - Warm compresses to the affected parotid gland q3-4 hours for 20 minutes while awake  - Pain control per primary team  - Aggressive PO hydration, consider IVF if unable to tolerate PO  - Dispo per primary team  - Case discussed with Dr. Camacho  - Please follow up with Dr. Camacho in a week after discharge, please call [phone] for an appointment time  - Please page ENT at [phone] with any questions.

## 2023-01-26 NOTE — PROGRESS NOTE ADULT - PROBLEM SELECTOR PLAN 1
ENT, dental and OMFS consulted, /p I&D of abscess and tooth extraction 1/12; CT max facial shows worsening abscess  - previous abscess culture grew strep anginosus and Prevotella denticola  - c/w Unasyn, ID recs appreciated  - s/p OR with ENT on 1/18, procedure note patient with hematoma evacuation  - Abscess culture showing Strep mitis oralis  - BCX NGTD  - c/w Peridex swish and spit BID  - C/w pain control - PRN oxycodone, avoiding IV opioids as able, continue to monitor closely. Patient reporting continued facial pain, ENT informed for further evaluation

## 2023-01-26 NOTE — PROGRESS NOTE ADULT - SUBJECTIVE AND OBJECTIVE BOX
Follow Up:  abscess masseter left    Interval History/ROS:   facial swelling better but still having pain      Allergies  No Known Allergies        ANTIMICROBIALS: ampicillin/sulbactam  IVPB 3 every 6 hours    MEDICATIONS  (STANDING):  enoxaparin Injectable 40 every 24 hours  famotidine    Tablet 20 daily  gabapentin 200 every 8 hours  influenza   Vaccine 0.5 once  ketorolac   Injectable 15 every 6 hours PRN  levothyroxine Injectable 90 <User Schedule>  oxyCODONE    IR 5 every 8 hours PRN  oxyCODONE    IR 10 every 4 hours PRN  oxyCODONE    IR 15 every 6 hours PRN  polyethylene glycol 3350 17 daily  QUEtiapine 50 daily  senna 2 at bedtime  sertraline 50 daily    Vital Signs Last 24 Hrs  T(F): 97.7 (01-26-23 @ 14:00), Max: 98.3 (01-25-23 @ 21:24)  HR: 76 (01-26-23 @ 14:00)  BP: 112/- (01-26-23 @ 14:00)  RR: 17 (01-26-23 @ 14:00)  SpO2: 98% (01-26-23 @ 14:00) (98% - 100%)        PHYSICAL EXAM:  Constitutional: tearful   Eyes: No icterus.  Oral cavity: caries  HEENT:  swelling left cheek firm decreased, no increased warmth   RS: Chest clear   CVS: S1, S2   Abdomen: Soft. No guarding/rigidity/tenderness.  Neuro: Alert, oriented to time/place/person  Cranial nerves 2-12 grossly normal. No focal abnormalities                                     10.7   4.76  )-----------( 312      ( 26 Jan 2023 05:55 )             33.4 01-26    138  |  101  |  16  ----------------------------<  81  4.3   |  28  |  0.87  Ca    8.7      26 Jan 2023 05:55Phos  5.2     01-26Mg     2.00     01-26      MICROBIOLOGY:    .Surgical Swab MASTIATOR SPAE CULTURE  01-12-23   Few Streptococcus mitis/oralis group "Susceptibilities not performed"  Few Candida tropicalis "Susceptibilities not performed"  Rare Moni dubliniensis "Susceptibilities not performed"  --  --      .Blood Blood-Peripheral  01-10-23   No Growth Final  --  --      .Blood Blood-Venous  01-10-23   No Growth Final  --  --      .Abscess Left Face  01-03-23   Numerous Streptococcus anginosus  "Susceptibilities not performed"  Numerous Prevotella denticola  "Susceptibilities not performed"  --  --      .Blood Blood  01-02-23   No Growth Final  --  --      .Blood Blood  01-02-23   No Growth Final  --  --          RADIOLOGY:    ra< from: CT Maxillofacial w/ IV Cont (01.20.23 @ 09:52) >  IMPRESSION:  Status post removal of packing material from the left facial   soft tissues when compared with the prior study 1/15/2023. There is now   air, some fluid and residual soft tissue swelling identified in this   region with focal fluid collection with measurements given above. No   intracranial extension appreciated..    --- End of Report ---            ALLEN PARK MD; Attending Radiologist  This document has been electronically signed. Jan 20 2023  1:48PM    < end of copied text >

## 2023-01-27 LAB
ANION GAP SERPL CALC-SCNC: 6 MMOL/L — LOW (ref 7–14)
BASOPHILS # BLD AUTO: 0.06 K/UL — SIGNIFICANT CHANGE UP (ref 0–0.2)
BASOPHILS NFR BLD AUTO: 1.5 % — SIGNIFICANT CHANGE UP (ref 0–2)
BUN SERPL-MCNC: 19 MG/DL — SIGNIFICANT CHANGE UP (ref 7–23)
CALCIUM SERPL-MCNC: 8.7 MG/DL — SIGNIFICANT CHANGE UP (ref 8.4–10.5)
CHLORIDE SERPL-SCNC: 106 MMOL/L — SIGNIFICANT CHANGE UP (ref 98–107)
CO2 SERPL-SCNC: 28 MMOL/L — SIGNIFICANT CHANGE UP (ref 22–31)
CREAT SERPL-MCNC: 1.01 MG/DL — SIGNIFICANT CHANGE UP (ref 0.5–1.3)
EGFR: 69 ML/MIN/1.73M2 — SIGNIFICANT CHANGE UP
EOSINOPHIL # BLD AUTO: 0.41 K/UL — SIGNIFICANT CHANGE UP (ref 0–0.5)
EOSINOPHIL NFR BLD AUTO: 10 % — HIGH (ref 0–6)
GLUCOSE SERPL-MCNC: 83 MG/DL — SIGNIFICANT CHANGE UP (ref 70–99)
HCT VFR BLD CALC: 30.2 % — LOW (ref 34.5–45)
HGB BLD-MCNC: 9.5 G/DL — LOW (ref 11.5–15.5)
IANC: 2 K/UL — SIGNIFICANT CHANGE UP (ref 1.8–7.4)
IMM GRANULOCYTES NFR BLD AUTO: 0.2 % — SIGNIFICANT CHANGE UP (ref 0–0.9)
LYMPHOCYTES # BLD AUTO: 1.2 K/UL — SIGNIFICANT CHANGE UP (ref 1–3.3)
LYMPHOCYTES # BLD AUTO: 29.4 % — SIGNIFICANT CHANGE UP (ref 13–44)
MAGNESIUM SERPL-MCNC: 2 MG/DL — SIGNIFICANT CHANGE UP (ref 1.6–2.6)
MCHC RBC-ENTMCNC: 31.5 GM/DL — LOW (ref 32–36)
MCHC RBC-ENTMCNC: 34.9 PG — HIGH (ref 27–34)
MCV RBC AUTO: 111 FL — HIGH (ref 80–100)
MONOCYTES # BLD AUTO: 0.4 K/UL — SIGNIFICANT CHANGE UP (ref 0–0.9)
MONOCYTES NFR BLD AUTO: 9.8 % — SIGNIFICANT CHANGE UP (ref 2–14)
NEUTROPHILS # BLD AUTO: 2 K/UL — SIGNIFICANT CHANGE UP (ref 1.8–7.4)
NEUTROPHILS NFR BLD AUTO: 49.1 % — SIGNIFICANT CHANGE UP (ref 43–77)
NRBC # BLD: 0 /100 WBCS — SIGNIFICANT CHANGE UP (ref 0–0)
NRBC # FLD: 0 K/UL — SIGNIFICANT CHANGE UP (ref 0–0)
PHOSPHATE SERPL-MCNC: 4.9 MG/DL — HIGH (ref 2.5–4.5)
PLATELET # BLD AUTO: 271 K/UL — SIGNIFICANT CHANGE UP (ref 150–400)
POTASSIUM SERPL-MCNC: 4.2 MMOL/L — SIGNIFICANT CHANGE UP (ref 3.5–5.3)
POTASSIUM SERPL-SCNC: 4.2 MMOL/L — SIGNIFICANT CHANGE UP (ref 3.5–5.3)
RBC # BLD: 2.72 M/UL — LOW (ref 3.8–5.2)
RBC # FLD: 12.8 % — SIGNIFICANT CHANGE UP (ref 10.3–14.5)
SODIUM SERPL-SCNC: 140 MMOL/L — SIGNIFICANT CHANGE UP (ref 135–145)
WBC # BLD: 4.08 K/UL — SIGNIFICANT CHANGE UP (ref 3.8–10.5)
WBC # FLD AUTO: 4.08 K/UL — SIGNIFICANT CHANGE UP (ref 3.8–10.5)

## 2023-01-27 PROCEDURE — 99232 SBSQ HOSP IP/OBS MODERATE 35: CPT

## 2023-01-27 RX ORDER — LEVOTHYROXINE SODIUM 125 MCG
125 TABLET ORAL DAILY
Refills: 0 | Status: DISCONTINUED | OUTPATIENT
Start: 2023-01-28 | End: 2023-02-01

## 2023-01-27 RX ORDER — POLYETHYLENE GLYCOL 3350 17 G/17G
17 POWDER, FOR SOLUTION ORAL
Qty: 0 | Refills: 0 | DISCHARGE
Start: 2023-01-27

## 2023-01-27 RX ORDER — FAMOTIDINE 10 MG/ML
1 INJECTION INTRAVENOUS
Qty: 30 | Refills: 0
Start: 2023-01-27 | End: 2023-02-25

## 2023-01-27 RX ORDER — LEVOTHYROXINE SODIUM 125 MCG
1 TABLET ORAL
Qty: 30 | Refills: 0
Start: 2023-01-27 | End: 2023-02-25

## 2023-01-27 RX ORDER — FAMOTIDINE 10 MG/ML
1 INJECTION INTRAVENOUS
Qty: 0 | Refills: 0 | DISCHARGE
Start: 2023-01-27

## 2023-01-27 RX ORDER — SENNA PLUS 8.6 MG/1
2 TABLET ORAL
Qty: 0 | Refills: 0 | DISCHARGE
Start: 2023-01-27

## 2023-01-27 RX ORDER — OXYCODONE HYDROCHLORIDE 5 MG/1
1 TABLET ORAL
Qty: 0 | Refills: 0 | DISCHARGE
Start: 2023-01-27

## 2023-01-27 RX ORDER — OXYCODONE HYDROCHLORIDE 5 MG/1
1 TABLET ORAL
Qty: 20 | Refills: 0
Start: 2023-01-27 | End: 2023-01-31

## 2023-01-27 RX ORDER — POLYETHYLENE GLYCOL 3350 17 G/17G
17 POWDER, FOR SOLUTION ORAL
Qty: 510 | Refills: 0
Start: 2023-01-27 | End: 2023-02-25

## 2023-01-27 RX ORDER — SENNA PLUS 8.6 MG/1
2 TABLET ORAL
Qty: 60 | Refills: 0
Start: 2023-01-27 | End: 2023-02-25

## 2023-01-27 RX ORDER — ENOXAPARIN SODIUM 100 MG/ML
40 INJECTION SUBCUTANEOUS EVERY 24 HOURS
Refills: 0 | Status: DISCONTINUED | OUTPATIENT
Start: 2023-01-27 | End: 2023-02-01

## 2023-01-27 RX ADMIN — FAMOTIDINE 20 MILLIGRAM(S): 10 INJECTION INTRAVENOUS at 12:30

## 2023-01-27 RX ADMIN — ENOXAPARIN SODIUM 40 MILLIGRAM(S): 100 INJECTION SUBCUTANEOUS at 12:30

## 2023-01-27 RX ADMIN — CHLORHEXIDINE GLUCONATE 15 MILLILITER(S): 213 SOLUTION TOPICAL at 18:37

## 2023-01-27 RX ADMIN — QUETIAPINE FUMARATE 50 MILLIGRAM(S): 200 TABLET, FILM COATED ORAL at 23:21

## 2023-01-27 RX ADMIN — GABAPENTIN 200 MILLIGRAM(S): 400 CAPSULE ORAL at 23:20

## 2023-01-27 RX ADMIN — OXYCODONE HYDROCHLORIDE 10 MILLIGRAM(S): 5 TABLET ORAL at 12:29

## 2023-01-27 RX ADMIN — AMPICILLIN SODIUM AND SULBACTAM SODIUM 200 GRAM(S): 250; 125 INJECTION, POWDER, FOR SUSPENSION INTRAMUSCULAR; INTRAVENOUS at 05:59

## 2023-01-27 RX ADMIN — GABAPENTIN 200 MILLIGRAM(S): 400 CAPSULE ORAL at 13:57

## 2023-01-27 RX ADMIN — SERTRALINE 50 MILLIGRAM(S): 25 TABLET, FILM COATED ORAL at 23:21

## 2023-01-27 RX ADMIN — CHLORHEXIDINE GLUCONATE 15 MILLILITER(S): 213 SOLUTION TOPICAL at 06:00

## 2023-01-27 RX ADMIN — OXYCODONE HYDROCHLORIDE 10 MILLIGRAM(S): 5 TABLET ORAL at 08:24

## 2023-01-27 RX ADMIN — OXYCODONE HYDROCHLORIDE 10 MILLIGRAM(S): 5 TABLET ORAL at 07:54

## 2023-01-27 RX ADMIN — Medication 90 MICROGRAM(S): at 05:59

## 2023-01-27 RX ADMIN — OXYCODONE HYDROCHLORIDE 10 MILLIGRAM(S): 5 TABLET ORAL at 18:36

## 2023-01-27 RX ADMIN — GABAPENTIN 200 MILLIGRAM(S): 400 CAPSULE ORAL at 06:00

## 2023-01-27 RX ADMIN — OXYCODONE HYDROCHLORIDE 10 MILLIGRAM(S): 5 TABLET ORAL at 12:59

## 2023-01-27 RX ADMIN — AMPICILLIN SODIUM AND SULBACTAM SODIUM 200 GRAM(S): 250; 125 INJECTION, POWDER, FOR SUSPENSION INTRAMUSCULAR; INTRAVENOUS at 12:34

## 2023-01-27 RX ADMIN — Medication 1 TABLET(S): at 18:37

## 2023-01-27 NOTE — PROGRESS NOTE ADULT - ASSESSMENT
47y Female with PMH of depression, anxiety, and thyroid cancer s/p left partial thyroidectomy (1996) and central neck dissection (5/22) with recent admission at [facility] for left facial abscess p/w recurrent abscess, failed output PO abx. S/p OR for with ENT for facial abscess. CT head with no extracranial extension.

## 2023-01-27 NOTE — PROGRESS NOTE ADULT - NSPROGADDITIONALINFOA_GEN_ALL_CORE
Appreciate ENT eval, stable for DC home with close outpatient follow up. Appreciate ID recs regarding abx course, ID follow up as outpatient. Optimized for discharge home today.

## 2023-01-27 NOTE — PROGRESS NOTE ADULT - PROBLEM SELECTOR PLAN 1
ENT, dental and OMFS consulted, /p I&D of abscess and tooth extraction 1/12; CT max facial shows worsening abscess  - previous abscess culture grew strep anginosus and Prevotella denticola  - s/p OR with ENT on 1/18, procedure note patient with hematoma evacuation  - Abscess culture showing Strep mitis oralis  - BCX NGTD  - c/w Peridex swish and spit BID  - Transitioned to augmentin on discharge 875 q12hrs x 2 weeks with ENT and ID follow up  - C/w pain control - PRN oxycodone, avoiding IV opioids as able, continue to monitor closely. Patient reporting continued facial pain, ENT informed, evaluated, per ENT stable for discharge with close outpatient follow up

## 2023-01-27 NOTE — PROGRESS NOTE ADULT - SUBJECTIVE AND OBJECTIVE BOX
Patient is a 47y old  Female who presents with a chief complaint of abscess (26 Jan 2023 16:50)    SUBJECTIVE / OVERNIGHT EVENTS: No acute events. Migraine/headache resolved. Still with left facial pain, reports similar to prior. No fever, chills, nausea, vomiting, bleeding.     MEDICATIONS  (STANDING):  ampicillin/sulbactam  IVPB 3 Gram(s) IV Intermittent every 6 hours  chlorhexidine 0.12% Liquid 15 milliLiter(s) Swish and Spit two times a day  enoxaparin Injectable 40 milliGRAM(s) SubCutaneous every 24 hours  famotidine    Tablet 20 milliGRAM(s) Oral daily  gabapentin 200 milliGRAM(s) Oral every 8 hours  influenza   Vaccine 0.5 milliLiter(s) IntraMuscular once  levothyroxine Injectable 90 MICROGram(s) IV Push <User Schedule>  polyethylene glycol 3350 17 Gram(s) Oral daily  QUEtiapine 50 milliGRAM(s) Oral daily  senna 2 Tablet(s) Oral at bedtime  sertraline 50 milliGRAM(s) Oral daily  sodium chloride 0.9%. 1000 milliLiter(s) (100 mL/Hr) IV Continuous <Continuous>    MEDICATIONS  (PRN):  ketorolac   Injectable 15 milliGRAM(s) IV Push every 6 hours PRN Mild Pain (1 - 3)  oxyCODONE    IR 10 milliGRAM(s) Oral every 4 hours PRN Moderate Pain (4 - 6)  oxyCODONE    IR 5 milliGRAM(s) Oral every 8 hours PRN Mild Pain (1 - 3)  oxyCODONE    IR 15 milliGRAM(s) Oral every 6 hours PRN Severe Pain (7 - 10)    CAPILLARY BLOOD GLUCOSE    I&O's Summary    PHYSICAL EXAM:  Vital Signs Last 24 Hrs  T(C): 36.6 (27 Jan 2023 09:55), Max: 36.7 (27 Jan 2023 05:56)  T(F): 97.9 (27 Jan 2023 09:55), Max: 98 (27 Jan 2023 05:56)  HR: 86 (27 Jan 2023 09:55) (62 - 86)  BP: 102/58 (27 Jan 2023 09:55) (102/58 - 129/80)  BP(mean): --  RR: 18 (27 Jan 2023 09:55) (16 - 18)  SpO2: 100% (27 Jan 2023 09:55) (95% - 100%)    Parameters below as of 27 Jan 2023 09:55  Patient On (Oxygen Delivery Method): room air    CONSTITUTIONAL: NAD, well-developed, well-groomed  EYES: conjunctiva and sclera clear  ENMT: Moist oral mucosa, + left facial swelling   RESPIRATORY: Normal respiratory effort; lungs are clear to auscultation bilaterally  CARDIOVASCULAR: Regular rate and rhythm, normal S1 and S2, trace bilateral lower extremity edema  ABDOMEN: Nontender to palpation, normoactive bowel sounds, no rebound/guarding  PSYCH: calm; affect appropriate  NEUROLOGY: moving all extremities  SKIN: No rashes    LABS:                        9.5    4.08  )-----------( 271      ( 27 Jan 2023 05:40 )             30.2     01-27    140  |  106  |  19  ----------------------------<  83  4.2   |  28  |  1.01    Ca    8.7      27 Jan 2023 05:40  Phos  4.9     01-27  Mg     2.00     01-27    RADIOLOGY & ADDITIONAL TESTS: Reviewed    COORDINATION OF CARE:  Care Discussed with Consultants/Other Providers [Y- ENT, Medicine ACP]

## 2023-01-27 NOTE — PROGRESS NOTE ADULT - PROBLEM SELECTOR PLAN 6
Encourage ambulation; IMPROVE score 0  Dispo: pain control.    #Lightheadedness: Transient, self resolved, no syncope or LOC. Orthostats negative.

## 2023-01-28 LAB
ANION GAP SERPL CALC-SCNC: 9 MMOL/L — SIGNIFICANT CHANGE UP (ref 7–14)
BASOPHILS # BLD AUTO: 0.05 K/UL — SIGNIFICANT CHANGE UP (ref 0–0.2)
BASOPHILS NFR BLD AUTO: 1.1 % — SIGNIFICANT CHANGE UP (ref 0–2)
BUN SERPL-MCNC: 13 MG/DL — SIGNIFICANT CHANGE UP (ref 7–23)
CALCIUM SERPL-MCNC: 8.9 MG/DL — SIGNIFICANT CHANGE UP (ref 8.4–10.5)
CHLORIDE SERPL-SCNC: 104 MMOL/L — SIGNIFICANT CHANGE UP (ref 98–107)
CO2 SERPL-SCNC: 28 MMOL/L — SIGNIFICANT CHANGE UP (ref 22–31)
CREAT SERPL-MCNC: 0.9 MG/DL — SIGNIFICANT CHANGE UP (ref 0.5–1.3)
EGFR: 79 ML/MIN/1.73M2 — SIGNIFICANT CHANGE UP
EOSINOPHIL # BLD AUTO: 0.36 K/UL — SIGNIFICANT CHANGE UP (ref 0–0.5)
EOSINOPHIL NFR BLD AUTO: 8.1 % — HIGH (ref 0–6)
GLUCOSE SERPL-MCNC: 86 MG/DL — SIGNIFICANT CHANGE UP (ref 70–99)
HCT VFR BLD CALC: 31.7 % — LOW (ref 34.5–45)
HGB BLD-MCNC: 10.1 G/DL — LOW (ref 11.5–15.5)
IANC: 2.32 K/UL — SIGNIFICANT CHANGE UP (ref 1.8–7.4)
IMM GRANULOCYTES NFR BLD AUTO: 0.2 % — SIGNIFICANT CHANGE UP (ref 0–0.9)
LYMPHOCYTES # BLD AUTO: 1.25 K/UL — SIGNIFICANT CHANGE UP (ref 1–3.3)
LYMPHOCYTES # BLD AUTO: 28.1 % — SIGNIFICANT CHANGE UP (ref 13–44)
MAGNESIUM SERPL-MCNC: 2 MG/DL — SIGNIFICANT CHANGE UP (ref 1.6–2.6)
MCHC RBC-ENTMCNC: 31.9 GM/DL — LOW (ref 32–36)
MCHC RBC-ENTMCNC: 34.9 PG — HIGH (ref 27–34)
MCV RBC AUTO: 109.7 FL — HIGH (ref 80–100)
MONOCYTES # BLD AUTO: 0.46 K/UL — SIGNIFICANT CHANGE UP (ref 0–0.9)
MONOCYTES NFR BLD AUTO: 10.3 % — SIGNIFICANT CHANGE UP (ref 2–14)
NEUTROPHILS # BLD AUTO: 2.32 K/UL — SIGNIFICANT CHANGE UP (ref 1.8–7.4)
NEUTROPHILS NFR BLD AUTO: 52.2 % — SIGNIFICANT CHANGE UP (ref 43–77)
NRBC # BLD: 0 /100 WBCS — SIGNIFICANT CHANGE UP (ref 0–0)
NRBC # FLD: 0 K/UL — SIGNIFICANT CHANGE UP (ref 0–0)
PHOSPHATE SERPL-MCNC: 5.1 MG/DL — HIGH (ref 2.5–4.5)
PLATELET # BLD AUTO: 289 K/UL — SIGNIFICANT CHANGE UP (ref 150–400)
POTASSIUM SERPL-MCNC: 4.3 MMOL/L — SIGNIFICANT CHANGE UP (ref 3.5–5.3)
POTASSIUM SERPL-SCNC: 4.3 MMOL/L — SIGNIFICANT CHANGE UP (ref 3.5–5.3)
RBC # BLD: 2.89 M/UL — LOW (ref 3.8–5.2)
RBC # FLD: 12.5 % — SIGNIFICANT CHANGE UP (ref 10.3–14.5)
SODIUM SERPL-SCNC: 141 MMOL/L — SIGNIFICANT CHANGE UP (ref 135–145)
WBC # BLD: 4.45 K/UL — SIGNIFICANT CHANGE UP (ref 3.8–10.5)
WBC # FLD AUTO: 4.45 K/UL — SIGNIFICANT CHANGE UP (ref 3.8–10.5)

## 2023-01-28 PROCEDURE — 99232 SBSQ HOSP IP/OBS MODERATE 35: CPT

## 2023-01-28 RX ADMIN — GABAPENTIN 200 MILLIGRAM(S): 400 CAPSULE ORAL at 22:28

## 2023-01-28 RX ADMIN — OXYCODONE HYDROCHLORIDE 10 MILLIGRAM(S): 5 TABLET ORAL at 16:31

## 2023-01-28 RX ADMIN — OXYCODONE HYDROCHLORIDE 10 MILLIGRAM(S): 5 TABLET ORAL at 17:34

## 2023-01-28 RX ADMIN — OXYCODONE HYDROCHLORIDE 10 MILLIGRAM(S): 5 TABLET ORAL at 22:31

## 2023-01-28 RX ADMIN — Medication 125 MICROGRAM(S): at 07:37

## 2023-01-28 RX ADMIN — QUETIAPINE FUMARATE 50 MILLIGRAM(S): 200 TABLET, FILM COATED ORAL at 22:29

## 2023-01-28 RX ADMIN — CHLORHEXIDINE GLUCONATE 15 MILLILITER(S): 213 SOLUTION TOPICAL at 18:10

## 2023-01-28 RX ADMIN — OXYCODONE HYDROCHLORIDE 10 MILLIGRAM(S): 5 TABLET ORAL at 06:05

## 2023-01-28 RX ADMIN — Medication 1 TABLET(S): at 06:06

## 2023-01-28 RX ADMIN — OXYCODONE HYDROCHLORIDE 10 MILLIGRAM(S): 5 TABLET ORAL at 13:27

## 2023-01-28 RX ADMIN — OXYCODONE HYDROCHLORIDE 10 MILLIGRAM(S): 5 TABLET ORAL at 07:00

## 2023-01-28 RX ADMIN — Medication 1 TABLET(S): at 18:11

## 2023-01-28 RX ADMIN — OXYCODONE HYDROCHLORIDE 10 MILLIGRAM(S): 5 TABLET ORAL at 23:30

## 2023-01-28 RX ADMIN — ENOXAPARIN SODIUM 40 MILLIGRAM(S): 100 INJECTION SUBCUTANEOUS at 12:06

## 2023-01-28 RX ADMIN — CHLORHEXIDINE GLUCONATE 15 MILLILITER(S): 213 SOLUTION TOPICAL at 06:06

## 2023-01-28 RX ADMIN — GABAPENTIN 200 MILLIGRAM(S): 400 CAPSULE ORAL at 06:05

## 2023-01-28 RX ADMIN — SERTRALINE 50 MILLIGRAM(S): 25 TABLET, FILM COATED ORAL at 22:29

## 2023-01-28 RX ADMIN — FAMOTIDINE 20 MILLIGRAM(S): 10 INJECTION INTRAVENOUS at 12:08

## 2023-01-28 RX ADMIN — GABAPENTIN 200 MILLIGRAM(S): 400 CAPSULE ORAL at 13:33

## 2023-01-28 RX ADMIN — OXYCODONE HYDROCHLORIDE 10 MILLIGRAM(S): 5 TABLET ORAL at 12:11

## 2023-01-28 NOTE — PROGRESS NOTE ADULT - ASSESSMENT
47y Female with PMH of depression, anxiety, and thyroid cancer s/p left partial thyroidectomy (1996) and central neck dissection (5/22) with recent admission at [facility] for left facial abscess p/w recurrent abscess, failed output PO abx. S/p OR for with ENT for facial abscess. CT head with no extracranial extension.

## 2023-01-28 NOTE — PROGRESS NOTE ADULT - PROBLEM SELECTOR PLAN 1
ENT, dental and OMFS consulted, /p I&D of abscess and tooth extraction 1/12; CT max facial shows worsening abscess  - previous abscess culture grew strep anginosus and Prevotella denticola  - s/p OR with ENT on 1/18, procedure note patient with hematoma evacuation  - Abscess culture showing Strep mitis oralis  - BCX NGTD  - c/w Peridex swish and spit BID  - Transitioned to augmentin on discharge 875 q12hrs x 2 weeks with ENT and ID follow up  - C/w pain control - PRN oxycodone, avoiding IV opioids as able, continue to monitor closely.   - Stable for DC per ENT with close outpt follow up

## 2023-01-28 NOTE — PROGRESS NOTE ADULT - SUBJECTIVE AND OBJECTIVE BOX
Patient is a 47y old  Female who presents with a chief complaint of abscess (27 Jan 2023 12:57)    SUBJECTIVE / OVERNIGHT EVENTS: No acute events. No further headaches. Left facial swelling appears to be going down per pt. No fevers, chills, nausea, vomiting, chest pain, shortness of breath, abdominal pain, constipation, rash.    MEDICATIONS  (STANDING):  amoxicillin  875 milliGRAM(s)/clavulanate 1 Tablet(s) Oral every 12 hours  chlorhexidine 0.12% Liquid 15 milliLiter(s) Swish and Spit two times a day  enoxaparin Injectable 40 milliGRAM(s) SubCutaneous every 24 hours  famotidine    Tablet 20 milliGRAM(s) Oral daily  gabapentin 200 milliGRAM(s) Oral every 8 hours  influenza   Vaccine 0.5 milliLiter(s) IntraMuscular once  levothyroxine 125 MICROGram(s) Oral daily  polyethylene glycol 3350 17 Gram(s) Oral daily  QUEtiapine 50 milliGRAM(s) Oral daily  senna 2 Tablet(s) Oral at bedtime  sertraline 50 milliGRAM(s) Oral daily  sodium chloride 0.9%. 1000 milliLiter(s) (100 mL/Hr) IV Continuous <Continuous>    MEDICATIONS  (PRN):  oxyCODONE    IR 10 milliGRAM(s) Oral every 4 hours PRN Moderate Pain (4 - 6)  oxyCODONE    IR 15 milliGRAM(s) Oral every 6 hours PRN Severe Pain (7 - 10)  oxyCODONE    IR 5 milliGRAM(s) Oral every 8 hours PRN Mild Pain (1 - 3)    CAPILLARY BLOOD GLUCOSE    I&O's Summary    PHYSICAL EXAM:  Vital Signs Last 24 Hrs  T(C): 36.4 (28 Jan 2023 09:21), Max: 36.8 (27 Jan 2023 18:35)  T(F): 97.5 (28 Jan 2023 09:21), Max: 98.3 (27 Jan 2023 18:35)  HR: 84 (28 Jan 2023 09:21) (84 - 89)  BP: 99/68 (28 Jan 2023 09:21) (99/68 - 117/68)  BP(mean): --  RR: 17 (28 Jan 2023 09:21) (17 - 18)  SpO2: 100% (28 Jan 2023 09:21) (100% - 100%)    Parameters below as of 28 Jan 2023 09:21  Patient On (Oxygen Delivery Method): room air    CONSTITUTIONAL: NAD, well-developed, well-groomed  EYES: conjunctiva and sclera clear  ENMT: Moist oral mucosa, + left facial swelling, stable/improving from prior exam, no erythema   RESPIRATORY: Normal respiratory effort; lungs are clear to auscultation bilaterally  CARDIOVASCULAR: Regular rate and rhythm, normal S1 and S2, trace bilateral lower extremity edema  ABDOMEN: Nontender to palpation, normoactive bowel sounds, no rebound/guarding  PSYCH: calm; affect appropriate  NEUROLOGY: moving all extremities    LABS:                        10.1   4.45  )-----------( 289      ( 28 Jan 2023 05:22 )             31.7     01-28    141  |  104  |  13  ----------------------------<  86  4.3   |  28  |  0.90    Ca    8.9      28 Jan 2023 05:22  Phos  5.1     01-28  Mg     2.00     01-28    RADIOLOGY & ADDITIONAL TESTS: Reviewed    COORDINATION OF CARE:  Care Discussed with Consultants/Other Providers [Y- Medicine ACP, ENT]

## 2023-01-29 LAB
ANION GAP SERPL CALC-SCNC: 6 MMOL/L — LOW (ref 7–14)
BASOPHILS # BLD AUTO: 0.06 K/UL — SIGNIFICANT CHANGE UP (ref 0–0.2)
BASOPHILS NFR BLD AUTO: 1.5 % — SIGNIFICANT CHANGE UP (ref 0–2)
BUN SERPL-MCNC: 12 MG/DL — SIGNIFICANT CHANGE UP (ref 7–23)
CALCIUM SERPL-MCNC: 8.9 MG/DL — SIGNIFICANT CHANGE UP (ref 8.4–10.5)
CHLORIDE SERPL-SCNC: 102 MMOL/L — SIGNIFICANT CHANGE UP (ref 98–107)
CO2 SERPL-SCNC: 29 MMOL/L — SIGNIFICANT CHANGE UP (ref 22–31)
CREAT SERPL-MCNC: 0.85 MG/DL — SIGNIFICANT CHANGE UP (ref 0.5–1.3)
EGFR: 84 ML/MIN/1.73M2 — SIGNIFICANT CHANGE UP
EOSINOPHIL # BLD AUTO: 0.23 K/UL — SIGNIFICANT CHANGE UP (ref 0–0.5)
EOSINOPHIL NFR BLD AUTO: 5.7 % — SIGNIFICANT CHANGE UP (ref 0–6)
GLUCOSE SERPL-MCNC: 81 MG/DL — SIGNIFICANT CHANGE UP (ref 70–99)
HCT VFR BLD CALC: 34.8 % — SIGNIFICANT CHANGE UP (ref 34.5–45)
HGB BLD-MCNC: 11 G/DL — LOW (ref 11.5–15.5)
IANC: 2.12 K/UL — SIGNIFICANT CHANGE UP (ref 1.8–7.4)
IMM GRANULOCYTES NFR BLD AUTO: 0.5 % — SIGNIFICANT CHANGE UP (ref 0–0.9)
LYMPHOCYTES # BLD AUTO: 1.24 K/UL — SIGNIFICANT CHANGE UP (ref 1–3.3)
LYMPHOCYTES # BLD AUTO: 30.9 % — SIGNIFICANT CHANGE UP (ref 13–44)
MAGNESIUM SERPL-MCNC: 2 MG/DL — SIGNIFICANT CHANGE UP (ref 1.6–2.6)
MCHC RBC-ENTMCNC: 31.6 GM/DL — LOW (ref 32–36)
MCHC RBC-ENTMCNC: 35.6 PG — HIGH (ref 27–34)
MCV RBC AUTO: 112.6 FL — HIGH (ref 80–100)
MONOCYTES # BLD AUTO: 0.34 K/UL — SIGNIFICANT CHANGE UP (ref 0–0.9)
MONOCYTES NFR BLD AUTO: 8.5 % — SIGNIFICANT CHANGE UP (ref 2–14)
NEUTROPHILS # BLD AUTO: 2.12 K/UL — SIGNIFICANT CHANGE UP (ref 1.8–7.4)
NEUTROPHILS NFR BLD AUTO: 52.9 % — SIGNIFICANT CHANGE UP (ref 43–77)
NRBC # BLD: 0 /100 WBCS — SIGNIFICANT CHANGE UP (ref 0–0)
NRBC # FLD: 0 K/UL — SIGNIFICANT CHANGE UP (ref 0–0)
PHOSPHATE SERPL-MCNC: 5 MG/DL — HIGH (ref 2.5–4.5)
PLATELET # BLD AUTO: 304 K/UL — SIGNIFICANT CHANGE UP (ref 150–400)
POTASSIUM SERPL-MCNC: 4.4 MMOL/L — SIGNIFICANT CHANGE UP (ref 3.5–5.3)
POTASSIUM SERPL-SCNC: 4.4 MMOL/L — SIGNIFICANT CHANGE UP (ref 3.5–5.3)
RBC # BLD: 3.09 M/UL — LOW (ref 3.8–5.2)
RBC # FLD: 12.8 % — SIGNIFICANT CHANGE UP (ref 10.3–14.5)
SODIUM SERPL-SCNC: 137 MMOL/L — SIGNIFICANT CHANGE UP (ref 135–145)
WBC # BLD: 4.01 K/UL — SIGNIFICANT CHANGE UP (ref 3.8–10.5)
WBC # FLD AUTO: 4.01 K/UL — SIGNIFICANT CHANGE UP (ref 3.8–10.5)

## 2023-01-29 PROCEDURE — 99232 SBSQ HOSP IP/OBS MODERATE 35: CPT

## 2023-01-29 RX ORDER — OXYCODONE HYDROCHLORIDE 5 MG/1
10 TABLET ORAL EVERY 4 HOURS
Refills: 0 | Status: DISCONTINUED | OUTPATIENT
Start: 2023-01-29 | End: 2023-02-01

## 2023-01-29 RX ORDER — ONDANSETRON 8 MG/1
4 TABLET, FILM COATED ORAL ONCE
Refills: 0 | Status: COMPLETED | OUTPATIENT
Start: 2023-01-29 | End: 2023-01-29

## 2023-01-29 RX ORDER — ACETAMINOPHEN 500 MG
650 TABLET ORAL EVERY 6 HOURS
Refills: 0 | Status: DISCONTINUED | OUTPATIENT
Start: 2023-01-29 | End: 2023-01-29

## 2023-01-29 RX ORDER — ACETAMINOPHEN 500 MG
650 TABLET ORAL EVERY 6 HOURS
Refills: 0 | Status: DISCONTINUED | OUTPATIENT
Start: 2023-01-29 | End: 2023-02-01

## 2023-01-29 RX ADMIN — Medication 650 MILLIGRAM(S): at 12:07

## 2023-01-29 RX ADMIN — OXYCODONE HYDROCHLORIDE 10 MILLIGRAM(S): 5 TABLET ORAL at 18:11

## 2023-01-29 RX ADMIN — Medication 1 TABLET(S): at 05:27

## 2023-01-29 RX ADMIN — OXYCODONE HYDROCHLORIDE 10 MILLIGRAM(S): 5 TABLET ORAL at 23:50

## 2023-01-29 RX ADMIN — CHLORHEXIDINE GLUCONATE 15 MILLILITER(S): 213 SOLUTION TOPICAL at 05:28

## 2023-01-29 RX ADMIN — QUETIAPINE FUMARATE 50 MILLIGRAM(S): 200 TABLET, FILM COATED ORAL at 22:50

## 2023-01-29 RX ADMIN — Medication 125 MICROGRAM(S): at 05:27

## 2023-01-29 RX ADMIN — OXYCODONE HYDROCHLORIDE 10 MILLIGRAM(S): 5 TABLET ORAL at 22:52

## 2023-01-29 RX ADMIN — OXYCODONE HYDROCHLORIDE 10 MILLIGRAM(S): 5 TABLET ORAL at 05:30

## 2023-01-29 RX ADMIN — OXYCODONE HYDROCHLORIDE 10 MILLIGRAM(S): 5 TABLET ORAL at 06:30

## 2023-01-29 RX ADMIN — OXYCODONE HYDROCHLORIDE 10 MILLIGRAM(S): 5 TABLET ORAL at 18:40

## 2023-01-29 RX ADMIN — Medication 1 TABLET(S): at 18:11

## 2023-01-29 RX ADMIN — ONDANSETRON 4 MILLIGRAM(S): 8 TABLET, FILM COATED ORAL at 19:59

## 2023-01-29 RX ADMIN — OXYCODONE HYDROCHLORIDE 10 MILLIGRAM(S): 5 TABLET ORAL at 09:37

## 2023-01-29 RX ADMIN — CHLORHEXIDINE GLUCONATE 15 MILLILITER(S): 213 SOLUTION TOPICAL at 18:11

## 2023-01-29 RX ADMIN — SERTRALINE 50 MILLIGRAM(S): 25 TABLET, FILM COATED ORAL at 22:49

## 2023-01-29 RX ADMIN — ENOXAPARIN SODIUM 40 MILLIGRAM(S): 100 INJECTION SUBCUTANEOUS at 12:07

## 2023-01-29 RX ADMIN — GABAPENTIN 200 MILLIGRAM(S): 400 CAPSULE ORAL at 22:49

## 2023-01-29 RX ADMIN — FAMOTIDINE 20 MILLIGRAM(S): 10 INJECTION INTRAVENOUS at 12:07

## 2023-01-29 RX ADMIN — OXYCODONE HYDROCHLORIDE 10 MILLIGRAM(S): 5 TABLET ORAL at 10:03

## 2023-01-29 RX ADMIN — GABAPENTIN 200 MILLIGRAM(S): 400 CAPSULE ORAL at 05:26

## 2023-01-29 RX ADMIN — GABAPENTIN 200 MILLIGRAM(S): 400 CAPSULE ORAL at 15:06

## 2023-01-29 NOTE — PROGRESS NOTE ADULT - PROBLEM SELECTOR PLAN 1
ENT, dental and OMFS consulted, /p I&D of abscess and tooth extraction 1/12; CT max facial shows worsening abscess  - previous abscess culture grew strep anginosus and Prevotella denticola  - s/p OR with ENT on 1/18, procedure note patient with hematoma evacuation  - Abscess culture showing Strep mitis oralis  - BCX NGTD  - c/w Peridex swish and spit BID  - Transitioned to augmentin on discharge 875 q12hrs x 2 weeks with ENT and ID follow up  - C/w pain control - PRN oxycodone- wean as able, avoiding IV opioids   - Stable for DC per ENT with close outpt follow up

## 2023-01-29 NOTE — PROGRESS NOTE ADULT - ASSESSMENT
47y Female with PMH of depression, anxiety, and thyroid cancer s/p left partial thyroidectomy (1996) and central neck dissection (5/22) with recent admission at [facility] for left facial abscess p/w recurrent abscess, failed output PO abx. S/p OR for with ENT for facial abscess. CT head with no extracranial extension.

## 2023-01-29 NOTE — PROGRESS NOTE ADULT - SUBJECTIVE AND OBJECTIVE BOX
Patient is a 47y old  Female who presents with a chief complaint of abscess (28 Jan 2023 15:26)    SUBJECTIVE / OVERNIGHT EVENTS: No acute events. Patient feels left facial swelling continues to improve. Pain also seems to be slowly improving. No fevers, chills, vomiting, chest pain, shortness of breath, rash.     MEDICATIONS  (STANDING):  acetaminophen     Tablet .. 650 milliGRAM(s) Oral every 6 hours  amoxicillin  875 milliGRAM(s)/clavulanate 1 Tablet(s) Oral every 12 hours  chlorhexidine 0.12% Liquid 15 milliLiter(s) Swish and Spit two times a day  enoxaparin Injectable 40 milliGRAM(s) SubCutaneous every 24 hours  famotidine    Tablet 20 milliGRAM(s) Oral daily  gabapentin 200 milliGRAM(s) Oral every 8 hours  influenza   Vaccine 0.5 milliLiter(s) IntraMuscular once  levothyroxine 125 MICROGram(s) Oral daily  polyethylene glycol 3350 17 Gram(s) Oral daily  QUEtiapine 50 milliGRAM(s) Oral daily  senna 2 Tablet(s) Oral at bedtime  sertraline 50 milliGRAM(s) Oral daily  sodium chloride 0.9%. 1000 milliLiter(s) (100 mL/Hr) IV Continuous <Continuous>    MEDICATIONS  (PRN):  oxyCODONE    IR 5 milliGRAM(s) Oral every 8 hours PRN Mild Pain (1 - 3)  oxyCODONE    IR 10 milliGRAM(s) Oral every 4 hours PRN Moderate Pain (4 - 6)  oxyCODONE    IR 15 milliGRAM(s) Oral every 6 hours PRN Severe Pain (7 - 10)    CAPILLARY BLOOD GLUCOSE    I&O's Summary    29 Jan 2023 07:01  -  29 Jan 2023 13:52  --------------------------------------------------------  IN: 240 mL / OUT: 0 mL / NET: 240 mL    PHYSICAL EXAM:  Vital Signs Last 24 Hrs  T(C): 36.2 (29 Jan 2023 09:06), Max: 37 (28 Jan 2023 22:52)  T(F): 97.2 (29 Jan 2023 09:06), Max: 98.6 (28 Jan 2023 22:52)  HR: 81 (29 Jan 2023 09:06) (78 - 98)  BP: 110/61 (29 Jan 2023 09:06) (107/59 - 118/72)  BP(mean): --  RR: 18 (29 Jan 2023 09:06) (17 - 18)  SpO2: 95% (29 Jan 2023 09:06) (95% - 100%)    Parameters below as of 29 Jan 2023 09:06  Patient On (Oxygen Delivery Method): room air    CONSTITUTIONAL: NAD, well-developed, well-groomed  EYES: conjunctiva and sclera clear  ENMT: Moist oral mucosa, + left facial swelling improving from prior exam, no erythema   RESPIRATORY: Normal respiratory effort; lungs are clear to auscultation bilaterally  CARDIOVASCULAR: Regular rate and rhythm, normal S1 and S2, trace bilateral lower extremity edema  ABDOMEN: Nontender to palpation, normoactive bowel sounds, no rebound/guarding  PSYCH: calm; affect appropriate  NEUROLOGY: moving all extremities    LABS:                        11.0   4.01  )-----------( 304      ( 29 Jan 2023 05:36 )             34.8     01-29    137  |  102  |  12  ----------------------------<  81  4.4   |  29  |  0.85    Ca    8.9      29 Jan 2023 05:36  Phos  5.0     01-29  Mg     2.00     01-29    RADIOLOGY & ADDITIONAL TESTS: Reviewed    COORDINATION OF CARE:  Care Discussed with Consultants/Other Providers [[facility] ACP]

## 2023-01-30 ENCOUNTER — TRANSCRIPTION ENCOUNTER (OUTPATIENT)
Age: 48
End: 2023-01-30

## 2023-01-30 LAB
ANION GAP SERPL CALC-SCNC: 8 MMOL/L — SIGNIFICANT CHANGE UP (ref 7–14)
BASOPHILS # BLD AUTO: 0.06 K/UL — SIGNIFICANT CHANGE UP (ref 0–0.2)
BASOPHILS NFR BLD AUTO: 1.5 % — SIGNIFICANT CHANGE UP (ref 0–2)
BUN SERPL-MCNC: 12 MG/DL — SIGNIFICANT CHANGE UP (ref 7–23)
CALCIUM SERPL-MCNC: 8.8 MG/DL — SIGNIFICANT CHANGE UP (ref 8.4–10.5)
CHLORIDE SERPL-SCNC: 103 MMOL/L — SIGNIFICANT CHANGE UP (ref 98–107)
CO2 SERPL-SCNC: 27 MMOL/L — SIGNIFICANT CHANGE UP (ref 22–31)
CREAT SERPL-MCNC: 0.88 MG/DL — SIGNIFICANT CHANGE UP (ref 0.5–1.3)
EGFR: 81 ML/MIN/1.73M2 — SIGNIFICANT CHANGE UP
EOSINOPHIL # BLD AUTO: 0.24 K/UL — SIGNIFICANT CHANGE UP (ref 0–0.5)
EOSINOPHIL NFR BLD AUTO: 6.1 % — HIGH (ref 0–6)
GLUCOSE SERPL-MCNC: 85 MG/DL — SIGNIFICANT CHANGE UP (ref 70–99)
HCT VFR BLD CALC: 32.3 % — LOW (ref 34.5–45)
HGB BLD-MCNC: 10.4 G/DL — LOW (ref 11.5–15.5)
IANC: 1.8 K/UL — SIGNIFICANT CHANGE UP (ref 1.8–7.4)
IMM GRANULOCYTES NFR BLD AUTO: 0.3 % — SIGNIFICANT CHANGE UP (ref 0–0.9)
LYMPHOCYTES # BLD AUTO: 1.37 K/UL — SIGNIFICANT CHANGE UP (ref 1–3.3)
LYMPHOCYTES # BLD AUTO: 34.6 % — SIGNIFICANT CHANGE UP (ref 13–44)
MAGNESIUM SERPL-MCNC: 2 MG/DL — SIGNIFICANT CHANGE UP (ref 1.6–2.6)
MCHC RBC-ENTMCNC: 32.2 GM/DL — SIGNIFICANT CHANGE UP (ref 32–36)
MCHC RBC-ENTMCNC: 35.5 PG — HIGH (ref 27–34)
MCV RBC AUTO: 110.2 FL — HIGH (ref 80–100)
MONOCYTES # BLD AUTO: 0.48 K/UL — SIGNIFICANT CHANGE UP (ref 0–0.9)
MONOCYTES NFR BLD AUTO: 12.1 % — SIGNIFICANT CHANGE UP (ref 2–14)
NEUTROPHILS # BLD AUTO: 1.8 K/UL — SIGNIFICANT CHANGE UP (ref 1.8–7.4)
NEUTROPHILS NFR BLD AUTO: 45.4 % — SIGNIFICANT CHANGE UP (ref 43–77)
NRBC # BLD: 0 /100 WBCS — SIGNIFICANT CHANGE UP (ref 0–0)
NRBC # FLD: 0 K/UL — SIGNIFICANT CHANGE UP (ref 0–0)
PHOSPHATE SERPL-MCNC: 4.9 MG/DL — HIGH (ref 2.5–4.5)
PLATELET # BLD AUTO: 273 K/UL — SIGNIFICANT CHANGE UP (ref 150–400)
POTASSIUM SERPL-MCNC: 4.3 MMOL/L — SIGNIFICANT CHANGE UP (ref 3.5–5.3)
POTASSIUM SERPL-SCNC: 4.3 MMOL/L — SIGNIFICANT CHANGE UP (ref 3.5–5.3)
RBC # BLD: 2.93 M/UL — LOW (ref 3.8–5.2)
RBC # FLD: 12.7 % — SIGNIFICANT CHANGE UP (ref 10.3–14.5)
SODIUM SERPL-SCNC: 138 MMOL/L — SIGNIFICANT CHANGE UP (ref 135–145)
WBC # BLD: 3.96 K/UL — SIGNIFICANT CHANGE UP (ref 3.8–10.5)
WBC # FLD AUTO: 3.96 K/UL — SIGNIFICANT CHANGE UP (ref 3.8–10.5)

## 2023-01-30 PROCEDURE — 99232 SBSQ HOSP IP/OBS MODERATE 35: CPT

## 2023-01-30 RX ADMIN — OXYCODONE HYDROCHLORIDE 10 MILLIGRAM(S): 5 TABLET ORAL at 10:52

## 2023-01-30 RX ADMIN — QUETIAPINE FUMARATE 50 MILLIGRAM(S): 200 TABLET, FILM COATED ORAL at 22:06

## 2023-01-30 RX ADMIN — OXYCODONE HYDROCHLORIDE 10 MILLIGRAM(S): 5 TABLET ORAL at 06:48

## 2023-01-30 RX ADMIN — SENNA PLUS 2 TABLET(S): 8.6 TABLET ORAL at 22:06

## 2023-01-30 RX ADMIN — OXYCODONE HYDROCHLORIDE 10 MILLIGRAM(S): 5 TABLET ORAL at 11:45

## 2023-01-30 RX ADMIN — Medication 125 MICROGRAM(S): at 05:55

## 2023-01-30 RX ADMIN — OXYCODONE HYDROCHLORIDE 10 MILLIGRAM(S): 5 TABLET ORAL at 23:29

## 2023-01-30 RX ADMIN — CHLORHEXIDINE GLUCONATE 15 MILLILITER(S): 213 SOLUTION TOPICAL at 06:46

## 2023-01-30 RX ADMIN — FAMOTIDINE 20 MILLIGRAM(S): 10 INJECTION INTRAVENOUS at 11:54

## 2023-01-30 RX ADMIN — GABAPENTIN 200 MILLIGRAM(S): 400 CAPSULE ORAL at 05:55

## 2023-01-30 RX ADMIN — CHLORHEXIDINE GLUCONATE 15 MILLILITER(S): 213 SOLUTION TOPICAL at 17:34

## 2023-01-30 RX ADMIN — SERTRALINE 50 MILLIGRAM(S): 25 TABLET, FILM COATED ORAL at 22:11

## 2023-01-30 RX ADMIN — GABAPENTIN 200 MILLIGRAM(S): 400 CAPSULE ORAL at 22:05

## 2023-01-30 RX ADMIN — GABAPENTIN 200 MILLIGRAM(S): 400 CAPSULE ORAL at 13:25

## 2023-01-30 RX ADMIN — ENOXAPARIN SODIUM 40 MILLIGRAM(S): 100 INJECTION SUBCUTANEOUS at 11:55

## 2023-01-30 RX ADMIN — OXYCODONE HYDROCHLORIDE 10 MILLIGRAM(S): 5 TABLET ORAL at 22:05

## 2023-01-30 RX ADMIN — Medication 1 TABLET(S): at 05:58

## 2023-01-30 RX ADMIN — OXYCODONE HYDROCHLORIDE 10 MILLIGRAM(S): 5 TABLET ORAL at 07:48

## 2023-01-30 RX ADMIN — Medication 1 TABLET(S): at 17:33

## 2023-01-30 NOTE — PROGRESS NOTE ADULT - ASSESSMENT
47y Female with PMH of depression, anxiety, and thyroid cancer s/p left partial thyroidectomy (1996) and central neck dissection (5/22) with recent admission at [facility] for left facial abscess p/w recurrent abscess, failed output PO abx. S/p OR for with ENT for facial abscess. CT head with no extracranial extension.

## 2023-01-30 NOTE — PROGRESS NOTE ADULT - PROBLEM SELECTOR PLAN 6
Encourage ambulation; IMPROVE score 0  Dispo: pain control.    #Lightheadedness: Transient, self resolved, no syncope or LOC. Orthostats negative.    Dispo: medically optimized for discharge to home. Encourage ambulation; IMPROVE score 0  Dispo: pain control.    #Lightheadedness: Transient, self resolved, no syncope or LOC. Orthostats negative.    Dispo: medically optimized for discharge to home.    Discussed with  and PA.

## 2023-01-30 NOTE — DISCHARGE NOTE NURSING/CASE MANAGEMENT/SOCIAL WORK - NSDCFUADDAPPT_GEN_ALL_CORE_FT
Follow up with  Ophthalmology  [address] [place] [address]. [address]  [place] [address]  [phone]    Follow up with Dr. Favian Camacho of ENT 1 week after discharge    Follow up with your Primary Care Doctor.

## 2023-01-30 NOTE — DISCHARGE NOTE NURSING/CASE MANAGEMENT/SOCIAL WORK - PATIENT PORTAL LINK FT
You can access the FollowMyHealth Patient Portal offered by [facility] by registering at the following website: [email or web address]. By joining [employer]’s FollowMyHealth portal, you will also be able to view your health information using other applications (apps) compatible with our system.

## 2023-01-30 NOTE — DISCHARGE NOTE NURSING/CASE MANAGEMENT/SOCIAL WORK - NSDCPNINST_GEN_ALL_CORE
Make a follow up appointment with Dr Camacho. Also follow up with infectious diseases. Call if you develop a fever, or have worsening symptoms. Take your antibiotic as prescribed.

## 2023-01-30 NOTE — PROGRESS NOTE ADULT - SUBJECTIVE AND OBJECTIVE BOX
[facility] Division of Hospital Medicine  Nikolay Youssef DO  Available via MS Teams  In house pager [phone]    SUBJECTIVE / OVERNIGHT EVENTS:  Patient expressed she had some increased pain at the left mouth on brushing her teeth this morning, for which she took oxycodone.   Says she has been able to eat and swallow both solids and liquids without concern.  Says she has been using the bathroom without difficulties.  She reported her feet swelling has resolved over her hospital course.     ADDITIONAL REVIEW OF SYSTEMS:    MEDICATIONS  (STANDING):  amoxicillin  875 milliGRAM(s)/clavulanate 1 Tablet(s) Oral every 12 hours  chlorhexidine 0.12% Liquid 15 milliLiter(s) Swish and Spit two times a day  enoxaparin Injectable 40 milliGRAM(s) SubCutaneous every 24 hours  famotidine    Tablet 20 milliGRAM(s) Oral daily  gabapentin 200 milliGRAM(s) Oral every 8 hours  influenza   Vaccine 0.5 milliLiter(s) IntraMuscular once  levothyroxine 125 MICROGram(s) Oral daily  polyethylene glycol 3350 17 Gram(s) Oral daily  QUEtiapine 50 milliGRAM(s) Oral daily  senna 2 Tablet(s) Oral at bedtime  sertraline 50 milliGRAM(s) Oral daily  sodium chloride 0.9%. 1000 milliLiter(s) (100 mL/Hr) IV Continuous <Continuous>    MEDICATIONS  (PRN):  acetaminophen     Tablet .. 650 milliGRAM(s) Oral every 6 hours PRN Mild Pain (1 - 3)  oxyCODONE    IR 10 milliGRAM(s) Oral every 4 hours PRN Moderate to Severe pain      I&O's Summary    29 Jan 2023 07:01  -  30 Jan 2023 07:00  --------------------------------------------------------  IN: 720 mL / OUT: 0 mL / NET: 720 mL        PHYSICAL EXAM:  Vital Signs Last 24 Hrs  T(C): 36.3 (30 Jan 2023 10:15), Max: 36.8 (29 Jan 2023 21:47)  T(F): 97.3 (30 Jan 2023 10:15), Max: 98.2 (29 Jan 2023 21:47)  HR: 82 (30 Jan 2023 10:15) (65 - 91)  BP: 121/73 (30 Jan 2023 10:15) (107/72 - 121/73)  BP(mean): --  RR: 17 (30 Jan 2023 10:15) (17 - 18)  SpO2: 100% (30 Jan 2023 10:15) (94% - 100%)    Parameters below as of 30 Jan 2023 10:15  Patient On (Oxygen Delivery Method): room air      CONSTITUTIONAL: NAD, well-developed, well-groomed  EYES: PERRLA; conjunctiva and sclera clear  ENMT: left facial swelling localized around parotid area with dense indurated lesion, no erythema or warmth, no overt drainage, no discoloration  NECK: Supple, no palpable masses; no thyromegaly  RESPIRATORY: Normal respiratory effort; lungs are clear to auscultation bilaterally  CARDIOVASCULAR: Regular rate and rhythm, normal S1 and S2, no murmur/rub/gallop; No lower extremity edema; Peripheral pulses are 2+ bilaterally  ABDOMEN: Nontender to palpation, normoactive bowel sounds, no rebound/guarding; No hepatosplenomegaly  MUSCULOSKELETAL:  Normal gait; no clubbing or cyanosis of digits; no joint swelling or tenderness to palpation  PSYCH: A+O to person, place, and time; affect appropriate  NEUROLOGY: CN 2-12 are intact and symmetric; no gross sensory deficits   SKIN: No rashes; no palpable lesions    LABS:                        10.4   3.96  )-----------( 273      ( 30 Jan 2023 05:56 )             32.3     01-30    138  |  103  |  12  ----------------------------<  85  4.3   |  27  |  0.88    Ca    8.8      30 Jan 2023 05:56  Phos  4.9     01-30  Mg     2.00     01-30                COVID-19 PCR: NotDetec (17 Jan 2023 07:30)  COVID-19 PCR: NotDetec (03 Jan 2023 02:00)

## 2023-01-31 PROCEDURE — 99232 SBSQ HOSP IP/OBS MODERATE 35: CPT

## 2023-01-31 RX ORDER — ONDANSETRON 8 MG/1
4 TABLET, FILM COATED ORAL ONCE
Refills: 0 | Status: COMPLETED | OUTPATIENT
Start: 2023-01-31 | End: 2023-01-31

## 2023-01-31 RX ADMIN — GABAPENTIN 200 MILLIGRAM(S): 400 CAPSULE ORAL at 13:06

## 2023-01-31 RX ADMIN — OXYCODONE HYDROCHLORIDE 10 MILLIGRAM(S): 5 TABLET ORAL at 03:40

## 2023-01-31 RX ADMIN — OXYCODONE HYDROCHLORIDE 10 MILLIGRAM(S): 5 TABLET ORAL at 11:15

## 2023-01-31 RX ADMIN — ONDANSETRON 4 MILLIGRAM(S): 8 TABLET, FILM COATED ORAL at 10:15

## 2023-01-31 RX ADMIN — CHLORHEXIDINE GLUCONATE 15 MILLILITER(S): 213 SOLUTION TOPICAL at 05:09

## 2023-01-31 RX ADMIN — QUETIAPINE FUMARATE 50 MILLIGRAM(S): 200 TABLET, FILM COATED ORAL at 21:35

## 2023-01-31 RX ADMIN — Medication 1 TABLET(S): at 05:10

## 2023-01-31 RX ADMIN — GABAPENTIN 200 MILLIGRAM(S): 400 CAPSULE ORAL at 21:35

## 2023-01-31 RX ADMIN — FAMOTIDINE 20 MILLIGRAM(S): 10 INJECTION INTRAVENOUS at 13:06

## 2023-01-31 RX ADMIN — CHLORHEXIDINE GLUCONATE 15 MILLILITER(S): 213 SOLUTION TOPICAL at 17:58

## 2023-01-31 RX ADMIN — Medication 1 TABLET(S): at 17:58

## 2023-01-31 RX ADMIN — OXYCODONE HYDROCHLORIDE 10 MILLIGRAM(S): 5 TABLET ORAL at 02:35

## 2023-01-31 RX ADMIN — SENNA PLUS 2 TABLET(S): 8.6 TABLET ORAL at 21:34

## 2023-01-31 RX ADMIN — OXYCODONE HYDROCHLORIDE 10 MILLIGRAM(S): 5 TABLET ORAL at 17:58

## 2023-01-31 RX ADMIN — OXYCODONE HYDROCHLORIDE 10 MILLIGRAM(S): 5 TABLET ORAL at 18:58

## 2023-01-31 RX ADMIN — OXYCODONE HYDROCHLORIDE 10 MILLIGRAM(S): 5 TABLET ORAL at 22:13

## 2023-01-31 RX ADMIN — GABAPENTIN 200 MILLIGRAM(S): 400 CAPSULE ORAL at 05:09

## 2023-01-31 RX ADMIN — SERTRALINE 50 MILLIGRAM(S): 25 TABLET, FILM COATED ORAL at 21:33

## 2023-01-31 RX ADMIN — OXYCODONE HYDROCHLORIDE 10 MILLIGRAM(S): 5 TABLET ORAL at 10:15

## 2023-01-31 RX ADMIN — Medication 125 MICROGRAM(S): at 05:09

## 2023-01-31 NOTE — PROGRESS NOTE ADULT - ASSESSMENT
47y Female with PMH of depression, anxiety, and thyroid cancer s/p left partial thyroidectomy (1996) and central neck dissection (5/22) with recent admission at [facility] for left facial abscess p/w recurrent abscess, failed output PO abx. S/p OR for with ENT for facial abscess. CT head with no extracranial extension.

## 2023-01-31 NOTE — PROGRESS NOTE ADULT - PROBLEM SELECTOR PLAN 6
Encourage ambulation; IMPROVE score 0  Dispo: pain control.    #Lightheadedness: Transient, self resolved, no syncope or LOC. Orthostats negative.    Dispo: medically optimized for discharge to home.    Discussed with  and PA.

## 2023-01-31 NOTE — PROGRESS NOTE ADULT - SUBJECTIVE AND OBJECTIVE BOX
[facility] Division of Hospital Medicine  Nikolay Youssef DO  Available via MS Teams  In house pager [phone]    SUBJECTIVE / OVERNIGHT EVENTS:  This morning patient reporting some nausea without emesis and a headache.  Otherwise, no fevers, chills, cough, sore throat, chest pain.    ADDITIONAL REVIEW OF SYSTEMS:    MEDICATIONS  (STANDING):  amoxicillin  875 milliGRAM(s)/clavulanate 1 Tablet(s) Oral every 12 hours  chlorhexidine 0.12% Liquid 15 milliLiter(s) Swish and Spit two times a day  enoxaparin Injectable 40 milliGRAM(s) SubCutaneous every 24 hours  famotidine    Tablet 20 milliGRAM(s) Oral daily  gabapentin 200 milliGRAM(s) Oral every 8 hours  influenza   Vaccine 0.5 milliLiter(s) IntraMuscular once  levothyroxine 125 MICROGram(s) Oral daily  polyethylene glycol 3350 17 Gram(s) Oral daily  QUEtiapine 50 milliGRAM(s) Oral daily  senna 2 Tablet(s) Oral at bedtime  sertraline 50 milliGRAM(s) Oral daily  sodium chloride 0.9%. 1000 milliLiter(s) (100 mL/Hr) IV Continuous <Continuous>    MEDICATIONS  (PRN):  acetaminophen     Tablet .. 650 milliGRAM(s) Oral every 6 hours PRN Mild Pain (1 - 3)  oxyCODONE    IR 10 milliGRAM(s) Oral every 4 hours PRN Moderate to Severe pain      I&O's Summary      PHYSICAL EXAM:  Vital Signs Last 24 Hrs  T(C): 36.6 (31 Jan 2023 09:34), Max: 36.6 (31 Jan 2023 09:34)  T(F): 97.8 (31 Jan 2023 09:34), Max: 97.8 (31 Jan 2023 09:34)  HR: 80 (31 Jan 2023 09:34) (80 - 94)  BP: 107/70 (31 Jan 2023 09:34) (100/59 - 118/76)  BP(mean): --  RR: 18 (31 Jan 2023 09:34) (17 - 18)  SpO2: 100% (31 Jan 2023 09:34) (98% - 100%)    Parameters below as of 31 Jan 2023 09:34  Patient On (Oxygen Delivery Method): room air      CONSTITUTIONAL: NAD, well-developed, well-groomed  EYES: PERRLA; conjunctiva and sclera clear  ENMT: left facial swelling localized around parotid area with dense indurated lesion decreasing in size, no erythema or warmth, no overt drainage, no discoloration  NECK: Supple, no palpable masses; no thyromegaly  RESPIRATORY: Normal respiratory effort; lungs are clear to auscultation bilaterally  CARDIOVASCULAR: Regular rate and rhythm, normal S1 and S2, no murmur/rub/gallop; No lower extremity edema; Peripheral pulses are 2+ bilaterally  ABDOMEN: Nontender to palpation, normoactive bowel sounds, no rebound/guarding; No hepatosplenomegaly  MUSCULOSKELETAL:  Normal gait; no clubbing or cyanosis of digits; no joint swelling or tenderness to palpation  PSYCH: A+O to person, place, and time; affect appropriate  NEUROLOGY: CN 2-12 are intact and symmetric; no gross sensory deficits   SKIN: No rashes; no palpable lesions    LABS:                        10.4   3.96  )-----------( 273      ( 30 Jan 2023 05:56 )             32.3     01-30    138  |  103  |  12  ----------------------------<  85  4.3   |  27  |  0.88    Ca    8.8      30 Jan 2023 05:56  Phos  4.9     01-30  Mg     2.00     01-30                COVID-19 PCR: NotDetec (17 Jan 2023 07:30)  COVID-19 PCR: NotDetec (03 Jan 2023 02:00)

## 2023-01-31 NOTE — PROGRESS NOTE ADULT - PROBLEM SELECTOR PLAN 1
ENT, dental and OMFS consulted, /p I&D of abscess and tooth extraction 1/12; CT max facial shows worsening abscess  - previous abscess culture grew strep anginosus and Prevotella denticola  - s/p OR with ENT on 1/18, procedure note patient with hematoma evacuation  - Abscess culture showing Strep mitis oralis  - BCX NGTD  - c/w Peridex swish and spit BID  - Transitioned to augmentin on discharge 875 q12hrs x 2 weeks with ENT and ID follow up  - C/w pain control - PRN oxycodone- wean as able, avoiding IV opioids; potentially contributing to nausea  - Stable for DC per ENT with close outpt follow up

## 2023-02-01 VITALS
DIASTOLIC BLOOD PRESSURE: 72 MMHG | TEMPERATURE: 98 F | HEART RATE: 77 BPM | OXYGEN SATURATION: 100 % | SYSTOLIC BLOOD PRESSURE: 110 MMHG | RESPIRATION RATE: 18 BRPM

## 2023-02-01 PROCEDURE — 99239 HOSP IP/OBS DSCHRG MGMT >30: CPT

## 2023-02-01 RX ADMIN — OXYCODONE HYDROCHLORIDE 10 MILLIGRAM(S): 5 TABLET ORAL at 11:22

## 2023-02-01 RX ADMIN — OXYCODONE HYDROCHLORIDE 10 MILLIGRAM(S): 5 TABLET ORAL at 10:17

## 2023-02-01 RX ADMIN — GABAPENTIN 200 MILLIGRAM(S): 400 CAPSULE ORAL at 05:26

## 2023-02-01 RX ADMIN — CHLORHEXIDINE GLUCONATE 15 MILLILITER(S): 213 SOLUTION TOPICAL at 05:26

## 2023-02-01 RX ADMIN — Medication 1 TABLET(S): at 05:26

## 2023-02-01 RX ADMIN — Medication 125 MICROGRAM(S): at 05:26

## 2023-02-01 NOTE — PROGRESS NOTE ADULT - PROBLEM SELECTOR PROBLEM 4
Hypothyroid
Hypothyroid
Macrocytic anemia
Macrocytic anemia
Hypothyroid
Macrocytic anemia
Macrocytic anemia
Hypothyroid
Macrocytic anemia
Hypothyroid
Macrocytic anemia
Hypothyroid
Macrocytic anemia
Macrocytic anemia
Hypothyroid

## 2023-02-01 NOTE — PROGRESS NOTE ADULT - PROBLEM SELECTOR PROBLEM 3
Hypothyroid
Anxiety
Hypothyroid
Hypothyroid
Anxiety
Hypothyroid
Anxiety
Anxiety
Hypothyroid
Anxiety
Hypothyroid
Anxiety

## 2023-02-01 NOTE — PROGRESS NOTE ADULT - PROBLEM SELECTOR PLAN 2
- Patient with new headaches with floaters, Now resolved   - seen by optho, no overt concerns at this time, if experiences significant burst of floaters, flashing lights, curtain over vision, will reconsult   - CTH and CT maxillofacial reviewed, patient with no extracranial tracking, abscess location decreased compared to previous imaging   - ENT following, appreciate recs
C/w home sertraline and Seroquel
- Patient with new headaches with floaters, Now resolved   - seen by Ophtho, no overt concerns at this time, if experiences significant burst of floaters, flashing lights, curtain over vision, will reconsult   - CTH and CT maxillofacial reviewed, patient with no extracranial tracking, abscess location decreased compared to previous imaging   - ENT following, appreciate recs
C/w home sertraline and Seroquel
- Patient with new headaches with floaters, Now resolved   - seen by Ophtho, no overt concerns at this time; if experiences significant burst of floaters, flashing lights, curtain over vision, will reconsult   - CTH and CT maxillofacial reviewed, patient with no extracranial tracking, abscess location decreased compared to previous imaging   - ENT recs appreciated
- Patient with new headaches with floaters, given facial abscess will obtain repeat CT head and CT maxillofacial IVC to assess for abscess   - continue with current pain control
- Patient with new headaches with floaters,   - seen by optho, no overt concerns at this time, if experiences significant burst of floaters, flashing lights, curtain over vision, will reconsult   - CTH and CT maxillofacial reviewed, patient with no extracranial tracking, abscess loaction decreased compared to previous imaging   - ENT following, want to monitor patient given hematoma evacuation
- Patient with new headaches with floaters, Now resolved   - seen by Ophtho, no overt concerns at this time, if experiences significant burst of floaters, flashing lights, curtain over vision, will reconsult   - CTH and CT maxillofacial reviewed, patient with no extracranial tracking, abscess location decreased compared to previous imaging   - ENT following, appreciate recs
- Patient with new headaches with floaters, Now resolved   - seen by optho, no overt concerns at this time, if experiences significant burst of floaters, flashing lights, curtain over vision, will reconsult   - CTH and CT maxillofacial reviewed, patient with no extracranial tracking, abscess location decreased compared to previous imaging   - ENT following, want to monitor patient given hematoma evacuation
- Patient with new headaches with floaters, Now resolved   - seen by Ophtho, no overt concerns at this time, if experiences significant burst of floaters, flashing lights, curtain over vision, will reconsult   - CTH and CT maxillofacial reviewed, patient with no extracranial tracking, abscess location decreased compared to previous imaging   - ENT following, appreciate recs
- Patient with new headaches with floaters, Now resolved   - seen by Ophtho, no overt concerns at this time; if experiences significant burst of floaters, flashing lights, curtain over vision, will reconsult   - CTH and CT maxillofacial reviewed, patient with no extracranial tracking, abscess location decreased compared to previous imaging   - ENT recs appreciated
- Patient with new headaches with floaters, Now resolved   - seen by Ophtho, no overt concerns at this time, if experiences significant burst of floaters, flashing lights, curtain over vision, will reconsult   - CTH and CT maxillofacial reviewed, patient with no extracranial tracking, abscess location decreased compared to previous imaging   - ENT following, appreciate recs
- C/w home sertraline and Seroquel
C/w home sertraline and Seroquel
- Patient with new headaches with floaters, Now resolved   - seen by Ophtho, no overt concerns at this time; if experiences significant burst of floaters, flashing lights, curtain over vision, will reconsult   - CTH and CT maxillofacial reviewed, patient with no extracranial tracking, abscess location decreased compared to previous imaging   - ENT recs appreciated
C/w home sertraline and Seroquel
- Patient with new headaches with floaters, Now resolved   - seen by Ophtho, no overt concerns at this time, if experiences significant burst of floaters, flashing lights, curtain over vision, will reconsult   - CTH and CT maxillofacial reviewed, patient with no extracranial tracking, abscess location decreased compared to previous imaging   - ENT following, appreciate recs

## 2023-02-01 NOTE — PROGRESS NOTE ADULT - PROBLEM SELECTOR PLAN 6
Encourage ambulation; IMPROVE score 0  Dispo: pain control.    #Lightheadedness: Transient, self resolved, no syncope or LOC. Orthostats negative.    Dispo: medically optimized for discharge to home today.    Discussed with  and PA.

## 2023-02-01 NOTE — PROGRESS NOTE ADULT - PROBLEM SELECTOR PLAN 3
- C/w home sertraline and Seroquel
- C/w home sertraline and Seroquel
TSH markedly elevated to 36, T3 and T4 also low  -pt symptomatic with weight gain, edema and fatigue  -has been compliant with home levothyroxine  -has not had TSH checked outpt since thyroid surgery in June 2022  -suspect post-op hypothyroidism  -change to IV synthroid while inpatient and dc on synthroid 125mcg daily, per endo recs  - repeat free T4 in 1 week if still admitted at that time (1/20/23) to ensure FT4 levels are trending up, otherwise repeat TFTs in 4-6 weeks as outpatient   - defer u/s to outpatient  - discuss with endo to facilitate outpatient f/u  - repeat TFTs in 4-6 weeks
- C/w home sertraline and Seroquel
C/w synthroid
- C/w home sertraline and Seroquel
TSH markedly elevated to 36, T3 and T4 also low  -pt symptomatic with weight gain, edema and fatigue  -has been compliant with home levothyroxine  -has not had TSH checked outpt since thyroid surgery in June 2022  -suspect post-op hypothyroidism  -change to IV synthroid while inpatient and dc on synthroid 125mcg daily, per endo recs  -defer u/s to outpatient  -discuss with endo to facilitate outpatient f/u  -recheck next week but anticipate discharge over the weekend  -repeat TFTs in 4-6 weeks
TSH markedly elevated to 36, T3 and T4 also low  -pt symptomatic with weight gain, edema and fatigue  -has been compliant with home levothyroxine  -has not had TSH checked outpt since thyroid surgery in June 2022  -suspect post-op hypothyroidism  -increase levothyroxine to 150mcg daily  -check US thyroid tomorrow  -repeat TFTs in 4-6 weeks
- C/w home sertraline and Seroquel
TSH markedly elevated to 36, T3 and T4 also low  -pt symptomatic with weight gain, edema and fatigue  -has been compliant with home levothyroxine  -has not had TSH checked outpt since thyroid surgery in June 2022  -suspect post-op hypothyroidism  -c/w levothyroxine to 150mcg daily  -defer u/s to outpatient  -discuss with endo to facilitate outpatient f/u  -will change to IV synthroid inpatient and then outpatient oral per endo recs  -recheck next week but anticipate discharge over the weekend  -repeat TFTs in 4-6 weeks
- C/w home sertraline and Seroquel
TSH markedly elevated to 36, T3 and T4 also low;  been compliant with home levothyroxine  - has not had TSH checked outpt since thyroid surgery in June 2022: suspect likely post-op hypothyroidism  - change to IV synthroid while inpatient and dc on synthroid 125mcg daily, per endo recs  - repeat free T4 in 1 week if still admitted at that time (1/20/23) to ensure FT4 levels are trending up, otherwise repeat TFTs in 4-6 weeks as outpatient   - defer u/s to outpatient  - discuss with endo to facilitate outpatient f/u  - repeat TFTs in 4-6 weeks
- C/w home sertraline and Seroquel
TSH markedly elevated to 36, T3 and T4 also low  -pt symptomatic with weight gain, edema and fatigue  -has been compliant with home levothyroxine  -has not had TSH checked outpt since thyroid surgery in June 2022  -suspect post-op hypothyroidism  -change to IV synthroid while inpatient and dc on synthroid 125mcg daily, per endo recs  -defer u/s to outpatient  -discuss with endo to facilitate outpatient f/u  -recheck next week but anticipate discharge over the weekend  -repeat TFTs in 4-6 weeks
TSH markedly elevated to 36, T3 and T4 also low  -pt symptomatic with weight gain, edema and fatigue  -has been compliant with home levothyroxine  -has not had TSH checked outpt since thyroid surgery in June 2022  -suspect post-op hypothyroidism  -change to IV synthroid while inpatient and dc on synthroid 125mcg daily, per endo recs  -defer u/s to outpatient  -discuss with endo to facilitate outpatient f/u  -recheck next week but anticipate discharge over the weekend  -repeat TFTs in 4-6 weeks

## 2023-02-01 NOTE — PROGRESS NOTE ADULT - REASON FOR ADMISSION
abscess

## 2023-02-01 NOTE — PROGRESS NOTE ADULT - PROBLEM SELECTOR PROBLEM 5
Encounter for deep vein thrombosis (DVT) prophylaxis
Encounter for deep vein thrombosis (DVT) prophylaxis
Macrocytic anemia
Macrocytic anemia
Encounter for deep vein thrombosis (DVT) prophylaxis
Macrocytic anemia
Encounter for deep vein thrombosis (DVT) prophylaxis
Macrocytic anemia
Encounter for deep vein thrombosis (DVT) prophylaxis
Macrocytic anemia
Macrocytic anemia
Encounter for deep vein thrombosis (DVT) prophylaxis
Encounter for deep vein thrombosis (DVT) prophylaxis
Macrocytic anemia
Macrocytic anemia
Encounter for deep vein thrombosis (DVT) prophylaxis
Macrocytic anemia
Macrocytic anemia

## 2023-02-01 NOTE — PROGRESS NOTE ADULT - PROBLEM SELECTOR PROBLEM 2
Migraine
Anxiety
Migraine
Migraine
Anxiety
Migraine
Anxiety
Anxiety
Migraine
Anxiety
Anxiety
Migraine

## 2023-02-01 NOTE — PROGRESS NOTE ADULT - ASSESSMENT
47y Female with PMH of depression, anxiety, and thyroid cancer s/p left partial thyroidectomy (1996) and central neck dissection (5/22) with recent admission at [facility] for left facial abscess p/w recurrent abscess, failed output PO abx. S/p OR for with ENT for facial abscess. CT head with no extracranial extension.

## 2023-02-01 NOTE — PROGRESS NOTE ADULT - PROBLEM SELECTOR PROBLEM 1
Facial abscess
Swelling of left side of face
Facial abscess
Swelling of left side of face
Facial abscess
Swelling of left side of face
Facial abscess

## 2023-02-01 NOTE — PROGRESS NOTE ADULT - PROBLEM SELECTOR PROBLEM 6
Encounter for deep vein thrombosis (DVT) prophylaxis

## 2023-02-01 NOTE — PROGRESS NOTE ADULT - PROBLEM SELECTOR PLAN 4
Check Vitamin B12 and folate
- Vitamin B12 and folate levels normal, could be 2/2 hypothyroid  - H/H stable   - continue to monitor
Vitamin B12 and folate levels normal, could be 2/2 hypothyroid
Vitamin B12 and folate levels normal, could be 2/2 hypothyroid
TSH markedly elevated to 36, T3 and T4 also low;  been compliant with home levothyroxine  - has not had TSH checked outpt since thyroid surgery in June 2022: suspect likely post-op hypothyroidism  - change to IV synthroid while inpatient and dc on synthroid 125mcg daily, per endo recs  - repeat free T4 in 1 week if still admitted at that time (1/20/23) to ensure FT4 levels are trending up, otherwise repeat TFTs in 4-6 weeks as outpatient   - defer u/s to outpatient  - discuss with endo to facilitate outpatient f/u  - repeat TFTs in 4-6 weeks
TSH markedly elevated to 36, T3 and T4 also low;  been compliant with home levothyroxine  Appreciate inpatient endocrinology recs  - Recommending LT4 125mcg qd PO with close outpatient endo follow up and consideration of US at this time per endo, otherwise can follow up w/ Henrique Kim at [address] [facility], [address], [place], [phone]
TSH markedly elevated to 36, T3 and T4 also low;  been compliant with home levothyroxine  - has not had TSH checked outpt since thyroid surgery in June 2022: suspect likely post-op hypothyroidism  - change to IV synthroid while inpatient and dc on synthroid 125mcg daily, per endo recs  - repeat free T4 in 1 week if still admitted at that time (1/20/23) to ensure FT4 levels are trending up, otherwise repeat TFTs in 4-6 weeks as outpatient   - defer u/s to outpatient  - discuss with endo to facilitate outpatient f/u  - repeat TFTs in 4-6 weeks
TSH markedly elevated to 36, T3 and T4 also low;  been compliant with home levothyroxine  Appreciate inpatient endocrinology recs  - Recommending LT4 125mcg qd PO with close outpatient endo follow up and consideration of US at this time per endo, otherwise can follow up w/ Henrique Kim at [address] [facility], [address], [place], [phone]
TSH markedly elevated to 36, T3 and T4 also low;  been compliant with home levothyroxine  Appreciate inpatient endocrinology recs  - Recommending LT4 125mcg qd PO with close outpatient endo follow up and consideration of US at this time per endo, otherwise can follow up w/ Henrique Kim at [address] [facility], [address], [place], [phone]
TSH markedly elevated to 36, T3 and T4 also low;  been compliant with home levothyroxine  - has not had TSH checked outpt since thyroid surgery in June 2022: suspect likely post-op hypothyroidism  - change to IV synthroid while inpatient and dc on synthroid 125mcg daily, per endo recs  - repeat free T4 in 1 week if still admitted at that time (1/20/23) to ensure FT4 levels are trending up, otherwise repeat TFTs in 4-6 weeks as outpatient   - defer u/s to outpatient  - discuss with endo to facilitate outpatient f/u  - repeat TFTs in 4-6 weeks
Vitamin B12 and folate levels normal, could be 2/2 hypothyroid
Vitamin B12 and folate levels normal, could be 2/2 hypothyroid
TSH markedly elevated to 36, T3 and T4 also low;  been compliant with home levothyroxine  - has not had TSH checked outpt since thyroid surgery in June 2022: suspect likely post-op hypothyroidism  - change to IV synthroid while inpatient and dc on synthroid 125mcg daily, per endo recs  - repeat free T4 in 1 week if still admitted at that time (1/20/23) to ensure FT4 levels are trending up, otherwise repeat TFTs in 4-6 weeks as outpatient   - defer u/s to outpatient  - discuss with endo to facilitate outpatient f/u  - repeat TFTs in 4-6 weeks
TSH markedly elevated to 36, T3 and T4 also low;  been compliant with home levothyroxine  Appreciate inpatient endocrinology recs here  - Recommending LT4 125mcg qd PO with close outpatient endo follow up and consideration of US at this time per endo, otherwise can follow up w/ Henrique Kim at [address] [facility], [address], [place], [phone]
Vitamin B12 and folate levels normal, could be 2/2 hypothyroid
TSH markedly elevated to 36, T3 and T4 also low;  been compliant with home levothyroxine  - has not had TSH checked outpt since thyroid surgery in June 2022: suspect likely post-op hypothyroidism  - change to IV synthroid while inpatient and dc on synthroid 125mcg daily, per endo recs  - repeat free T4 in 1 week if still admitted at that time (1/20/23) to ensure FT4 levels are trending up, otherwise repeat TFTs in 4-6 weeks as outpatient   - defer u/s to outpatient  - discuss with endo to facilitate outpatient f/u  - repeat TFTs in 4-6 weeks
TSH markedly elevated to 36, T3 and T4 also low;  been compliant with home levothyroxine  Appreciate inpatient endocrinology recs here  - Recommending LT4 125mcg qd PO with close outpatient endo follow up and consideration of US at this time per endo, otherwise can follow up w/ Henrique Kim at [address] [facility], [address], [place], [phone]
Vitamin B12 and folate levels normal, could be 2/2 hypothyroid
TSH markedly elevated to 36, T3 and T4 also low;  been compliant with home levothyroxine  Appreciate inpatient endocrinology recs here  - Recommending LT4 125mcg qd PO with close outpatient endo follow up and consideration of US at this time per endo, otherwise can follow up w/ Henrique Kim at [address] [facility], [address], [place], [phone]

## 2023-02-01 NOTE — PROGRESS NOTE ADULT - PROBLEM SELECTOR PLAN 5
Encourage ambulation; IMPROVE score 0
Encourage ambulation; IMPROVE score 0
- Vitamin B12 and folate levels normal, could be 2/2 hypothyroid  - H/H stable   - continue to monitor
- Vitamin B12 and folate levels normal, could be 2/2 hypothyroid  - H/H stable   - continue to monitor
- Vitamin B12 and folate levels normal, could be 2/2 hypothyroid  - H/H stable   - Repeat as outpatient, further outpatient anemia w/u if persists
Encourage ambulation; IMPROVE score 0
Encourage ambulation; IMPROVE score 0
- Vitamin B12 and folate levels normal, could be 2/2 hypothyroid  - H/H stable   - Repeat as outpatient, further outpatient anemia w/u if persists
- Vitamin B12 and folate levels normal, could be 2/2 hypothyroid  - H/H stable   - continue to monitor
- Vitamin B12 and folate levels normal, could be 2/2 hypothyroid  - H/H stable   - continue to monitor
Encourage ambulation; IMPROVE score 0
- Vitamin B12 and folate levels normal, could be 2/2 hypothyroid  - H/H stable   - continue to monitor
- Vitamin B12 and folate levels normal, could be 2/2 hypothyroid  - H/H stable   - Repeat as outpatient, further outpatient anemia w/u if persists
- Vitamin B12 and folate levels normal, could be 2/2 hypothyroid  - H/H stable   - continue to monitor
Encourage ambulation; IMPROVE score 0  Dispo: pending OR for abscess drainage with ENT
- Vitamin B12 and folate levels normal, could be 2/2 hypothyroid  - H/H stable   - continue to monitor
- Vitamin B12 and folate levels normal, could be 2/2 hypothyroid  - H/H stable   - Repeat as outpatient, further outpatient anemia w/u if persists
- Vitamin B12 and folate levels normal, could be 2/2 hypothyroid  - H/H stable   - continue to monitor

## 2023-02-01 NOTE — PROGRESS NOTE ADULT - SUBJECTIVE AND OBJECTIVE BOX
[facility] Division of Hospital Medicine  Nikolay YoussefDO  Available via MS Teams  In house pager [phone]    SUBJECTIVE / OVERNIGHT EVENTS:  No acute events overnight.  No further nausea reported.  Has ongoing headache usually relieved with tylenol.  Says she feels comfortable going home today.    ADDITIONAL REVIEW OF SYSTEMS:    MEDICATIONS  (STANDING):  amoxicillin  875 milliGRAM(s)/clavulanate 1 Tablet(s) Oral every 12 hours  chlorhexidine 0.12% Liquid 15 milliLiter(s) Swish and Spit two times a day  enoxaparin Injectable 40 milliGRAM(s) SubCutaneous every 24 hours  famotidine    Tablet 20 milliGRAM(s) Oral daily  gabapentin 200 milliGRAM(s) Oral every 8 hours  levothyroxine 125 MICROGram(s) Oral daily  polyethylene glycol 3350 17 Gram(s) Oral daily  QUEtiapine 50 milliGRAM(s) Oral daily  senna 2 Tablet(s) Oral at bedtime  sertraline 50 milliGRAM(s) Oral daily  sodium chloride 0.9%. 1000 milliLiter(s) (100 mL/Hr) IV Continuous <Continuous>    MEDICATIONS  (PRN):  acetaminophen     Tablet .. 650 milliGRAM(s) Oral every 6 hours PRN Mild Pain (1 - 3)  oxyCODONE    IR 10 milliGRAM(s) Oral every 4 hours PRN Moderate to Severe pain      I&O's Summary      PHYSICAL EXAM:  Vital Signs Last 24 Hrs  T(C): 36.6 (01 Feb 2023 09:38), Max: 36.6 (31 Jan 2023 17:34)  T(F): 97.9 (01 Feb 2023 09:38), Max: 97.9 (01 Feb 2023 09:38)  HR: 77 (01 Feb 2023 09:38) (77 - 99)  BP: 110/72 (01 Feb 2023 09:38) (95/67 - 120/74)  BP(mean): --  RR: 18 (01 Feb 2023 09:38) (18 - 18)  SpO2: 100% (01 Feb 2023 09:38) (100% - 100%)    Parameters below as of 01 Feb 2023 09:38  Patient On (Oxygen Delivery Method): room air      CONSTITUTIONAL: NAD, well-developed, well-groomed  EYES: PERRLA; conjunctiva and sclera clear; left buccal swelling decreased and nontender to palpation  ENMT: Moist oral mucosa, no pharyngeal injection or exudates; normal dentition  NECK: Supple, no palpable masses; no thyromegaly  RESPIRATORY: Normal respiratory effort; lungs are clear to auscultation bilaterally  CARDIOVASCULAR: Regular rate and rhythm, normal S1 and S2, no murmur/rub/gallop; No lower extremity edema; Peripheral pulses are 2+ bilaterally  ABDOMEN: Nontender to palpation, normoactive bowel sounds, no rebound/guarding; No hepatosplenomegaly  MUSCULOSKELETAL:  Normal gait; no clubbing or cyanosis of digits; no joint swelling or tenderness to palpation  PSYCH: A+O to person, place, and time; affect appropriate  NEUROLOGY: CN 2-12 are intact and symmetric; no gross sensory deficits   SKIN: No rashes; no palpable lesions    LABS:                    COVID-19 PCR: NotDetec (17 Jan 2023 07:30)  COVID-19 PCR: NotDetec (03 Jan 2023 02:00)

## 2023-02-01 NOTE — PROGRESS NOTE ADULT - PROVIDER SPECIALTY LIST ADULT
ENT
Dental
ENT
ENT
Hospitalist
Hospitalist
Ophthalmology
ENT
Infectious Disease
Infectious Disease
ENT
Hospitalist
ENT
Internal Medicine
Hospitalist
Hospitalist
Internal Medicine
Hospitalist
Internal Medicine
Hospitalist
Internal Medicine
Hospitalist
Internal Medicine
Internal Medicine
Hospitalist

## 2023-02-06 ENCOUNTER — NON-APPOINTMENT (OUTPATIENT)
Age: 48
End: 2023-02-06

## 2023-02-14 ENCOUNTER — APPOINTMENT (OUTPATIENT)
Dept: OTOLARYNGOLOGY | Facility: CLINIC | Age: 48
End: 2023-02-14

## 2023-02-14 NOTE — HISTORY OF PRESENT ILLNESS
[de-identified] : Pt is here post Left facial abscess, left facial hematoma on 1/18/2023 from infection from  surgical extraction of teeth #2, 3, 4, 13 and 19 by Oral Surgery on 1/12/2023. pt is doing well since then and here to f/u

## 2023-02-14 NOTE — REASON FOR VISIT
[Initial Evaluation] : an initial evaluation for [FreeTextEntry2] : post Left facial abscess, left facial hematoma.\par \par Left facial abscess, left facial hematoma.\par \par Left facial abscess, left facial hematoma.\par \par Left facial abscess, left facial hematoma.\par \par vLeft facial abscess, left facial hematoma.\par \par Left facial abscess, left facial hematoma.\par \par Left facial abscess, left facial hematoma.\par \par Left facial abscess, left facial hematoma.\par \par post Left facial abscess, left facial hematoma

## 2023-02-16 ENCOUNTER — APPOINTMENT (OUTPATIENT)
Dept: INFECTIOUS DISEASE | Facility: CLINIC | Age: 48
End: 2023-02-16

## 2023-03-01 LAB
CULTURE RESULTS: SIGNIFICANT CHANGE UP
SPECIMEN SOURCE: SIGNIFICANT CHANGE UP

## 2023-03-06 ENCOUNTER — APPOINTMENT (OUTPATIENT)
Dept: OTOLARYNGOLOGY | Facility: CLINIC | Age: 48
End: 2023-03-06
Payer: MEDICAID

## 2023-03-06 VITALS
HEART RATE: 70 BPM | WEIGHT: 180 LBS | SYSTOLIC BLOOD PRESSURE: 94 MMHG | HEIGHT: 63 IN | TEMPERATURE: 97.3 F | BODY MASS INDEX: 31.89 KG/M2 | DIASTOLIC BLOOD PRESSURE: 65 MMHG

## 2023-03-06 DIAGNOSIS — L02.01 CUTANEOUS ABSCESS OF FACE: ICD-10-CM

## 2023-03-06 DIAGNOSIS — L02.11 CUTANEOUS ABSCESS OF NECK: ICD-10-CM

## 2023-03-06 PROCEDURE — 99213 OFFICE O/P EST LOW 20 MIN: CPT

## 2023-03-06 RX ORDER — GABAPENTIN 300 MG/1
300 CAPSULE ORAL
Qty: 90 | Refills: 0 | Status: ACTIVE | COMMUNITY
Start: 2022-08-01

## 2023-03-06 RX ORDER — AMOXICILLIN 500 MG/1
500 CAPSULE ORAL
Qty: 30 | Refills: 0 | Status: COMPLETED | COMMUNITY
Start: 2022-12-29

## 2023-03-06 RX ORDER — FAMOTIDINE 20 MG/1
20 TABLET, FILM COATED ORAL
Qty: 30 | Refills: 0 | Status: ACTIVE | COMMUNITY
Start: 2023-01-27

## 2023-03-06 RX ORDER — LEVOTHYROXINE SODIUM 0.12 MG/1
125 TABLET ORAL
Qty: 30 | Refills: 0 | Status: ACTIVE | COMMUNITY
Start: 2023-01-27

## 2023-03-06 RX ORDER — OXYCODONE 5 MG/1
5 TABLET ORAL
Qty: 12 | Refills: 0 | Status: COMPLETED | COMMUNITY
Start: 2022-06-01 | End: 2023-03-06

## 2023-03-06 RX ORDER — LEVOTHYROXINE SODIUM 0.1 MG/1
100 TABLET ORAL
Qty: 30 | Refills: 0 | Status: COMPLETED | COMMUNITY
Start: 2022-05-10 | End: 2023-03-06

## 2023-03-06 RX ORDER — LEVOTHYROXINE SODIUM 0.05 MG/1
50 TABLET ORAL
Qty: 30 | Refills: 0 | Status: COMPLETED | COMMUNITY
Start: 2022-01-21 | End: 2023-03-06

## 2023-03-06 RX ORDER — OXYCODONE HYDROCHLORIDE 15 MG/1
15 TABLET ORAL
Qty: 20 | Refills: 0 | Status: COMPLETED | COMMUNITY
Start: 2023-01-27

## 2023-03-06 RX ORDER — SENNOSIDES 8.6 MG TABLETS 8.6 MG/1
8.6 TABLET ORAL
Qty: 60 | Refills: 0 | Status: COMPLETED | COMMUNITY
Start: 2023-01-27

## 2023-03-06 NOTE — PHYSICAL EXAM
[FreeTextEntry1] : Small area of firmness along the left buccal mucosa upon bimanual palpation, the area of induration has significantly improved, no TTP, no drainage. [Normal] : no rashes

## 2023-03-06 NOTE — HISTORY OF PRESENT ILLNESS
[de-identified] : pt hx of thyroid cancer s/p thyroidectomy 1996 and neck dissection on 5/2022 and devolved abscess from tooth extraction 1/12/2023. pt developed multiples infection,  CT max facial shows worsening abscess Pt was in sent from [facility] to the [facility] ED on 1/18 and post exposed and found hematoma which evacuated and treated with antib. pt is here to f/u. Pt still c.o left side of cheek feels hard and moving. no swelling, no pain, no discharge. pt stated today will be the last day on antib.

## 2023-04-17 ENCOUNTER — APPOINTMENT (OUTPATIENT)
Dept: OTOLARYNGOLOGY | Facility: CLINIC | Age: 48
End: 2023-04-17

## 2023-07-11 NOTE — ED ADULT NURSE NOTE - NS ED NOTE  TALK SOMEONE YN
A1 c 8.8%, above goal of less than 8% higher than 8 months ago.  Restrict refined carbohydrates in diet, exercise 30 minutes a day 5 days a week, to lower blood sugars can try walking for 10 minutes after each meal for 30 minutes of exercise.  Lose weight.  Recheck A1 c 3 months and if still elevated will probably need to work with medications  Protein in urine indicating early diabetic kidney disease, treatment is to  get sugars under better control.  Kidney function remains normal.    Normal PSA, this is a screening for prostate cancer   There is blood in the stool, refer for colonoscopy
Testosterone levels are in the low-normal range
No

## 2023-07-24 ENCOUNTER — APPOINTMENT (OUTPATIENT)
Dept: NEUROLOGY | Facility: CLINIC | Age: 48
End: 2023-07-24
Payer: MEDICAID

## 2023-07-24 ENCOUNTER — NON-APPOINTMENT (OUTPATIENT)
Age: 48
End: 2023-07-24

## 2023-07-24 VITALS
DIASTOLIC BLOOD PRESSURE: 80 MMHG | WEIGHT: 180 LBS | SYSTOLIC BLOOD PRESSURE: 100 MMHG | HEIGHT: 63 IN | BODY MASS INDEX: 31.89 KG/M2

## 2023-07-24 DIAGNOSIS — M85.459: ICD-10-CM

## 2023-07-24 DIAGNOSIS — M48.061 SPINAL STENOSIS, LUMBAR REGION WITHOUT NEUROGENIC CLAUDICATION: ICD-10-CM

## 2023-07-24 PROCEDURE — 99204 OFFICE O/P NEW MOD 45 MIN: CPT

## 2023-07-24 RX ORDER — AMOXICILLIN AND CLAVULANATE POTASSIUM 875; 125 MG/1; MG/1
875-125 TABLET, COATED ORAL
Qty: 28 | Refills: 0 | Status: COMPLETED | COMMUNITY
Start: 2023-01-27 | End: 2023-07-24

## 2023-07-24 NOTE — CONSULT LETTER
[Dear  ___] : Dear  [unfilled], [Consult Letter:] : I had the pleasure of evaluating your patient, [unfilled]. [Please see my note below.] : Please see my note below. [Consult Closing:] : Thank you very much for allowing me to participate in the care of this patient.  If you have any questions, please do not hesitate to contact me. [Sincerely,] : Sincerely, [FreeTextEntry3] : Augie Murphy MD, PhD, DPN-N\par [facility]\par Neurology at [place]\par Chief, Division of Neurology\par [facility]\par

## 2023-07-24 NOTE — PHYSICAL EXAM
[FreeTextEntry1] : There is full range of movement of the cervical spine.\par There is no cervical palpation tenderness.\par Tinel sign negative at the wrists and elbows.\par Phalen sign negative at the wrists.\par Adson's maneuver negative bilaterally.\par \par There is no lumbar palpation tenderness.\par There is full range of movement of the lumbar spine.\par Straight leg raising is negative bilaterally.\par Kwan's maneuver negative bilaterally.\par  [General Appearance - Alert] : alert [General Appearance - In No Acute Distress] : in no acute distress [General Appearance - Well-Appearing] : healthy appearing [Oriented To Time, Place, And Person] : oriented to person, place, and time [Impaired Insight] : insight and judgment were intact [Affect] : the affect was normal [Memory Recent] : recent memory was not impaired [Person] : oriented to person [Place] : oriented to place [Time] : oriented to time [Concentration Intact] : normal concentrating ability [Fluency] : fluency intact [Comprehension] : comprehension intact [Cranial Nerves Optic (II)] : visual acuity intact bilaterally,  visual fields full to confrontation, pupils equal round and reactive to light [Cranial Nerves Oculomotor (III)] : extraocular motion intact [Cranial Nerves Trigeminal (V)] : facial sensation intact symmetrically [Cranial Nerves Facial (VII)] : face symmetrical [Cranial Nerves Vestibulocochlear (VIII)] : hearing was intact bilaterally [Cranial Nerves Glossopharyngeal (IX)] : tongue and palate midline [Cranial Nerves Accessory (XI - Cranial And Spinal)] : head turning and shoulder shrug symmetric [Cranial Nerves Hypoglossal (XII)] : there was no tongue deviation with protrusion [Motor Tone] : muscle tone was normal in all four extremities [Motor Strength] : muscle strength was normal in all four extremities [No Muscle Atrophy] : normal bulk in all four extremities [Paresis Pronator Drift Right-Sided] : no pronator drift on the right [Paresis Pronator Drift Left-Sided] : no pronator drift on the left [Sensation Tactile Decrease] : light touch was intact [Sensation Pain / Temperature Decrease] : pain and temperature was intact [Romberg's Sign] : Romberg's sign was negtive [Abnormal Walk] : normal gait [Balance] : balance was intact [Past-pointing] : there was no past-pointing [Tremor] : no tremor present [Coordination - Dysmetria Impaired Finger-to-Nose Bilateral] : not present [Coordination - Dysmetria Impaired Heel-to-Shin Bilateral] : not present [2+] : Ankle jerk left 2+ [Plantar Reflex Right Only] : normal on the right [Plantar Reflex Left Only] : normal on the left [PERRL With Normal Accommodation] : pupils were equal in size, round, reactive to light, with normal accommodation [Extraocular Movements] : extraocular movements were intact [Full Visual Field] : full visual field

## 2023-07-24 NOTE — ASSESSMENT
[FreeTextEntry1] : Spinal stenosis\par Carpal tunnel syndrome\par Cystic lesion anterior to the sacrum\par \par Her neurological examination is normal\par Arm numbness likely related to her carpal tunnel syndrome\par She has epidural injections pending for the lumbar region\par I am recommending she begin some physical therapy as well\par For further evaluation of the cystic lesion anterior to the sacrum we will get a pelvic MRI as recommended by read the radiologist\par \par Follow-up here in 4 months and by phone prior to that as needed

## 2023-07-24 NOTE — HISTORY OF PRESENT ILLNESS
[FreeTextEntry1] : This 48-year-old woman was seen in neurological consultation today\par She has had numbness in the legs going on for about 2 years with pain in the low back pain that started about a year ago.  Pain goes down the legs.  She also has independent numbness of her arms.  Arm numbness is worse in the morning upon awakening and predominantly affects the hands.\par She has seen a neurologist.  Said she had EMG and nerve conduction studies of the legs which were normal.\par \par Had a lumbar MRI 3/14/22 which shows mild spinal stenosis at L4-5, multilevel degenerative changes with impingement of multiple nerve roots, and a 3.5 cm cystic lesion present adjacent to the upper sacrum for with which further imaging was recommended but apparently never done.\par \par She has known carpal tunnel syndrome and had surgery on the right many years ago.\par \par She has seen a pain management doctor and has epidural injections pending.  She has never gone for any sort of physical therapy.  She uses gabapentin which helps a little\par \par Medical history otherwise significant for thyroid cancer status post thyroidectomy, anxiety and depression\par \par She works as a  in a dental office.

## 2023-09-21 NOTE — ED ADULT NURSE NOTE - MODE OF DISCHARGE
Referral has been approve and sleep study scheduling have reached out to pt. See second e-advice from today for encounter with sleep study .   
Ambulatory

## 2023-11-18 NOTE — CONSULT NOTE ADULT - PROBLEM SELECTOR RECOMMENDATION 9
RICE- rest, ice, compression, elevation. Walking boot and crutches/nonweightbearing for comfort. Call tomorrow for official xray results. Call Ortho today and follow-up with them in the next 1-2 days for further evaluation and treatment.    Call [facility] to schedule an appointment: [phone]  Or the direct Ortho number at [phone] to schedule an appointment   Go to the ER immediately if any numbness, tingling, weakness, change in intensity or location of pain, calf pain or swelling, other new or concerning symptoms - Recommend CT scan face   - Recommend blood cultures   - Recommend Dental to see patient - necrotic tooth noted on left side   - Recommend adequate pain control   - Recommend Unasyn  while inpatient   - ENT to follow up the results of CT scan   - Will discuss with attending

## 2023-11-29 ENCOUNTER — APPOINTMENT (OUTPATIENT)
Dept: NEUROLOGY | Facility: CLINIC | Age: 48
End: 2023-11-29

## 2024-01-09 NOTE — ASU PATIENT PROFILE, ADULT - MEDICATIONS BROUGHT TO HOSPITAL, PROFILE
Date Of Fraction 3: 01/04/2024
Dimensions-Y Axis In Cm: 0
Date Of Fraction 7: 01/10/2024
Total Planned Fractions: 30
Send Cpt Codes To Pm?: Yes
Date Of Fraction 4: 01/05/2024
Early, Moist Desquamation Counseling: The patient was instructed in meticulous wound care and counseled on potential and expected side effects of treatment and reasonable expectations for the time to heal. They appeared to have all of their questions answered to their satisfaction. They were recommended to cleanse the treatment site with dilute hydrogen peroxide and water (using 50:50 ratio). They were told how to apply the solution gently with a cotton ball twice daily. After cleaning, they were instructed to pat the area dry with a soft cloth and then apply a small amount of Silvadene 1% cream twice daily. They were told to return to the clinic in 7 days for re-evaluation. The patient understands to call with any questions, concerns or difficulties. They were informed of the potentital for infection and counseled on common signs and symptoms of infection including skin redness extending outside of the treatment area, enlargement or skin breakdown, significant warmth, pain, fever or chills or sweats. They voiced an understanding to contact us immediately if any of these symptoms develop. Their follow up appointment was scheduled. They were also instructed to follow-up with dermatology for skin cancer surveillance.
Date Of Fraction 8: 01/11/2024
Date Of Fraction 9: 01/12/2024
Daily Dosage Administered: 200
Total Rx Dosage: 6000
Assessment: Appropriate reaction
Date Of Fraction 1: 01/02/2024
Date Of Fraction 5: 01/08/2024
Additional Change To Daily Dosage Administered Mid Treatment?: No
Radiation Units: cGy
Date Of Fraction 6: 01/09/2024
Location Override (Location Will Render As Ema Body Location If Left Blank): Left Inferior Vermillian Lip
Date Of Fraction 10: 01/15/2024
Ebt Cpt Code (Please Add Code Details Below): 
Date Of Fraction 2: 01/03/2024
Detail Level: Simple
Mild, Moderate, Brisk Erythema Or Dry Desquamation Counseling: The patient was instructed in meticulous wound care and counseled on potential and expected side effects of treatment and reasonable expectations for the time to heal. They appeared to have all of their questions answered to their satisfaction. They were recommended to rinse the treatment site with mild soap and water (recommended Dove, Neutrogena or Cetaphil). After rinsing the treatment site twice a day they are to apply a pea-sized amount of Aquaphor healing ointment twice daily. Aquaphor samples were provided. The patient understands to call with any questions, concerns or difficulties. They were informed of the potentital for infection and counseled on common signs and symptoms of infection including skin redness extending outside of the treatment area, enlargement or skin breakdown, significant warmth, pain, fever or chills or sweats. They voiced an understanding to contact us immediately if any of these symptoms develop. Their follow up appointment was scheduled. They were also instructed to follow-up with dermatology for skin cancer surveillance.
18
no

## 2024-04-02 NOTE — H&P ADULT - NSCORESITESY/N_GEN_A_CORE_RD
Please Approve or Refuse.  Send to Pharmacy per Pt's Request: [facility]      Next Visit Date:  5/2/2024   Last Visit Date: 11/2/2023    Hemoglobin A1C (%)   Date Value   05/05/2023 5.5   01/10/2022 5.4   08/06/2021 5.3             ( goal A1C is < 7)   BP Readings from Last 3 Encounters:   02/01/24 119/78   11/02/23 124/74   07/06/23 119/80          (goal 120/80)  BUN   Date Value Ref Range Status   05/05/2023 17 8 - 23 mg/dL Final     Creatinine   Date Value Ref Range Status   05/05/2023 0.89 0.70 - 1.20 mg/dL Final     Potassium   Date Value Ref Range Status   05/05/2023 4.2 3.7 - 5.3 mmol/L Final              No

## 2024-04-24 ENCOUNTER — APPOINTMENT (OUTPATIENT)
Dept: INTERNAL MEDICINE | Facility: CLINIC | Age: 49
End: 2024-04-24

## 2024-04-25 PROBLEM — N83.209 UNSPECIFIED OVARIAN CYST, UNSPECIFIED SIDE: Chronic | Status: ACTIVE | Noted: 2018-09-24

## 2024-04-25 RX ORDER — QUETIAPINE FUMARATE 200 MG/1
1 TABLET, FILM COATED ORAL
Qty: 0 | Refills: 0 | DISCHARGE

## 2024-04-25 RX ORDER — LEVOTHYROXINE SODIUM 125 MCG
1 TABLET ORAL
Qty: 0 | Refills: 0 | DISCHARGE

## 2024-04-25 RX ORDER — SERTRALINE 25 MG/1
1 TABLET, FILM COATED ORAL
Qty: 0 | Refills: 0 | DISCHARGE

## 2024-04-25 RX ORDER — GABAPENTIN 400 MG/1
2 CAPSULE ORAL
Qty: 0 | Refills: 0 | DISCHARGE

## 2024-07-31 NOTE — PATIENT PROFILE ADULT - CENTRAL VENOUS CATHETER/PICC LINE
07/31/24 0900   Daily Treatment   Symptoms Review Activity Group 0900 - 1000   Affect Anxious   Mood Anxious   Thought Process Organized   Psychosis None   Symptom Notation Pt reported he is feeling better today than yesterday, which he attributed to getting better sleep.   Psychosocial Stressors Interpersonal conflict;Academic/Career concerns   Sleep Report Fair   Hours Slept 5.5-6 hours; broken   Meals Lunch;Dinner;Breakfast   Goal Complete / Activity Pt reported he did not complete his goal of doing chores due to going to dinner and movie with his wife.   Treatment Plan Continue treatment plan   Patient Response Appropriate feedback;Attentive;Good eye contact   Comment Pt completed sx rating sheet.   RN Monitoring of Pain, Safety, and Relapse   Safety Concerns Suicidal ideation;Homicidal thoughts;Urge to harm self   Types of Safety Concerns SI: 1/10; HI: 1/10; UHS: 1/10   Physical Concerns None reported   Pain Concerns None reported   Use of Street Drugs or Alcohol No   Taking Medications as Prescribed Yes     Total in group: 6    Barbara Treviño, LD    no

## 2024-08-26 NOTE — PATIENT PROFILE ADULT - NSPROSPHOSPCHAPLAINYN_GEN_A_NUR
[facility]  [address] [place] [address]  Phone: [phone]  Fax: [phone]      Date of Visit: 2024   Patient Name: Mook Dey   Patient :  1999     ASSESSMENT/PLAN   1. Acute right-sided thoracic back pain  2. Trapezius muscle strain, right, initial encounter  Acute, patient injured right shoulder/trapezius after dead lifting movement approximately 2 weeks ago.  Patient was seen in urgent care provided Mobic, prednisone, Flexeril with minimal relief.  Thoracic x-ray and right shoulder x-ray were negative for any acute process.  Will provide patient with OMT referral and physical therapy to be performed 2-3 times per week for 4 weeks.  Will also provide basic exercise to be performed prior to his first physical therapy appointment.  3. Left wrist pain  Chronic, patient injured wrist earlier this year while doing overhead movement.  Will also provide OMT and physical therapy as above.    Patient Instructions   Thank you for following up with us at [facility] office. It was a pleasure to meet you today!     Our plan is the following:  Provided basic exercises for right shoulder and left wrist to perform prior to your physical therapy appointment/referral.  Continue performing exercises that do not aggravate the pain in your right shoulder/back and left wrist.  Continue using nonsteroid anti-inflammatories for pain as needed, ice and heat as needed, rest when able.  Continue getting 7-8 hours of sleep per night for adequate recovery.    Your medication list is included in the after visit summary. If you find any differences when compared to your medications at home, please contact the office ([phone]) to let us know.   You can also call the office if you have any additional questions or concerns and speak to one of the staff. They will triage and forward the message to the provider.  no

## 2024-10-11 NOTE — ED ADULT TRIAGE NOTE - PRO INTERPRETER NEED 2
Patient had to cancel her SDS appt for today @ 11:30 due to lack of transportation.  Declined video visit, wanted to get swabbed for the flu.  Is available after 1:00-1:30,  if anything opens up please call her.   English

## 2024-12-03 NOTE — DISCHARGE NOTE PROVIDER - NSRESEARCHGRANT_PROPHYLAXISRECOMFT_GEN_A_CORE
.Best Practices Inpatient Care Hospitalist Progress Note      Date Of Service: 12/3/2024    Subjective:  Patient is seen and examined, sitting in chair.    No new complaints.      II/O's    Intake/Output Summary (Last 24 hours) at 12/3/2024 1118  Last data filed at 12/3/2024 1000  Gross per 24 hour   Intake 250 ml   Output 3025 ml   Net -2775 ml          Hospital Meds  Current Facility-Administered Medications   Medication Dose Route Frequency Provider Last Rate Last Admin    insulin glargine (LANTUS) injection 66 Units  66 Units Subcutaneous Nightly Anita Dennison MD   66 Units at 12/02/24 2042    cefTRIAXone (ROCEPHIN) 2,000 mg in sterile water (preservative free) IV syringe  2,000 mg Intravenous Daily Hamzah Barron DO   2,000 mg at 12/03/24 0832    metoPROLOL tartrate (LOPRESSOR) tablet 12.5 mg  12.5 mg Oral 2 times per day Holli Harvey MD   12.5 mg at 12/03/24 0831    insulin lispro (ADMELOG, HumaLOG) injection 22 Units  22 Units Subcutaneous TID WC Anita Dennison MD   22 Units at 12/03/24 0836    insulin lispro (ADMELOG, HumaLOG) injection 6 Units  6 Units Subcutaneous Daily with lunch Anita Dennison MD   6 Units at 12/02/24 1131    insulin lispro (ADMELOG, HumaLOG) sliding scale injection   Subcutaneous 4x Daily AC & HS Anita Dennison MD   8 Units at 12/02/24 2219    dextrose 50 % injection 25 g  25 g Intravenous PRN O'Ivelisse Chand CNP        dextrose 50 % injection 12.5 g  12.5 g Intravenous PRN O'Ivelisse Chand CNP        glucagon (GLUCAGEN) injection 1 mg  1 mg Intramuscular PRN LYDIA'Ivelisse Chand CNP        dextrose (GLUTOSE) 40 % gel 15 g  15 g Oral PRN O'Ivelisse Chand CNP        dextrose (GLUTOSE) 40 % gel 30 g  30 g Oral PRN O'Ivelisse Chand CNP        atorvastatin (LIPITOR) tablet 40 mg  40 mg Oral Nightly Yuliana Butler PA-C   40 mg at 12/02/24 2042    aspirin chewable 81 mg  81 mg Oral Daily Carrie, Yuliana W, PA-C   81 mg at 12/03/24 0831    enoxaparin (LOVENOX) injection 40 mg   40 mg Subcutaneous Q24H Marcell Hernandez MD   40 mg at 12/02/24 2042    aluminum-magnesium hydroxide-simethicone (MAALOX) 200-200-20 MG/5ML suspension 15 mL  15 mL Oral Q6H PRN Yuliana Butler PA-C        sodium chloride 0.9 % injection 10 mL  10 mL Intravenous PRN Yuliana Butler PA-C        sodium chloride 0.9 % injection 2 mL  2 mL Intracatheter 2 times per day Yuliana Butler PA-C   2 mL at 12/03/24 0835    bumetanide (BUMEX) injection 2 mg  2 mg Intravenous Q12H Yuliana Butler PA-C   2 mg at 12/03/24 0639    ondansetron (ZOFRAN ODT) disintegrating tablet 4 mg  4 mg Oral Q12H PRN O'Ivelisse Chand CNP        Or    ondansetron (ZOFRAN) injection 4 mg  4 mg Intravenous Q12H PRN O'Ivelisse Chand CNP        metoPROLOL (LOPRESSOR) injection 5 mg  5 mg Intravenous PRN O'Ivelisse Chand CNP        potassium CHLORIDE 60 mEq in sodium chloride 0.9 % 150 mL total volume IVPB  60 mEq Intravenous PRN O'Ivelisse Chand CNP        potassium CHLORIDE (KLOR-CON) packet 20 mEq  20 mEq Oral PRN O'Ivelisse Chand CNP        And    potassium CHLORIDE 40 mEq/100 mL IVPB premix  40 mEq Intravenous PRN O'Ivelisse Chand CNP        potassium CHLORIDE (KLOR-CON) packet 40 mEq  40 mEq Oral PRN O'Ivelisse Chand CNP        Or    potassium CHLORIDE 40 mEq/100 mL IVPB premix  40 mEq Intravenous PRN O'JagrutiIvelisse paulino CNP        potassium CHLORIDE (KLOR-CON) packet 20 mEq  20 mEq Oral PRN O'JagrutiIvelisse paulino CNP        Or    potassium CHLORIDE 20 MEQ/50ML IVPB premix 20 mEq  20 mEq Intravenous PRN O'Ivelisse Chand CNP        bisacodyl (DULCOLAX) suppository 10 mg  10 mg Rectal Daily PRN O'Ivelisse Chand CNP        magnesium hydroxide (MILK OF MAGNESIA) 400 MG/5ML suspension 30 mL  30 mL Oral Daily PRN O'Ivelisse Chand, CNP   30 mL at 11/29/24 1841    docusate sodium-sennosides (SENOKOT S) 50-8.6 MG 2 tablet  2 tablet Oral Daily Ivelisse Kyle, CNP   2 tablet at 12/03/24 0832    bisacodyl (DULCOLAX) suppository 10 mg  10 mg Rectal Once  Ivelisse Kyle CNP        polyethylene glycol (MIRALAX) packet 17 g  17 g Oral BID O'Ivelisse Chand CNP   17 g at 12/03/24 0831    acetaminophen (TYLENOL) tablet 650 mg  650 mg Oral Q4H PRN LYDIA'Ivelisse Chand CNP   650 mg at 12/02/24 0920    morphine injection 2 mg  2 mg Intravenous Q2H PRN LYDIA'Ivelisse Chand CNP   2 mg at 12/03/24 0929    oxyCODONE (IMM REL) (ROXICODONE) tablet 10 mg  10 mg Oral Q4H PRN LYDIA'Ivelisse Chand CNP   10 mg at 12/02/24 2223    metoCLOPramide (REGLAN) tablet 10 mg  10 mg Oral Q6H PRN LYDIA'Ivelisse Chand CNP        Or    metoCLOPramide (REGLAN) injection 10 mg  10 mg Intravenous Q6H PRN LYDIA'Ivelisse Chand CNP        pregabalin (LYRICA) capsule 50 mg  50 mg Oral 2 times per day LYDIA'Ivelisse Chand CNP   50 mg at 12/03/24 0831    melatonin tablet 6 mg  6 mg Oral QHS PRN Ivelisse Kyle CNP        pantoprazole (PROTONIX) EC tablet 40 mg  40 mg Oral Nightly LYDIA'Ivelisse Chand CNP   40 mg at 12/02/24 2042    potassium CHLORIDE ER tablet 10 mEq  10 mEq Oral BID WC LYDIA'Ivelisse Chand CNP   10 mEq at 12/03/24 0832        Last Recorded Vitals  Temp:  [97.5 °F (36.4 °C)-98.6 °F (37 °C)] 98.6 °F (37 °C)  Heart Rate:  [] 108  Resp:  [15-18] 16  BP: (101-121)/(67-81) 118/75       SpO2 Readings from Last 3 Encounters:   12/03/24 98%        Physical Exam    General: Alert in no acute distress   Head and Neck: Normocephalic, atraumatic. Conjunctivae clear,  hearing is normal.   Neck supple.   Respiratory: decreased BS bilaterally, no wheezing. On RA  Cardiovascular: tachy, sternal incision covered with dressing  Gastrointestinal: Abdomen is soft nontender, nondistended,   Neuro: Cranial nerves grossly intact,  no focal deficit. Speech is normal. Moves all extremities.   Skin: No rash, no jaundice.  Extremities: LLE in dressing, wound vac  in place  Psych: Appropriate mood and affect.     Labs     Recent Labs     12/02/24  1617 12/02/24  2210 12/03/24  0419 12/03/24  0654   WBC  --   --  11.0  --    HCT  --   --   32.5*  --    HGB  --   --  10.1*  --    PLT  --   --  348  --    SODIUM  --   --  135  --    POTASSIUM  --   --  3.9  --    CHLORIDE  --   --  98  --    CO2  --   --  32  --    GLUCOSE  --   --  135*  --    BUN  --   --  20  --    CREATININE  --   --  0.76  --    CALCIUM  --   --  8.8  --    GLUB 235* 338*  --  143*        Imaging    XR CHEST AP OR PA   Final Result      Left lower lobe infiltrate and left pleural effusion.               Electronically Signed by: JERRI ZELAYA M.D.    Signed on: 12/1/2024 10:50 AM    Workstation ID: [id number]      XR CHEST AP OR PA   Final Result      Edison-Trang catheter and chest tube removal. No pneumothorax. Stable left   lower lobe airspace consolidation and small left pleural effusion.               Electronically Signed by: MYKEL BARRETO M.D.    Signed on: 11/25/2024 7:06 PM    Workstation ID: [id number]      XR CHEST AP OR PA   Final Result      1.   Postoperative changes. Small bilateral pleural effusions and bibasilar   opacities, likely atelectasis               Electronically Signed by: HELEN GRUBER M.D.    Signed on: 11/25/2024 8:11 AM    Workstation ID: [id number]      XR CHEST AP OR PA   Final Result   FINDINGS/IMPRESSION: Cardiac size within normal limits. Poststernotomy   changes. Post valvular replacement. Right IJ approach Edison-Trang the left   main pulmonary artery. Mediastinal drain. Nasogastric tube courses through   the stomach. Endotracheal tube at the thuy. No lobar consolidation,   pleural effusion, or pneumothorax. Possible curvilinear foreign body   overlying/within left upper quadrant abdomen, correlate clinically.       Called to [facility] OR7 11.24.24, 1516 hours.               Electronically Signed by: NAYELI LOWE M.D.    Signed on: 11/24/2024 3:17 PM    Workstation ID: [id number]      CT ABDOMEN PELVIS W CONTRAST   Final Result   1.   No acute abnormality of the abdomen or pelvis. Normal appendix.   Moderate stool burden.   2.   Mild  improvement of small right and minimal left pleural effusions   with bibasilar atelectasis and consolidations.   3.   Other nonacute findings, as above.         Electronically Signed by: AMOL SWEENEY M.D.    Signed on: 11/24/2024 7:54 AM    Workstation ID: [id number]      FL INTRAOPERATIVE C ARM NO REPORT   Final Result      CTA ABDOMINAL AORTA ILIOFEMORAL BILATERAL RUNOFF   Final Result   Addendum (preliminary) 1 of 1   Addendum:      Critical findings discussed with Dr. Green of vascular surgery at   approximately 4:10 p.m. on the date of examination      Electronically Signed by: JSEUS MALDONADO M.D.    Signed on: 11/21/2024 4:23 PM    Workstation ID: [id number]      Final      1. Occlusion of the left popliteal P2 segment with reconstitution of the   anterior tibial artery several centimeters distally. Flow past the left   ankle however is not demonstrated      2. No definite evidence for major arterial occlusive disease in the right   lower extremity      3. Small right pleural effusion, hepatic steatosis         Electronically Signed by: JESUS MALDONADO M.D.    Signed on: 11/21/2024 4:10 PM    Workstation ID: [id number]      NUC MED MYOCARDIAL PERFUSION SPECT MULTI   Final Result   Normal myocardial perfusion study.       This study was interpreted with Dr Mynor Simon DO   Cardiology Fellow       Dictated by: DENAE HOLT DO   Dictated on: 11/21/2024 1:28 PM          SJ ARRIAGA M.D., have reviewed the images and report and   concur with these findings interpreted by DENAE HOLT DO.      Electronically Signed by: SJ FISHER M.D.    Signed on: 11/21/2024 1:34 PM    Workstation ID: [id number]       VASC UPPER EXTREMITY VENOUS DUPLEX RIGHT   Final Result   1.  Normal exam.  Specifically, there is no evidence of DVT.         Electronically Signed by: GISELLE BENSON M.D.    Signed on: 11/21/2024 3:00 PM    Workstation ID:  [id number]      US VASC LOWER EXTREMITY VENOUS DUPLEX BILATERAL   Final Result   1.  Normal lower extremity venous duplex study.  Specifically, there is no   evidence of acute DVT.         Electronically Signed by: GISELLE BENSON M.D.    Signed on: 11/21/2024 3:00 PM    Workstation ID: [id number]      MRI BRAIN W WO CONTRAST   Final Result   1. Nonhemorrhagic right cerebellar and bilateral cerebral infarcts, as   detailed above.      2. Focused examination of the orbits is unremarkable, as detailed above.      Electronically Signed by: GISELLE BENSON M.D.    Signed on: 11/21/2024 8:22 AM    Workstation ID: [id number]      MRI ORBIT W WO CONTRAST   Final Result   1. Nonhemorrhagic right cerebellar and bilateral cerebral infarcts, as   detailed above.      2. Focused examination of the orbits is unremarkable, as detailed above.      Electronically Signed by: GISELLE BENSON M.D.    Signed on: 11/21/2024 8:22 AM    Workstation ID: [id number]      CTA HEAD AND NECK   Final Result   1. Normal CTA neck.      2. Normal CTA head.      Electronically Signed by: JERRI ZELAYA M.D.    Signed on: 11/20/2024 3:18 PM    Workstation ID: [id number]      US LIVER GALLBLADDER PANCREAS (RUQ)   Final Result   1. Diffuse fatty liver infiltration with hepatomegaly.   2. No cholelithiasis or sonographic evidence of cholecystitis.               Electronically Signed by: LOS DEMPSEY M.D.    Signed on: 11/20/2024 9:33 AM    Workstation ID: [id number]      CT HEAD WO CONTRAST   Final Result      2 small nonspecific areas of hypoattenuation within the right cerebellum   (2:6-8). Consider further evaluation with MRI if there is continued   clinical concern.         Electronically Signed by: ODILON MONGE MD    Signed on: 11/20/2024 4:56 AM    Workstation ID: [id number]      CTA CHEST PULMONARY EMBOLISM   Final Result   1.   No evidence of pulmonary emboli.   2.   Small sliding-type hiatal hernia.   3.   Hepatic  steatosis.               Electronically Signed by: SARITHA WILKES M.D.    Signed on: 11/19/2024 9:45 PM    Workstation ID: [id number]      XR CHEST PA AND LATERAL 2 VIEWS   Final Result      Normal chest               Electronically Signed by: JERRI ZELAYA M.D.    Signed on: 11/19/2024 7:37 PM    Workstation ID: [id number]          Cultures  Microbiology Results       None           CASSIE:  1.  Left ventricle: The cavity size is normal. Systolic function is      hyperdynamic. The ejection fraction was measured by visual estimation.      Wall motion is normal; there are no regional wall motion abnormalities.      The ejection fraction is 75%.  2.  Aortic valve: The annulus is normal. The valve is trileaflet. The leaflets      are normal thickness. Cusp separation is normal. Mobility is not      restricted. There is no evidence of a vegetation. There is mild      regurgitation originating from the central coaptation point and directed      centrally in the LVOT.  3.  Mitral valve: 3D imaging and post-processing assessment performed. The      annulus is normal size. There is no significant annular calcification. The      posterior leaflet has normal thickness. The anterior mitral leaftlet is      thickened with large irregular shaped homogeneous highly mobile      echogenicity on the atrial side of the leaflet consistent with vegetation.      The main body of the vegetation is at least 1.6 cm long. There are      multiple very thin echogenicities extending from the main body of the      vegetation. The vegetation appears to involve the mid to distal portion of      the central A2 scallop of the anterior leaflet. There is no evidence of      annular abscess or abcess involving the aorto-mitral curtain. There is a      small gap in coaptation at the central A2 P2 commissure. There is severe      regurgitation, originating from the central commissure, directed      eccentrically, posteriorly, and along the left  atrial wall. Diastolic      leaflet excursion is normal; there is no evidence of stenosis.  4.  Left atrium: The atrium is dilated. There is no evidence of a thrombus in      the atrial cavity or appendage. No spontaneous echo contrast is observed.      Appendage: There is no evidence of a thrombus.  5.  Left upper pulmonary vein: Doppler signals show systolic flow reversal,      suggestive of severe mitral regurgitation.  6.  Right upper pulmonary vein: Doppler signals show systolic flow reversal,      suggestive of severe mitral regurgitation.  7.  Right lower pulmonary vein: Doppler signals show systolic flow reversal,      suggestive of severe mitral regurgitation.  8.  Right ventricle: The cavity size is normal. Systolic function is normal.  9.  Right atrium: The estimated central venous pressure is 3mm Hg.  10. Pulmonic valve: There is no evidence of a vegetation.  11. Pericardium, extracardiac: There is no pericardial effusion. There is a      left pleural effusion.  12. Baseline ECG: Sinus tachycardia.  IMPRESSIONS:  There is a large mobile echodensity on the atrial aspect of the  anterior mitral valve leaflet consisntent with a vegetation. There is no  evidence of annular abscess or abscess involving the aorto-mitral curtain.       Assessment & Plan  LLE acute limb threatening ischemia brendon 2B now s/p LLE thrombectomy and 4 compartment fasciotomy 11/21.   - Vascular following  - heparin GTT  - continue SICU care  - monitor CPK  - wound care following for daily fasciotomy dressing changes  -Will need anticoagulation when okay with cardiovascular surgery     Transient left blurry vision   Cerebellar infarcts due to septic emboli  Strep bacteremia subacute endocarditis  Neuro following  ID following  -Ceftriaxone transition to Zosyn per ID  CASSIE large mitral valve vegetation and severe mitral regurgitation  Pending CT AP with contrast  CVT consulted   S/p mitral valve replacement, resection of left atrial  appendage on 11/24/2024  On aspirin     # Shortness of breath -ruled out PE  # History of nicotine dependency  #Elevated troponin rule out ACS  #Tachycardia  #LUE swelling - US negative  CTA CHEST PULMONARY EMBOLISM -  No evidence of pulmonary emboli.  CXR - Normal chest              proBNP 206             Influenza A, B, RSV, COVID-19 not detected             Started on albuterol MDI as needed             DuoNeb as needed for shortness of breath              Chest x-ray noted- as the above             Troponin-118, down to 103  EKG- Sinus tachycardia Nonspecific T wave abnormality Abnormal ECG   No previous ECGs available -130, QTc 447             Cardiology following             Monitor on telemetry             EKG as above             Heart rate remains 110s to to 120's             Toxicology negative             S/p IV fluid bolus and Tylenol in ER             Monitor labs  Ddimer elevated, CT PE negative  US LE negative for DVT     Heart block - wenckebach  Noted on telemetry, per EP appears to be wenckebach phenomenon with normal rates  EP consulted  Recommending to continue to monitor while treating infection     Streptococcus mitis/oralis bacteremia secondary to large mitral valve vegetations with associated severe MR  Sirs, concern for sepsis  Status post mitral valve replacement and resection of left atrial appendage 11/24             WBC 11.6             Pro-[place] 1.38             Lactic acid 2.5             ESR 77             UA noted specific gravity >1.030, glucose> 1000, trace protein, large blood, and few bacteria.             Pending blood cultures             S/p IV fluid bolus in ER             Continue IV hydration as above             On IV Zosyn, ID may switch to ceftriaxone pending Karius  CTAP with IV and oral contrast reviewed  Patient taken to the OR 11/24 for mitral valve replacement due to embolic phenomenon of the right hand     Leukocytosis             Monitor labs     Lactic acidosis              Lactic acid 2.5 > 1             S/p IV fluid bolus in the ER              Trend lactic acid     Hyponatremia            Corrected for hyperglycemia     Transaminitis             AST 57, ALT 93, alk phos 338             Trend LFTs     #Hypoalbuminemia  # Reported ongoing poor appetite             Albumin 2.5             US abdomen              Encourage high-protein diet     Anemia -likely chronic              Hemoglobin 12.5 (11/29)              Hemoglobin 15.2 (10/31)      Hyperglycemia in a patient with history of type II DM  monitor on sliding scale             Hold oral antidiabetics             Endocrinology on consult, adjusting insulin regimen     Primary Hypertension             Take lisinopril at home -holding for possible permissive hypertension             Monitor BP     Hypertriglyceridemia             lipid panel             Takes statin at home -continue      GERD -takes Protonix at home, continue     Recurrent pancreatitis             Normal lipase 58, no symptoms             Monitor    Patient is medically stable for discharge  Discussed with CV surgery    Nutrition: reg  DVT Ppx: Lovenox  Dispo:   SOLITARIO: 1 day    Code Status    Code Status: Full Resuscitation    Avril Sarmiento MD  Best Practices Inpatient Care    This note was created using the Dragon voice recognition system. Errors in content may be related to improper recognition of the system. Effort to review and correct the note has been made but irregularities may still be present.     Note to the patient:  the 21st Century Cures Act makes medical notes like these available to patients in the interest of transparency. Please be advised that this is a medical document. Medical documents are intended to carry relevant information, facts as evident, and the clinical opinion of the practitioner. The medical note is intended as peer to peer communication, and may appear blunt or direct. It is written in medical language, and may  contain abbreviations or verbiage that are unfamiliar.        IMPROVE-DD Application Not Available

## 2025-02-10 ENCOUNTER — APPOINTMENT (OUTPATIENT)
Age: 50
End: 2025-02-10

## (undated) DEVICE — SUT CHROMIC 3-0 27" SH

## (undated) DEVICE — SUT VICRYL 2-0 27" SH UNDYED

## (undated) DEVICE — SUT ETHILON 4-0 18" PS-2

## (undated) DEVICE — PREP BETADINE SPONGE STICKS

## (undated) DEVICE — DRAPE TOWEL BLUE 17" X 24"

## (undated) DEVICE — BUR MICRO TAPR 1.6X3.8MM

## (undated) DEVICE — SUT VICRYL 4-0 27" RB-1 UNDYED

## (undated) DEVICE — GLV 8 PROTEXIS (WHITE)

## (undated) DEVICE — SUT VICRYL 2-0 18" TIES UNDYED

## (undated) DEVICE — LONE STAR RETRACTOR RING 12MM BLUNT DISP

## (undated) DEVICE — SUT VICRYL 3-0 18" TIES UNDYED

## (undated) DEVICE — FOLEY TRAY 16FR 5CC LF UMETER CLOSED

## (undated) DEVICE — PACK HEAD & NECK

## (undated) DEVICE — FOLEY CATH 2-WAY 14FR 5CC SILICONE

## (undated) DEVICE — WARMING BLANKET FULL ADULT

## (undated) DEVICE — CANISTER DISPOSABLE THIN WALL 3000CC

## (undated) DEVICE — SPONGE PEANUT AUTO COUNT

## (undated) DEVICE — PACK DENTAL MINOR

## (undated) DEVICE — SUT MONOCRYL 3-0 27" PS-2 UNDYED

## (undated) DEVICE — ELCTR GROUNDING PAD ADULT COVIDIEN

## (undated) DEVICE — VENODYNE/SCD SLEEVE CALF MEDIUM

## (undated) DEVICE — ELCTR BOVIE PENCIL SMOKE EVACUATION

## (undated) DEVICE — GLV 7.5 PROTEXIS (CREAM) MICRO

## (undated) DEVICE — Device

## (undated) DEVICE — LABELS BLANK W PEN

## (undated) DEVICE — BASIN SET DOUBLE

## (undated) DEVICE — SUT CHROMIC 3-0 27" RB-1

## (undated) DEVICE — POSITIONER FOAM EGG CRATE ULNAR 2PCS (PINK)

## (undated) DEVICE — DRSG BENZOIN 0.6CC

## (undated) DEVICE — DRAPE 3/4 SHEET 52X76"

## (undated) DEVICE — DRSG DERMABOND 0.7ML

## (undated) DEVICE — SOL IRR POUR H2O 1500ML

## (undated) DEVICE — SUT CHROMIC 4-0 27" RB-1

## (undated) DEVICE — SOL IRR POUR NS 0.9% 500ML

## (undated) DEVICE — LIGASURE ATLAS 10MM 20CM

## (undated) DEVICE — SUT MONOCRYL 4-0 27" PS-2 UNDYED

## (undated) DEVICE — ELCTR BOVIE TIP BLADE INSULATED 2.75" EDGE

## (undated) DEVICE — DRAPE FLUID WARMER 44 X 44"

## (undated) DEVICE — LIJ-ZIMMER MESHGRAFTER: Type: DURABLE MEDICAL EQUIPMENT

## (undated) DEVICE — MARKING PEN W RULER

## (undated) DEVICE — NDL HYPO SAFE 25G X 5/8" (ORANGE)

## (undated) DEVICE — NDL COUNTER FOAM AND MAGNET 20-40

## (undated) DEVICE — DRAPE MAGNETIC INSTRUMENT MEDIUM

## (undated) DEVICE — SUT SILK 2-0 18" FS

## (undated) DEVICE — BIPOLAR FORCEP KIRWAN JEWELERS STR 4" X 0.4MM W 12FT CORD (GREEN)

## (undated) DEVICE — DRAPE LAPAROTOMY TRANSVERSE

## (undated) DEVICE — PROTECTOR HEEL / ELBOW FLUFFY

## (undated) DEVICE — LIJ/LIA-ESU FORCEFX F7H55667A: Type: DURABLE MEDICAL EQUIPMENT

## (undated) DEVICE — PACK MAJOR ABDOMINAL WITH LAP

## (undated) DEVICE — DRAIN RESERVOIR FOR JACKSON PRATT 100CC CARDINAL

## (undated) DEVICE — GOWN LG

## (undated) DEVICE — POSITIONER STRAP ARMBOARD VELCRO TS-30

## (undated) DEVICE — HANDPIECE IRRIGATION W/ BATTERY PACKAND HIGH FLOW TIP

## (undated) DEVICE — DRSG XEROFORM 5 X 9"

## (undated) DEVICE — STAPLER SKIN VISI-STAT 35 WIDE